# Patient Record
Sex: FEMALE | Race: WHITE | NOT HISPANIC OR LATINO | Employment: OTHER | URBAN - METROPOLITAN AREA
[De-identification: names, ages, dates, MRNs, and addresses within clinical notes are randomized per-mention and may not be internally consistent; named-entity substitution may affect disease eponyms.]

---

## 2017-03-22 DIAGNOSIS — E89.0 POSTABLATIVE HYPOTHYROIDISM: ICD-10-CM

## 2017-03-22 RX ORDER — LEVOTHYROXINE SODIUM 75 UG/1
TABLET ORAL
Qty: 30 TABLET | Refills: 1 | Status: SHIPPED | OUTPATIENT
Start: 2017-03-22 | End: 2017-05-26 | Stop reason: SDUPTHER

## 2017-05-26 ENCOUNTER — PATIENT MESSAGE (OUTPATIENT)
Dept: FAMILY MEDICINE | Facility: CLINIC | Age: 57
End: 2017-05-26

## 2017-05-26 DIAGNOSIS — Z13.220 SCREENING CHOLESTEROL LEVEL: ICD-10-CM

## 2017-05-26 DIAGNOSIS — E61.1 IRON DEFICIENCY: Primary | ICD-10-CM

## 2017-05-26 DIAGNOSIS — Z13.1 DIABETES MELLITUS SCREENING: ICD-10-CM

## 2017-05-26 DIAGNOSIS — E89.0 POSTABLATIVE HYPOTHYROIDISM: ICD-10-CM

## 2017-05-26 RX ORDER — LEVOTHYROXINE SODIUM 75 UG/1
TABLET ORAL
Qty: 30 TABLET | Refills: 0 | Status: SHIPPED | OUTPATIENT
Start: 2017-05-26 | End: 2017-06-22 | Stop reason: SDUPTHER

## 2017-05-31 LAB
ALBUMIN SERPL-MCNC: 4.1 G/DL (ref 3.6–5.1)
ALBUMIN/GLOB SERPL: 1.5 (CALC) (ref 1–2.5)
ALP SERPL-CCNC: 98 U/L (ref 33–130)
ALT SERPL-CCNC: 11 U/L (ref 6–29)
AST SERPL-CCNC: 16 U/L (ref 10–35)
BASOPHILS # BLD AUTO: 30 CELLS/UL (ref 0–200)
BASOPHILS NFR BLD AUTO: 0.6 %
BILIRUB SERPL-MCNC: 0.4 MG/DL (ref 0.2–1.2)
BUN SERPL-MCNC: 12 MG/DL (ref 7–25)
BUN/CREAT SERPL: NORMAL (CALC) (ref 6–22)
CALCIUM SERPL-MCNC: 9.3 MG/DL (ref 8.6–10.4)
CHLORIDE SERPL-SCNC: 104 MMOL/L (ref 98–110)
CHOLEST SERPL-MCNC: 203 MG/DL (ref 125–200)
CHOLEST/HDLC SERPL: 3 (CALC)
CO2 SERPL-SCNC: 26 MMOL/L (ref 20–31)
CREAT SERPL-MCNC: 1.04 MG/DL (ref 0.5–1.05)
EOSINOPHIL # BLD AUTO: 125 CELLS/UL (ref 15–500)
EOSINOPHIL NFR BLD AUTO: 2.5 %
ERYTHROCYTE [DISTWIDTH] IN BLOOD BY AUTOMATED COUNT: 13.7 % (ref 11–15)
FERRITIN SERPL-MCNC: 9 NG/ML (ref 10–232)
GFR SERPL CREATININE-BSD FRML MDRD: 60 ML/MIN/1.73M2
GLOBULIN SER CALC-MCNC: 2.8 G/DL (CALC) (ref 1.9–3.7)
GLUCOSE SERPL-MCNC: 88 MG/DL (ref 65–99)
HCT VFR BLD AUTO: 38.6 % (ref 35–45)
HDLC SERPL-MCNC: 68 MG/DL
HGB BLD-MCNC: 13.4 G/DL (ref 11.7–15.5)
LDLC SERPL CALC-MCNC: 110 MG/DL (CALC)
LYMPHOCYTES # BLD AUTO: 1610 CELLS/UL (ref 850–3900)
LYMPHOCYTES NFR BLD AUTO: 32.2 %
MCH RBC QN AUTO: 29.9 PG (ref 27–33)
MCHC RBC AUTO-ENTMCNC: 34.8 G/DL (ref 32–36)
MCV RBC AUTO: 85.9 FL (ref 80–100)
MONOCYTES # BLD AUTO: 790 CELLS/UL (ref 200–950)
MONOCYTES NFR BLD AUTO: 15.8 %
NEUTROPHILS # BLD AUTO: 2445 CELLS/UL (ref 1500–7800)
NEUTROPHILS NFR BLD AUTO: 48.9 %
NONHDLC SERPL-MCNC: 135 MG/DL (CALC)
PLATELET # BLD AUTO: 223 THOUSAND/UL (ref 140–400)
PMV BLD REES-ECKER: 7.5 FL (ref 7.5–12.5)
POTASSIUM SERPL-SCNC: 4.8 MMOL/L (ref 3.5–5.3)
PROT SERPL-MCNC: 6.9 G/DL (ref 6.1–8.1)
RBC # BLD AUTO: 4.49 MILLION/UL (ref 3.8–5.1)
SODIUM SERPL-SCNC: 138 MMOL/L (ref 135–146)
T4 FREE SERPL-MCNC: 1.3 NG/DL (ref 0.8–1.8)
TRIGL SERPL-MCNC: 123 MG/DL
TSH SERPL-ACNC: 3.08 MIU/L
WBC # BLD AUTO: 5 THOUSAND/UL (ref 3.8–10.8)

## 2017-06-02 ENCOUNTER — OFFICE VISIT (OUTPATIENT)
Dept: FAMILY MEDICINE | Facility: CLINIC | Age: 57
End: 2017-06-02
Payer: COMMERCIAL

## 2017-06-02 VITALS
TEMPERATURE: 98 F | DIASTOLIC BLOOD PRESSURE: 80 MMHG | BODY MASS INDEX: 22.08 KG/M2 | SYSTOLIC BLOOD PRESSURE: 108 MMHG | OXYGEN SATURATION: 98 % | HEART RATE: 77 BPM | WEIGHT: 120 LBS | HEIGHT: 62 IN

## 2017-06-02 DIAGNOSIS — Z23 NEED FOR TDAP VACCINATION: ICD-10-CM

## 2017-06-02 DIAGNOSIS — E89.0 POSTABLATIVE HYPOTHYROIDISM: ICD-10-CM

## 2017-06-02 DIAGNOSIS — E61.1 IRON DEFICIENCY: ICD-10-CM

## 2017-06-02 DIAGNOSIS — Z00.00 ANNUAL PHYSICAL EXAM: Primary | ICD-10-CM

## 2017-06-02 PROCEDURE — 90715 TDAP VACCINE 7 YRS/> IM: CPT | Mod: S$GLB,,, | Performed by: NURSE PRACTITIONER

## 2017-06-02 PROCEDURE — 99999 PR PBB SHADOW E&M-EST. PATIENT-LVL IV: CPT | Mod: PBBFAC,,, | Performed by: NURSE PRACTITIONER

## 2017-06-02 PROCEDURE — 90471 IMMUNIZATION ADMIN: CPT | Mod: S$GLB,,, | Performed by: NURSE PRACTITIONER

## 2017-06-02 PROCEDURE — 99396 PREV VISIT EST AGE 40-64: CPT | Mod: S$GLB,,, | Performed by: NURSE PRACTITIONER

## 2017-06-02 NOTE — PROGRESS NOTES
Subjective:       Patient ID: Sarahy Ma is a 57 y.o. female.    Chief Complaint: Annual Exam (wellness)    Patient is a 57 year old white female here today for annual physical exam with fasting wellness results.    Patient has a history of Tongue Cancer that is followed by Oncologist at [facility].    Patient has Postablative Hypothyroidism due to the radiation treatment for tongue cancer.  Thyroid levels are controlled on Synthroid at present dose.  See results below.    Patient has Iron Deficiency.  She states due to stomach issues, she can not tolerate the ferrous sulfate.  She reports she spoke with her oncologist about the iron deficiency and he referred her to Dr. Tobias.  Dr. Tobias plans to do an upper and lower GI scope - patient needs to schedule.  Ferritin count remains low at 9.    Wellness labs:  -  CBC WNL - shows no anemia but ferritin level is 9.  -  CMP WNL  -  Cholesterol - total slightly elevated at 203 but HDL high at 68 so ratio okay and .  Triglycerides are normal.  -  Thyroid levels controlled on Synthroid at present dose.    Health Maintenance:  -  Reports had PAP this year with Dr. Washington - will request record.  -  Due for Tdap vaccine.          Component      Latest Ref Rng & Units 5/30/2017 11/14/2016 8/23/2016   WBC      3.8 - 10.8 Thousand/uL 5.0 5.67    RBC      3.80 - 5.10 Million/uL 4.49 4.37    Hemoglobin      11.7 - 15.5 g/dL 13.4 12.3    Hematocrit      35.0 - 45.0 % 38.6 37.6    MCV      80.0 - 100.0 fL 85.9 86    MCH      27.0 - 33.0 pg 29.9 28.1    MCHC      32.0 - 36.0 g/dL 34.8 32.7    RDW      11.0 - 15.0 % 13.7 13.2    Platelets      140 - 400 Thousand/uL 223 192    MPV      7.5 - 12.5 fL 7.5 8.9 (L)    Gran #      1.8 - 7.7 K/uL  3.3    Lymph #      850 - 3,900 cells/uL 1,610 1.3    Mono #      200 - 950 cells/uL 790 0.8    Eos #      15 - 500 cells/uL 125 0.3    Baso #      0 - 200 cells/uL 30 0.03    Gran%      38.0 - 73.0 %  57.7    Lymph%      % 32.2 22.6     Mono%      % 15.8 14.1    Eosinophil%      % 2.5 4.9    Basophil%      % 0.6 0.5    Differential Method        Automated    Neutrophils Absolute      1,500 - 7,800 cells/uL 2,445     Neutrophils Relative      % 48.9     Glucose      65 - 99 mg/dL 88 84    BUN, Bld      7 - 25 mg/dL 12 12    Creatinine      0.50 - 1.05 mg/dL 1.04 0.88    eGFR if non       > OR = 60 mL/min/1.73m2 60 >60.0    eGFR if       > OR = 60 mL/min/1.73m2 70 >60.0    BUN/Creatinine Ratio      6 - 22 (calc) NOT APPLICABLE     Sodium      135 - 146 mmol/L 138 144    Potassium      3.5 - 5.3 mmol/L 4.8 4.1    Chloride      98 - 110 mmol/L 104 103    CO2      20 - 31 mmol/L 26 28    Calcium      8.6 - 10.4 mg/dL 9.3 9.3    Total Protein      6.1 - 8.1 g/dL 6.9 7.1    Albumin      3.6 - 5.1 g/dL 4.1 3.9    Globulin, Total      1.9 - 3.7 g/dL (calc) 2.8     Albumin/Globulin Ratio      1.0 - 2.5 (calc) 1.5     Total Bilirubin      0.2 - 1.2 mg/dL 0.4 0.4    Alkaline Phosphatase      33 - 130 U/L 98 90    AST      10 - 35 U/L 16 23    ALT      6 - 29 U/L 11 23    Anion Gap      8 - 16 mmol/L  13    Cholesterol      125 - 200 mg/dL 203 (H)     HDL      > OR = 46 mg/dL 68     Triglycerides      <150 mg/dL 123     LDL Cholesterol      <130 mg/dL (calc) 110     HDL/Chol Ratio      < OR = 5.0 (calc) 3.0     Non HDL Chol. (LDL+VLDL)      mg/dL (calc) 135     Iron      30 - 160 ug/dL  39    Transferrin      200 - 375 mg/dL  305    TIBC      250 - 450 ug/dL  451 (H)    Saturated Iron      20 - 50 %  9 (L)    TSH      mIU/L 3.08 2.593 3.253   Free T4      0.71 - 1.51 ng/dL  1.22 1.16   Ferritin      10 - 232 ng/mL 9 (L) 8 (L)    T4, Free      0.8 - 1.8 ng/dL 1.3         Previous Medications    SYNTHROID 75 MCG TABLET    TAKE 1 TABLET BY MOUTH ONCE DAILY.       Past Medical History:   Diagnosis Date    Postablative hypothyroidism     secondary to the radiation treatment for tongue cancer - treated by oncologist    Tongue  cancer        Past Surgical History:   Procedure Laterality Date     SECTION      CHOLECYSTECTOMY  2016    removed    CHOLECYSTECTOMY  2016    feeding tube      PORTACATH PLACEMENT      TONSILLECTOMY      TUBAL LIGATION         Family History   Problem Relation Age of Onset    Hypertension Father     Diabetes Father     Hypertension Mother     Thyroid disease Sister     Thyroid disease Brother     Thyroid disease Sister     Thyroid disease Brother        Social History     Social History    Marital status:      Spouse name: N/A    Number of children: N/A    Years of education: N/A     Social History Main Topics    Smoking status: Former Smoker    Smokeless tobacco: None    Alcohol use Yes      Comment: occasionally    Drug use: No    Sexual activity: Not Asked     Other Topics Concern    None     Social History Narrative    None       Review of Systems   Constitutional: Positive for fatigue. Negative for activity change, appetite change, chills, fever and unexpected weight change.   HENT: Negative for congestion, ear pain, hearing loss, mouth sores, nosebleeds, postnasal drip, rhinorrhea, sinus pressure, sneezing, sore throat, trouble swallowing and voice change.    Eyes: Negative for photophobia, pain, discharge, redness, itching and visual disturbance.   Respiratory: Negative for cough, chest tightness, shortness of breath and wheezing.    Cardiovascular: Negative for chest pain, palpitations and leg swelling.   Gastrointestinal: Positive for constipation. Negative for abdominal pain, blood in stool, diarrhea, nausea and vomiting.   Endocrine: Negative for polydipsia and polyuria.   Genitourinary: Negative for difficulty urinating, dysuria, frequency, hematuria, menstrual problem and urgency.   Musculoskeletal: Positive for arthralgias. Negative for back pain, joint swelling, myalgias and neck pain.   Skin: Negative for color change and rash.   Allergic/Immunologic:  "Negative for immunocompromised state.   Neurological: Positive for headaches. Negative for dizziness, seizures, syncope and weakness.   Hematological: Negative for adenopathy. Does not bruise/bleed easily.   Psychiatric/Behavioral: Negative for agitation, confusion, dysphoric mood, sleep disturbance and suicidal ideas. The patient is not nervous/anxious.          Objective:     Vitals:    06/02/17 0831   BP: 108/80   BP Location: Left arm   Patient Position: Sitting   BP Method: Manual   Pulse: 77   Temp: 97.8 °F (36.6 °C)   TempSrc: Oral   SpO2: 98%   Weight: 54.4 kg (120 lb)   Height: 5' 1.5" (1.562 m)          Physical Exam   Constitutional: She is oriented to person, place, and time. She appears well-developed and well-nourished. No distress.   Body mass index is 22.31 kg/m².     HENT:   Head: Normocephalic and atraumatic.   Right Ear: External ear normal.   Left Ear: External ear normal.   Nose: Nose normal.   Mouth/Throat: Oropharynx is clear and moist. No oropharyngeal exudate.   Eyes: EOM are normal. Pupils are equal, round, and reactive to light.   Neck: Normal range of motion. Neck supple. No JVD present. No tracheal deviation present. No thyromegaly present.   Cardiovascular: Normal rate, regular rhythm and normal heart sounds.    No murmur heard.  Pulmonary/Chest: Effort normal and breath sounds normal. No stridor. No respiratory distress.   Abdominal: Soft. She exhibits no distension. There is no tenderness. There is no guarding.   Musculoskeletal: Normal range of motion. She exhibits no edema.   Lymphadenopathy:     She has no cervical adenopathy.   Neurological: She is alert and oriented to person, place, and time. No cranial nerve deficit. Coordination normal.   Skin: Skin is warm and dry. No rash noted. She is not diaphoretic.   Psychiatric: She has a normal mood and affect.         Assessment:         ICD-10-CM ICD-9-CM   1. Annual physical exam Z00.00 V70.0   2. Postablative hypothyroidism E89.0 " 244.1   3. Iron deficiency E61.1 280.9   4. Need for Tdap vaccination Z23 V06.1       Plan:       Annual physical exam  Health Maintenance Summary     Influenza Vaccine Next Due 8/1/2017    Mammogram Next Due 2/14/2019     Done 2/14/2017 [facility]    Done 3/14/2016     Done 7/15/2014    Pap Smear with HPV Cotest Next Due 1/6/2020     Done 1/6/2017 Dr. Washington   Lipid Panel Next Due 5/30/2022     Done 5/30/2017 LIPID PANEL (A)    Done 7/24/2015 LIPID PANEL   Colonoscopy Next Due 7/14/2024     Done 7/14/2014 normal - repeat in 10 years - Dr. Greene   TETANUS VACCINE Next Due 6/2/2027     Done 6/2/2017 Imm Admin: Tdap   Hepatitis C Screening Completed     Done 8/23/2016 HEPATITIS C ANTIBODY         Postablative hypothyroidism  -  Controlled on Synthroid at present dose.    Iron deficiency  -  Patient is followed by GI, Dr. Tobias for further evaluation.    Need for Tdap vaccination  -     Tdap Vaccine      Return in about 6 months (around 12/2/2017) for thyroid check.     Patient's Medications   New Prescriptions    No medications on file   Previous Medications    SYNTHROID 75 MCG TABLET    TAKE 1 TABLET BY MOUTH ONCE DAILY.   Modified Medications    No medications on file   Discontinued Medications    MECLIZINE (ANTIVERT) 25 MG TABLET    Take 1 tablet (25 mg total) by mouth every 6 (six) hours as needed for Dizziness.    MOMETASONE (NASONEX) 50 MCG/ACTUATION NASAL SPRAY    2 sprays once daily.

## 2017-06-22 ENCOUNTER — PATIENT MESSAGE (OUTPATIENT)
Dept: FAMILY MEDICINE | Facility: CLINIC | Age: 57
End: 2017-06-22

## 2017-06-22 DIAGNOSIS — E89.0 POSTABLATIVE HYPOTHYROIDISM: ICD-10-CM

## 2017-06-22 RX ORDER — LEVOTHYROXINE SODIUM 75 UG/1
TABLET ORAL
Qty: 30 TABLET | Refills: 5 | Status: SHIPPED | OUTPATIENT
Start: 2017-06-22 | End: 2017-07-10 | Stop reason: DRUGHIGH

## 2017-07-06 ENCOUNTER — PATIENT MESSAGE (OUTPATIENT)
Dept: FAMILY MEDICINE | Facility: CLINIC | Age: 57
End: 2017-07-06

## 2017-07-06 DIAGNOSIS — E89.0 POSTABLATIVE HYPOTHYROIDISM: ICD-10-CM

## 2017-07-10 RX ORDER — LEVOTHYROXINE SODIUM 88 UG/1
88 TABLET ORAL DAILY
Qty: 30 TABLET | Refills: 1 | Status: SHIPPED | OUTPATIENT
Start: 2017-07-10 | End: 2017-08-16 | Stop reason: DRUGHIGH

## 2017-08-16 ENCOUNTER — OFFICE VISIT (OUTPATIENT)
Dept: FAMILY MEDICINE | Facility: CLINIC | Age: 57
End: 2017-08-16
Payer: COMMERCIAL

## 2017-08-16 VITALS
BODY MASS INDEX: 21.79 KG/M2 | HEART RATE: 81 BPM | OXYGEN SATURATION: 98 % | HEIGHT: 62 IN | DIASTOLIC BLOOD PRESSURE: 96 MMHG | TEMPERATURE: 98 F | SYSTOLIC BLOOD PRESSURE: 144 MMHG | WEIGHT: 118.38 LBS

## 2017-08-16 DIAGNOSIS — E89.0 POSTABLATIVE HYPOTHYROIDISM: Primary | ICD-10-CM

## 2017-08-16 DIAGNOSIS — R03.0 TEMPORARY HIGH BLOOD PRESSURE: ICD-10-CM

## 2017-08-16 PROCEDURE — 99999 PR PBB SHADOW E&M-EST. PATIENT-LVL IV: CPT | Mod: PBBFAC,,, | Performed by: NURSE PRACTITIONER

## 2017-08-16 PROCEDURE — 99213 OFFICE O/P EST LOW 20 MIN: CPT | Mod: S$GLB,,, | Performed by: NURSE PRACTITIONER

## 2017-08-16 PROCEDURE — 3008F BODY MASS INDEX DOCD: CPT | Mod: S$GLB,,, | Performed by: NURSE PRACTITIONER

## 2017-08-16 RX ORDER — MESALAMINE 1.2 G/1
2.4 TABLET, DELAYED RELEASE ORAL EVERY MORNING
Refills: 4 | COMMUNITY
Start: 2017-08-04 | End: 2019-12-09

## 2017-08-16 RX ORDER — BALSALAZIDE DISODIUM 750 MG/1
CAPSULE ORAL
Refills: 5 | COMMUNITY
Start: 2017-08-09 | End: 2018-08-08

## 2017-08-16 RX ORDER — METRONIDAZOLE 500 MG/1
TABLET ORAL
Refills: 0 | COMMUNITY
Start: 2017-08-04 | End: 2017-11-24

## 2017-08-16 RX ORDER — LEVOTHYROXINE SODIUM 75 UG/1
75 TABLET ORAL DAILY
Qty: 30 TABLET | Refills: 1
Start: 2017-08-16 | End: 2018-02-15 | Stop reason: SDUPTHER

## 2017-08-16 RX ORDER — MESALAMINE 1000 MG/1
SUPPOSITORY RECTAL
Refills: 0 | COMMUNITY
Start: 2017-08-09 | End: 2017-11-24

## 2017-08-16 NOTE — PROGRESS NOTES
Subjective:       Patient ID: Sarahy Ma is a 57 y.o. female.    Chief Complaint: Blood Pressure Check (started Saturday Blood pressure going up and down)    Patient is here today for blood pressure check.    Patient reports that for the past 4 days, she has been feeling bad and her blood pressure has been elevated at home.  Reports blood pressure mostly in the 130 - 140s/90s.  Patient is normally in the 110-120/70s previously.  Patient has been started on several GI medications for constipation in the past month but this is not a listed side effect.  Patient did convince me to increase her Synthroid from 75 mcg to 88 mcg in 2017 - so now on the increased dose of Synthroid and now having blood pressure fluctuations.  According to chart - last time blood pressure was high in  and having palpitations, it was also related to thyroid level.  Advised patient that we will decrease dose to 75 mcg daily - skip 2 days and start at lower dose.  Monitor blood pressure at home and follow up in 2 weeks.        Previous Medications    BALSALAZIDE (COLAZAL) 750 MG CAPSULE    TAKE 3 CAPSULE(S) 3 TIMES A DAY BY ORAL ROUTE FOR 30 DAYS.    CANASA 1,000 MG SUPP    UNWRAP & INSERT 1 SUPPOSITORY EVERY DAY BY RECTAL ROUTE AT BEDTIME.    LIALDA 1.2 GRAM TBEC    Take 2.4 g by mouth every morning.    METRONIDAZOLE (FLAGYL) 500 MG TABLET    Take 1 tablet twice daily    SYNTHROID 88 MCG TABLET    Take 1 tablet (88 mcg total) by mouth once daily. BRAND ONLY       Past Medical History:   Diagnosis Date    Postablative hypothyroidism     secondary to the radiation treatment for tongue cancer - treated by oncologist    Tongue cancer        Past Surgical History:   Procedure Laterality Date     SECTION      CHOLECYSTECTOMY  2016    removed    CHOLECYSTECTOMY  2016    feeding tube      PORTACATH PLACEMENT      TONSILLECTOMY      TUBAL LIGATION         Family History   Problem Relation Age of Onset    Hypertension  Father     Diabetes Father     Hypertension Mother     Thyroid disease Sister     Thyroid disease Brother     Thyroid disease Sister     Thyroid disease Brother        Social History     Social History    Marital status:      Spouse name: N/A    Number of children: N/A    Years of education: N/A     Social History Main Topics    Smoking status: Former Smoker    Smokeless tobacco: Never Used    Alcohol use Yes      Comment: occasionally    Drug use: No    Sexual activity: Not Asked     Other Topics Concern    None     Social History Narrative    None       Review of Systems   Constitutional: Positive for fatigue. Negative for appetite change, chills, fever and unexpected weight change.   HENT: Negative for congestion, ear pain, mouth sores, nosebleeds, postnasal drip, rhinorrhea, sinus pressure, sneezing, sore throat, trouble swallowing and voice change.    Eyes: Negative for photophobia, pain, discharge, redness, itching and visual disturbance.   Respiratory: Negative for cough, chest tightness and shortness of breath.    Cardiovascular: Negative for chest pain, palpitations and leg swelling.   Gastrointestinal: Negative for abdominal pain, blood in stool, constipation, diarrhea, nausea and vomiting.   Genitourinary: Negative for dysuria, frequency, hematuria and urgency.   Musculoskeletal: Negative for arthralgias, back pain, joint swelling and myalgias.   Skin: Negative for color change and rash.   Allergic/Immunologic: Negative for immunocompromised state.   Neurological: Positive for dizziness, weakness and light-headedness. Negative for seizures, syncope and headaches.   Hematological: Negative for adenopathy. Does not bruise/bleed easily.   Psychiatric/Behavioral: Negative for agitation, dysphoric mood, sleep disturbance and suicidal ideas. The patient is not nervous/anxious.          Objective:     Vitals:    08/16/17 1402 08/16/17 1419 08/16/17 1420   BP: (!) 170/98 (!) 142/98 (!)  "144/96   BP Location: Left arm Left arm Right arm   Patient Position: Sitting     BP Method: Medium (Manual)     Pulse: 81     Temp: 97.9 °F (36.6 °C)     TempSrc: Oral     SpO2: 98%     Weight: 53.7 kg (118 lb 6.2 oz)     Height: 5' 1.5" (1.562 m)            Physical Exam   Constitutional: She is oriented to person, place, and time. She appears well-developed and well-nourished.   HENT:   Head: Normocephalic and atraumatic.   Right Ear: External ear normal.   Left Ear: External ear normal.   Nose: Nose normal.   Mouth/Throat: Oropharynx is clear and moist. No oropharyngeal exudate.   Eyes: EOM are normal. Pupils are equal, round, and reactive to light.   Neck: Normal range of motion. Neck supple. No tracheal deviation present. No thyromegaly present.   Cardiovascular: Normal rate, regular rhythm and normal heart sounds.    No murmur heard.  Pulmonary/Chest: Effort normal and breath sounds normal. No respiratory distress.   Abdominal: Soft. She exhibits no distension.   Musculoskeletal: Normal range of motion. She exhibits no edema.   Lymphadenopathy:     She has no cervical adenopathy.   Neurological: She is alert and oriented to person, place, and time. No cranial nerve deficit. Coordination normal.   Skin: Skin is warm and dry. No rash noted.   Psychiatric: She has a normal mood and affect.         Assessment:         ICD-10-CM ICD-9-CM   1. Postablative hypothyroidism E89.0 244.1   2. Temporary high blood pressure R03.0 796.2       Plan:       Postablative hypothyroidism  -  Skip 2 days thyroid medication and start back on lower dose Synthroid 75 mcg daily - will repeat levels in 8 to 12 weeks.  -     SYNTHROID 75 mcg tablet; Take 1 tablet (75 mcg total) by mouth once daily.  Dispense: 30 tablet; Refill: 1    Temporary high blood pressure  -  Monitor blood pressure at home and keep log - will follow up in 2 weeks.      Return in about 2 weeks (around 8/30/2017).     Patient's Medications   New Prescriptions    " SYNTHROID 75 MCG TABLET    Take 1 tablet (75 mcg total) by mouth once daily.   Previous Medications    BALSALAZIDE (COLAZAL) 750 MG CAPSULE    TAKE 3 CAPSULE(S) 3 TIMES A DAY BY ORAL ROUTE FOR 30 DAYS.    CANASA 1,000 MG SUPP    UNWRAP & INSERT 1 SUPPOSITORY EVERY DAY BY RECTAL ROUTE AT BEDTIME.    LIALDA 1.2 GRAM TBEC    Take 2.4 g by mouth every morning.    METRONIDAZOLE (FLAGYL) 500 MG TABLET    Take 1 tablet twice daily   Modified Medications    No medications on file   Discontinued Medications    SYNTHROID 88 MCG TABLET    Take 1 tablet (88 mcg total) by mouth once daily. BRAND ONLY

## 2017-08-18 ENCOUNTER — TELEPHONE (OUTPATIENT)
Dept: FAMILY MEDICINE | Facility: CLINIC | Age: 57
End: 2017-08-18

## 2017-08-18 RX ORDER — METOPROLOL TARTRATE 25 MG/1
25 TABLET, FILM COATED ORAL 2 TIMES DAILY
Qty: 60 TABLET | Refills: 0 | Status: SHIPPED | OUTPATIENT
Start: 2017-08-18 | End: 2017-09-22 | Stop reason: SDUPTHER

## 2017-08-18 NOTE — TELEPHONE ENCOUNTER
Patient reports blood pressure continues to stay elevated 140s/90s and still feels some dizziness/fogginess.  Will treat the blood pressure with Metoprolol 25 mg twice daily starting today on Friday and see how she feels over the next 3 to 4 days - if no improvement in symptoms by Wednesday - come into office.  If symptoms improving and resolving on medications - continue medication and we will monitor and follow up in 2 weeks.  Patient verbalizes understanding.

## 2017-08-18 NOTE — TELEPHONE ENCOUNTER
----- Message from Sara Pinedo sent at 8/18/2017 11:12 AM CDT -----  No. [phone]    Patient would like to speak to Susan Hebert about how she is progressing.

## 2017-09-01 DIAGNOSIS — E89.0 POSTABLATIVE HYPOTHYROIDISM: ICD-10-CM

## 2017-09-01 RX ORDER — LEVOTHYROXINE SODIUM 88 UG/1
TABLET ORAL
Qty: 30 TABLET | Refills: 1 | OUTPATIENT
Start: 2017-09-01

## 2017-09-22 RX ORDER — METOPROLOL TARTRATE 25 MG/1
25 TABLET, FILM COATED ORAL 2 TIMES DAILY
Qty: 60 TABLET | Refills: 0 | Status: SHIPPED | OUTPATIENT
Start: 2017-09-22 | End: 2018-08-08

## 2017-09-22 NOTE — TELEPHONE ENCOUNTER
Advise patient we need to come in for office visit to check blood pressure on medication - make appt for around first week October.

## 2017-09-25 ENCOUNTER — PATIENT MESSAGE (OUTPATIENT)
Dept: FAMILY MEDICINE | Facility: CLINIC | Age: 57
End: 2017-09-25

## 2017-11-24 ENCOUNTER — OFFICE VISIT (OUTPATIENT)
Dept: FAMILY MEDICINE | Facility: CLINIC | Age: 57
End: 2017-11-24
Payer: COMMERCIAL

## 2017-11-24 ENCOUNTER — TELEPHONE (OUTPATIENT)
Dept: FAMILY MEDICINE | Facility: CLINIC | Age: 57
End: 2017-11-24

## 2017-11-24 VITALS
BODY MASS INDEX: 22.07 KG/M2 | HEIGHT: 62 IN | DIASTOLIC BLOOD PRESSURE: 68 MMHG | WEIGHT: 119.94 LBS | HEART RATE: 71 BPM | SYSTOLIC BLOOD PRESSURE: 102 MMHG | TEMPERATURE: 98 F | OXYGEN SATURATION: 98 %

## 2017-11-24 DIAGNOSIS — D50.9 IRON DEFICIENCY ANEMIA, UNSPECIFIED IRON DEFICIENCY ANEMIA TYPE: ICD-10-CM

## 2017-11-24 DIAGNOSIS — R03.0 TEMPORARY HIGH BLOOD PRESSURE: Primary | ICD-10-CM

## 2017-11-24 PROBLEM — Z85.810 HISTORY OF TONGUE CANCER: Status: ACTIVE | Noted: 2017-11-24

## 2017-11-24 PROCEDURE — 99999 PR PBB SHADOW E&M-EST. PATIENT-LVL IV: CPT | Mod: PBBFAC,,, | Performed by: NURSE PRACTITIONER

## 2017-11-24 PROCEDURE — 99213 OFFICE O/P EST LOW 20 MIN: CPT | Mod: S$GLB,,, | Performed by: NURSE PRACTITIONER

## 2017-11-24 RX ORDER — MOMETASONE FUROATE 50 UG/1
SPRAY, METERED NASAL
COMMUNITY
Start: 2017-10-21 | End: 2018-08-08

## 2017-11-24 NOTE — PROGRESS NOTES
Subjective:       Patient ID: Sarahy Ma is a 57 y.o. female.    Chief Complaint: Medication Refill    Patient is here today for blood pressure check.    Patient came to see me in August with report her blood pressure had been running high at home and not feeling well.  I thought it was secondary to raising her Synthroid level so I cut back on the Synthroid and we are now due for thyroid level check.  Patient had called just a few days later and was not feeling well and blood pressure high at home so I put patient on Metoprolol 25 mg twice daily.  Patient reports that her blood pressure improved but she states that she was found to have GI Ulcerative Colitis with Anemia and she was getting iron infusions and changing around GI medications and one of the medications was stopped, and she immediately felt better and her blood pressure normalized.  She reports she had to cut back to taking the Metoprolol 25 mg to only once daily because her blood pressure has been dropping too low.  Patient reports intermittent blurry vision, fatigue, and anxiousness - advised that this could be symptoms if her blood pressure is continuing to drop too low during the day - will STOP blood pressure medication and monitor blood pressure at home and monitor progression of symptoms.  Will also get labs to check thyroid level and ferritin levels since iron infusions.      Hypertension   This is a new problem. The current episode started more than 1 month ago. The problem has been resolved since onset. The problem is controlled. Associated symptoms include anxiety, blurred vision and malaise/fatigue. Pertinent negatives include no chest pain, headaches, neck pain, orthopnea, palpitations, peripheral edema, PND, shortness of breath or sweats. Agents associated with hypertension include decongestants, NSAIDs and thyroid hormones. There are no known risk factors for coronary artery disease. Past treatments include beta blockers. The current  treatment provides significant improvement. There are no compliance problems.        Previous Medications    BALSALAZIDE (COLAZAL) 750 MG CAPSULE    TAKE 2 CAPSULE(S) ONCE A DAY BY ORAL ROUTE FOR 30 DAYS.    LIALDA 1.2 GRAM TBEC    Take 2.4 g by mouth every morning.    METOPROLOL TARTRATE (LOPRESSOR) 25 MG TABLET    Take 1 tablet (25 mg total) by mouth 2 (two) times daily.    MOMETASONE (NASONEX) 50 MCG/ACTUATION NASAL SPRAY        SYNTHROID 75 MCG TABLET    Take 1 tablet (75 mcg total) by mouth once daily.    VITAMIN B COMPLEX (B COMPLEX-VITAMIN B12 ORAL)    Take by mouth.       Past Medical History:   Diagnosis Date    Postablative hypothyroidism     secondary to the radiation treatment for tongue cancer - treated by oncologist    Tongue cancer        Past Surgical History:   Procedure Laterality Date     SECTION      CHOLECYSTECTOMY  2016    removed    CHOLECYSTECTOMY  2016    feeding tube      groin surgery  2017    swollen lump node    PORTACATH PLACEMENT      TONSILLECTOMY      TUBAL LIGATION         Family History   Problem Relation Age of Onset    Hypertension Father     Diabetes Father     Hypertension Mother     Thyroid disease Sister     Thyroid disease Brother     Thyroid disease Sister     Thyroid disease Brother        Social History     Social History    Marital status:      Spouse name: N/A    Number of children: N/A    Years of education: N/A     Social History Main Topics    Smoking status: Former Smoker    Smokeless tobacco: Never Used    Alcohol use Yes      Comment: occasionally    Drug use: No    Sexual activity: Not Asked     Other Topics Concern    None     Social History Narrative    None       Review of Systems   Constitutional: Positive for fatigue and malaise/fatigue. Negative for appetite change, chills, fever and unexpected weight change.   HENT: Negative for congestion, ear pain, mouth sores, nosebleeds, postnasal drip,  "rhinorrhea, sinus pressure, sneezing, sore throat, trouble swallowing and voice change.    Eyes: Positive for blurred vision and visual disturbance. Negative for photophobia, pain, discharge, redness and itching.   Respiratory: Negative for cough, chest tightness and shortness of breath.    Cardiovascular: Negative for chest pain, palpitations, orthopnea, leg swelling and PND.   Gastrointestinal: Negative for abdominal pain, blood in stool, constipation, diarrhea, nausea and vomiting.   Genitourinary: Negative for dysuria, frequency, hematuria and urgency.   Musculoskeletal: Negative for arthralgias, back pain, joint swelling, myalgias and neck pain.   Skin: Negative for color change and rash.   Allergic/Immunologic: Negative for immunocompromised state.   Neurological: Negative for dizziness, seizures, syncope, weakness and headaches.   Hematological: Negative for adenopathy. Does not bruise/bleed easily.   Psychiatric/Behavioral: Negative for agitation, dysphoric mood, sleep disturbance and suicidal ideas. The patient is nervous/anxious.          Objective:     Vitals:    11/24/17 1024   BP: 100/70   BP Location: Right arm   Patient Position: Sitting   BP Method: Medium (Manual)   Pulse: 71   Temp: 97.6 °F (36.4 °C)   TempSrc: Oral   SpO2: 98%   Weight: 54.4 kg (119 lb 14.9 oz)   Height: 5' 1.5" (1.562 m)          Physical Exam   Constitutional: She is oriented to person, place, and time. She appears well-developed and well-nourished.   HENT:   Head: Normocephalic and atraumatic.   Right Ear: External ear normal.   Left Ear: External ear normal.   Nose: Nose normal.   Mouth/Throat: Oropharynx is clear and moist. No oropharyngeal exudate.   Eyes: EOM are normal. Pupils are equal, round, and reactive to light.   Neck: Normal range of motion. Neck supple. No tracheal deviation present. No thyromegaly present.   Cardiovascular: Normal rate, regular rhythm and normal heart sounds.    No murmur heard.  Pulmonary/Chest: " Effort normal and breath sounds normal. No respiratory distress.   Abdominal: Soft. She exhibits no distension.   Musculoskeletal: Normal range of motion. She exhibits no edema.   Lymphadenopathy:     She has no cervical adenopathy.   Neurological: She is alert and oriented to person, place, and time. No cranial nerve deficit. Coordination normal.   Skin: Skin is warm and dry. No rash noted.   Psychiatric: She has a normal mood and affect.         Assessment:         ICD-10-CM ICD-9-CM   1. Temporary high blood pressure R03.0 796.2   2. Iron deficiency anemia, unspecified iron deficiency anemia type D50.9 280.9       Plan:       Temporary high blood pressure  -  RESOLVED - STOP the Metoprolol 25 mg daily as this may be dropping blood pressure too low and monitor blood pressure at home for next few weeks and then contact me with blood pressure readings.    Iron deficiency anemia, unspecified iron deficiency anemia type  -  Had iron infusions in August 2017 at [facility] - due to recheck levels.  -     CBC auto differential; Future; Expected date: 11/24/2017  -     Ferritin; Future; Expected date: 11/24/2017      Return for pending lab results.     Patient's Medications   New Prescriptions    No medications on file   Previous Medications    BALSALAZIDE (COLAZAL) 750 MG CAPSULE    TAKE 2 CAPSULE(S) ONCE A DAY BY ORAL ROUTE FOR 30 DAYS.    LIALDA 1.2 GRAM TBEC    Take 2.4 g by mouth every morning.    METOPROLOL TARTRATE (LOPRESSOR) 25 MG TABLET    Take 1 tablet (25 mg total) by mouth 2 (two) times daily.    MOMETASONE (NASONEX) 50 MCG/ACTUATION NASAL SPRAY        SYNTHROID 75 MCG TABLET    Take 1 tablet (75 mcg total) by mouth once daily.    VITAMIN B COMPLEX (B COMPLEX-VITAMIN B12 ORAL)    Take by mouth.   Modified Medications    No medications on file   Discontinued Medications    CANASA 1,000 MG SUPP    UNWRAP & INSERT 1 SUPPOSITORY EVERY DAY BY RECTAL ROUTE AT BEDTIME.    METRONIDAZOLE (FLAGYL) 500 MG TABLET    Take 1  tablet twice daily

## 2017-11-24 NOTE — TELEPHONE ENCOUNTER
----- Message from Alysa Guthrie sent at 11/24/2017 11:14 AM CST -----  Patient stated that for her blood work, she would like to have them completed at [facility] in [place].

## 2017-12-29 LAB
BASOPHILS # BLD AUTO: 21 CELLS/UL (ref 0–200)
BASOPHILS NFR BLD AUTO: 0.5 %
EOSINOPHIL # BLD AUTO: 92 CELLS/UL (ref 15–500)
EOSINOPHIL NFR BLD AUTO: 2.2 %
ERYTHROCYTE [DISTWIDTH] IN BLOOD BY AUTOMATED COUNT: 11.8 % (ref 11–15)
FERRITIN SERPL-MCNC: 141 NG/ML (ref 10–232)
HCT VFR BLD AUTO: 39.7 % (ref 35–45)
HGB BLD-MCNC: 13.5 G/DL (ref 11.7–15.5)
LYMPHOCYTES # BLD AUTO: 1331 CELLS/UL (ref 850–3900)
LYMPHOCYTES NFR BLD AUTO: 31.7 %
MCH RBC QN AUTO: 30.5 PG (ref 27–33)
MCHC RBC AUTO-ENTMCNC: 34 G/DL (ref 32–36)
MCV RBC AUTO: 89.8 FL (ref 80–100)
MONOCYTES # BLD AUTO: 554 CELLS/UL (ref 200–950)
MONOCYTES NFR BLD AUTO: 13.2 %
NEUTROPHILS # BLD AUTO: 2201 CELLS/UL (ref 1500–7800)
NEUTROPHILS NFR BLD AUTO: 52.4 %
PLATELET # BLD AUTO: 192 THOUSAND/UL (ref 140–400)
PMV BLD REES-ECKER: 9.6 FL (ref 7.5–12.5)
RBC # BLD AUTO: 4.42 MILLION/UL (ref 3.8–5.1)
T4 FREE SERPL-MCNC: 1.3 NG/DL (ref 0.8–1.8)
TSH SERPL-ACNC: 1.25 MIU/L
WBC # BLD AUTO: 4.2 THOUSAND/UL (ref 3.8–10.8)

## 2018-02-15 DIAGNOSIS — E89.0 POSTABLATIVE HYPOTHYROIDISM: ICD-10-CM

## 2018-02-15 RX ORDER — LEVOTHYROXINE SODIUM 75 UG/1
TABLET ORAL
Qty: 30 TABLET | Refills: 5 | Status: SHIPPED | OUTPATIENT
Start: 2018-02-15 | End: 2018-08-24 | Stop reason: SDUPTHER

## 2018-08-08 ENCOUNTER — OFFICE VISIT (OUTPATIENT)
Dept: FAMILY MEDICINE | Facility: CLINIC | Age: 58
End: 2018-08-08
Payer: COMMERCIAL

## 2018-08-08 VITALS
OXYGEN SATURATION: 99 % | HEIGHT: 62 IN | BODY MASS INDEX: 21.16 KG/M2 | TEMPERATURE: 98 F | HEART RATE: 83 BPM | SYSTOLIC BLOOD PRESSURE: 150 MMHG | RESPIRATION RATE: 18 BRPM | DIASTOLIC BLOOD PRESSURE: 98 MMHG | WEIGHT: 115 LBS

## 2018-08-08 DIAGNOSIS — R09.81 SINUS CONGESTION: ICD-10-CM

## 2018-08-08 DIAGNOSIS — R51.9 NONINTRACTABLE HEADACHE, UNSPECIFIED CHRONICITY PATTERN, UNSPECIFIED HEADACHE TYPE: ICD-10-CM

## 2018-08-08 DIAGNOSIS — R03.0 ELEVATED BLOOD PRESSURE READING WITHOUT DIAGNOSIS OF HYPERTENSION: Primary | ICD-10-CM

## 2018-08-08 PROCEDURE — 3008F BODY MASS INDEX DOCD: CPT | Mod: CPTII,S$GLB,, | Performed by: FAMILY MEDICINE

## 2018-08-08 PROCEDURE — 3080F DIAST BP >= 90 MM HG: CPT | Mod: CPTII,S$GLB,, | Performed by: FAMILY MEDICINE

## 2018-08-08 PROCEDURE — 3077F SYST BP >= 140 MM HG: CPT | Mod: CPTII,S$GLB,, | Performed by: FAMILY MEDICINE

## 2018-08-08 PROCEDURE — 99999 PR PBB SHADOW E&M-EST. PATIENT-LVL III: CPT | Mod: PBBFAC,,, | Performed by: FAMILY MEDICINE

## 2018-08-08 PROCEDURE — 99214 OFFICE O/P EST MOD 30 MIN: CPT | Mod: S$GLB,,, | Performed by: FAMILY MEDICINE

## 2018-08-08 RX ORDER — DICYCLOMINE HYDROCHLORIDE 10 MG/1
10 CAPSULE ORAL 3 TIMES DAILY
Refills: 3 | COMMUNITY
Start: 2018-05-22 | End: 2018-08-08

## 2018-08-08 RX ORDER — CONJUGATED ESTROGENS 0.62 MG/G
CREAM VAGINAL
Refills: 4 | COMMUNITY
Start: 2018-05-08 | End: 2018-09-28

## 2018-08-08 RX ORDER — NITROFURANTOIN 25; 75 MG/1; MG/1
CAPSULE ORAL
Refills: 0 | COMMUNITY
Start: 2018-05-08 | End: 2018-08-08

## 2018-08-08 RX ORDER — FLUTICASONE PROPIONATE 50 MCG
2 SPRAY, SUSPENSION (ML) NASAL DAILY
Qty: 1 BOTTLE | Refills: 0 | Status: SHIPPED | OUTPATIENT
Start: 2018-08-08 | End: 2018-09-28

## 2018-08-08 RX ORDER — LISINOPRIL 5 MG/1
5 TABLET ORAL DAILY
Qty: 90 TABLET | Refills: 3 | Status: SHIPPED | OUTPATIENT
Start: 2018-08-08 | End: 2018-09-28 | Stop reason: DRUGHIGH

## 2018-08-08 NOTE — PROGRESS NOTES
HPI:  Sarahy Ma is a 58 y.o. year old female that  presents with complaint of headache that started last week. Her blood pressure has been elevated. She is generally good health .She has ha dhistory of tongue cancer but has been doing well.  Chief Complaint   Patient presents with    Hypertension    Headache   .     HPI      Past Medical History:   Diagnosis Date    Postablative hypothyroidism     secondary to the radiation treatment for tongue cancer - treated by oncologist    Tongue cancer     Ulcerative colitis     Treated by Dr. Tobias     Social History     Socioeconomic History    Marital status:      Spouse name: Not on file    Number of children: Not on file    Years of education: Not on file    Highest education level: Not on file   Social Needs    Financial resource strain: Not on file    Food insecurity - worry: Not on file    Food insecurity - inability: Not on file    Transportation needs - medical: Not on file    Transportation needs - non-medical: Not on file   Occupational History    Not on file   Tobacco Use    Smoking status: Former Smoker    Smokeless tobacco: Never Used   Substance and Sexual Activity    Alcohol use: Yes     Comment: occasionally    Drug use: No    Sexual activity: Not on file   Other Topics Concern    Not on file   Social History Narrative    Not on file     Past Surgical History:   Procedure Laterality Date     SECTION      CHOLECYSTECTOMY  2016    removed    CHOLECYSTECTOMY  2016    COLONOSCOPY      Dr. Tobias    feeding tube      groin surgery  2017    swollen lump node    PORTACATH PLACEMENT      TONSILLECTOMY      TUBAL LIGATION       Family History   Problem Relation Age of Onset    Hypertension Father     Diabetes Father     Hypertension Mother     Thyroid disease Sister     Thyroid disease Brother     Thyroid disease Sister     Thyroid disease Brother            Review of Systems  General ROS:  "negative for chills, fever or weight loss  ENT ROS: negative for epistaxis, sore throat or rhinorrhea  Respiratory ROS: no cough, shortness of breath, or wheezing  Cardiovascular ROS: no chest pain or dyspnea on exertion  Gastrointestinal ROS: no abdominal pain, change in bowel habits, or black/ bloody stools    Physical Exam:  BP (!) 150/98   Pulse 83   Temp 98.2 °F (36.8 °C) (Oral)   Resp 18   Ht 5' 1.5" (1.562 m)   Wt 52.2 kg (115 lb)   SpO2 99%   BMI 21.38 kg/m²   General appearance: alert, cooperative, no distress  Constitutional:Oriented to person, place, and time.appears well-developed and well-nourished.  HEENT: Normocephalic, atraumatic, neck symmetrical, no nasal discharge, TM- clear bilaterally  Lungs: clear to auscultation bilaterally, no dullness to percussion bilaterally  Heart: regular rate and rhythm without rub; no displacement of the PMI , S1&S2 present  Abdomen: soft, non-tender; bowel sounds normoactive; no organomegaly  Physical Exam    LABS:    Complete Blood Count  Lab Results   Component Value Date    RBC 4.42 12/28/2017    HGB 13.5 12/28/2017    HCT 39.7 12/28/2017    MCV 89.8 12/28/2017    MCH 30.5 12/28/2017    MCHC 34.0 12/28/2017    RDW 11.8 12/28/2017     12/28/2017    MPV 9.6 12/28/2017    GRAN 3.3 11/14/2016    GRAN 57.7 11/14/2016    LYMPH 1,331 12/28/2017    LYMPH 31.7 12/28/2017    MONO 554 12/28/2017    MONO 13.2 12/28/2017    EOS 92 12/28/2017    BASO 21 12/28/2017    EOSINOPHIL 2.2 12/28/2017    BASOPHIL 0.5 12/28/2017    DIFFMETHOD Automated 11/14/2016       Comprehensive Metabolic Panel  Lab Results   Component Value Date    GLU 88 05/30/2017    BUN 12 05/30/2017    CREATININE 1.04 05/30/2017     05/30/2017    K 4.8 05/30/2017     05/30/2017    PROT 6.9 05/30/2017    ALBUMIN 4.1 05/30/2017    BILITOT 0.4 05/30/2017    AST 16 05/30/2017    ALKPHOS 98 05/30/2017    CO2 26 05/30/2017    ALT 11 05/30/2017    ANIONGAP 13 11/14/2016    EGFRNONAA 60 " 05/30/2017    ESTGFRAFRICA 70 05/30/2017       LIPID  Lab Results   Component Value Date    CHOL 203 (H) 05/30/2017    HDL 68 05/30/2017         TSH  Lab Results   Component Value Date    TSH 1.25 12/28/2017       Current Outpatient Medications   Medication Sig Dispense Refill    LIALDA 1.2 gram TbEC Take 2.4 g by mouth every morning.  4    PREMARIN vaginal cream INSERT 1/2 GRAM VAGINALLY TWICE A WEEK  4    SYNTHROID 75 mcg tablet TAKE 1 TABLET BY MOUTH ONCE DAILY. 30 tablet 5    VITAMIN B COMPLEX (B COMPLEX-VITAMIN B12 ORAL) Take by mouth.      fluticasone (FLONASE) 50 mcg/actuation nasal spray 2 sprays (100 mcg total) by Each Nare route once daily. 1 Bottle 0    lisinopril (PRINIVIL,ZESTRIL) 5 MG tablet Take 1 tablet (5 mg total) by mouth once daily. 90 tablet 3     No current facility-administered medications for this visit.        Assessment:    ICD-10-CM ICD-9-CM    1. Elevated blood pressure reading without diagnosis of hypertension R03.0 796.2 Comprehensive metabolic panel      Lipid panel      CBC auto differential      TSH      lisinopril (PRINIVIL,ZESTRIL) 5 MG tablet   2. Nonintractable headache, unspecified chronicity pattern, unspecified headache type R51 784.0    3. Sinus congestion R09.81 478.19 fluticasone (FLONASE) 50 mcg/actuation nasal spray         Plan:  Will start lisinopril 5 mg to control BP and evaluate if this controls her headaches.  Follow-up in 9 days (on 8/17/2018).          Gabrielle Bell MD

## 2018-08-14 ENCOUNTER — LAB VISIT (OUTPATIENT)
Dept: LAB | Facility: HOSPITAL | Age: 58
End: 2018-08-14
Attending: FAMILY MEDICINE
Payer: COMMERCIAL

## 2018-08-14 DIAGNOSIS — R03.0 ELEVATED BLOOD PRESSURE READING WITHOUT DIAGNOSIS OF HYPERTENSION: ICD-10-CM

## 2018-08-14 LAB
ALBUMIN SERPL BCP-MCNC: 4.3 G/DL
ALP SERPL-CCNC: 84 U/L
ALT SERPL W/O P-5'-P-CCNC: 16 U/L
ANION GAP SERPL CALC-SCNC: 6 MMOL/L
AST SERPL-CCNC: 22 U/L
BASOPHILS # BLD AUTO: 0.02 K/UL
BASOPHILS NFR BLD: 0.5 %
BILIRUB SERPL-MCNC: 0.5 MG/DL
BUN SERPL-MCNC: 14 MG/DL
CALCIUM SERPL-MCNC: 9.6 MG/DL
CHLORIDE SERPL-SCNC: 104 MMOL/L
CHOLEST SERPL-MCNC: 221 MG/DL
CHOLEST/HDLC SERPL: 3.3 {RATIO}
CO2 SERPL-SCNC: 29 MMOL/L
CREAT SERPL-MCNC: 0.88 MG/DL
DIFFERENTIAL METHOD: NORMAL
EOSINOPHIL # BLD AUTO: 0.1 K/UL
EOSINOPHIL NFR BLD: 2.1 %
ERYTHROCYTE [DISTWIDTH] IN BLOOD BY AUTOMATED COUNT: 12.6 %
EST. GFR  (AFRICAN AMERICAN): >60 ML/MIN/1.73 M^2
EST. GFR  (NON AFRICAN AMERICAN): >60 ML/MIN/1.73 M^2
GLUCOSE SERPL-MCNC: 85 MG/DL
HCT VFR BLD AUTO: 42 %
HDLC SERPL-MCNC: 68 MG/DL
HDLC SERPL: 30.8 %
HGB BLD-MCNC: 14.2 G/DL
LDLC SERPL CALC-MCNC: 135.8 MG/DL
LYMPHOCYTES # BLD AUTO: 1.5 K/UL
LYMPHOCYTES NFR BLD: 36.3 %
MCH RBC QN AUTO: 30.3 PG
MCHC RBC AUTO-ENTMCNC: 33.8 G/DL
MCV RBC AUTO: 90 FL
MONOCYTES # BLD AUTO: 0.5 K/UL
MONOCYTES NFR BLD: 11.2 %
NEUTROPHILS # BLD AUTO: 2.1 K/UL
NEUTROPHILS NFR BLD: 49.9 %
NONHDLC SERPL-MCNC: 153 MG/DL
PLATELET # BLD AUTO: 186 K/UL
PMV BLD AUTO: 9.6 FL
POTASSIUM SERPL-SCNC: 4.7 MMOL/L
PROT SERPL-MCNC: 7.5 G/DL
RBC # BLD AUTO: 4.69 M/UL
SODIUM SERPL-SCNC: 139 MMOL/L
TRIGL SERPL-MCNC: 86 MG/DL
TSH SERPL DL<=0.005 MIU/L-ACNC: 1.42 UIU/ML
WBC # BLD AUTO: 4.19 K/UL

## 2018-08-14 PROCEDURE — 80053 COMPREHEN METABOLIC PANEL: CPT | Mod: PO

## 2018-08-14 PROCEDURE — 85025 COMPLETE CBC W/AUTO DIFF WBC: CPT | Mod: PO

## 2018-08-14 PROCEDURE — 84443 ASSAY THYROID STIM HORMONE: CPT | Mod: PO

## 2018-08-14 PROCEDURE — 36415 COLL VENOUS BLD VENIPUNCTURE: CPT | Mod: PO

## 2018-08-14 PROCEDURE — 80061 LIPID PANEL: CPT

## 2018-08-17 ENCOUNTER — OFFICE VISIT (OUTPATIENT)
Dept: FAMILY MEDICINE | Facility: CLINIC | Age: 58
End: 2018-08-17
Payer: COMMERCIAL

## 2018-08-17 VITALS
OXYGEN SATURATION: 98 % | TEMPERATURE: 98 F | SYSTOLIC BLOOD PRESSURE: 112 MMHG | DIASTOLIC BLOOD PRESSURE: 74 MMHG | HEART RATE: 76 BPM | HEIGHT: 62 IN | BODY MASS INDEX: 21.9 KG/M2 | RESPIRATION RATE: 16 BRPM | WEIGHT: 119 LBS

## 2018-08-17 DIAGNOSIS — R03.0 ELEVATED BLOOD PRESSURE READING WITHOUT DIAGNOSIS OF HYPERTENSION: Primary | ICD-10-CM

## 2018-08-17 DIAGNOSIS — E03.9 ACQUIRED HYPOTHYROIDISM: ICD-10-CM

## 2018-08-17 DIAGNOSIS — E78.00 PURE HYPERCHOLESTEROLEMIA: ICD-10-CM

## 2018-08-17 PROCEDURE — 3078F DIAST BP <80 MM HG: CPT | Mod: CPTII,S$GLB,, | Performed by: FAMILY MEDICINE

## 2018-08-17 PROCEDURE — 99214 OFFICE O/P EST MOD 30 MIN: CPT | Mod: S$GLB,,, | Performed by: FAMILY MEDICINE

## 2018-08-17 PROCEDURE — 3008F BODY MASS INDEX DOCD: CPT | Mod: CPTII,S$GLB,, | Performed by: FAMILY MEDICINE

## 2018-08-17 PROCEDURE — 3074F SYST BP LT 130 MM HG: CPT | Mod: CPTII,S$GLB,, | Performed by: FAMILY MEDICINE

## 2018-08-17 PROCEDURE — 99999 PR PBB SHADOW E&M-EST. PATIENT-LVL III: CPT | Mod: PBBFAC,,, | Performed by: FAMILY MEDICINE

## 2018-08-20 NOTE — PROGRESS NOTES
HPI:  Sarahy Ma is a 58 y.o. year old female that  presents with   Chief Complaint   Patient presents with    Hypertension    Follow-up     labs   .     HPI      Past Medical History:   Diagnosis Date    Postablative hypothyroidism     secondary to the radiation treatment for tongue cancer - treated by oncologist    Tongue cancer     Ulcerative colitis     Treated by Dr. Tobias     Social History     Socioeconomic History    Marital status:      Spouse name: Not on file    Number of children: Not on file    Years of education: Not on file    Highest education level: Not on file   Social Needs    Financial resource strain: Not on file    Food insecurity - worry: Not on file    Food insecurity - inability: Not on file    Transportation needs - medical: Not on file    Transportation needs - non-medical: Not on file   Occupational History    Not on file   Tobacco Use    Smoking status: Former Smoker    Smokeless tobacco: Never Used   Substance and Sexual Activity    Alcohol use: Yes     Comment: occasionally    Drug use: No    Sexual activity: Not on file   Other Topics Concern    Not on file   Social History Narrative    Not on file     Past Surgical History:   Procedure Laterality Date     SECTION      CHOLECYSTECTOMY  2016    removed    CHOLECYSTECTOMY  2016    COLONOSCOPY      Dr. Tobias    feeding tube      groin surgery  2017    swollen lump node    PORTACATH PLACEMENT      TONSILLECTOMY      TUBAL LIGATION       Family History   Problem Relation Age of Onset    Hypertension Father     Diabetes Father     Hypertension Mother     Thyroid disease Sister     Thyroid disease Brother     Thyroid disease Sister     Thyroid disease Brother            Review of Systems  General ROS: negative for chills, fever or weight loss  ENT ROS: negative for epistaxis, sore throat or rhinorrhea  Respiratory ROS: no cough, shortness of breath, or  "wheezing  Cardiovascular ROS: no chest pain or dyspnea on exertion  Gastrointestinal ROS: no abdominal pain, change in bowel habits, or black/ bloody stools    Physical Exam:  /74   Pulse 76   Temp 97.8 °F (36.6 °C) (Oral)   Resp 16   Ht 5' 1.5" (1.562 m)   Wt 54 kg (119 lb)   SpO2 98%   BMI 22.12 kg/m²   General appearance: alert, cooperative, no distress  Constitutional:Oriented to person, place, and time.appears well-developed and well-nourished.  HEENT: Normocephalic, atraumatic, neck symmetrical, no nasal discharge, TM- clear bilaterally  Lungs: clear to auscultation bilaterally, no dullness to percussion bilaterally  Heart: regular rate and rhythm without rub; no displacement of the PMI , S1&S2 present  Abdomen: soft, non-tender; bowel sounds normoactive; no organomegaly  Physical Exam    LABS:    Complete Blood Count  Lab Results   Component Value Date    RBC 4.69 08/14/2018    HGB 14.2 08/14/2018    HCT 42.0 08/14/2018    MCV 90 08/14/2018    MCH 30.3 08/14/2018    MCHC 33.8 08/14/2018    RDW 12.6 08/14/2018     08/14/2018    MPV 9.6 08/14/2018    GRAN 2.1 08/14/2018    GRAN 49.9 08/14/2018    LYMPH 1.5 08/14/2018    LYMPH 36.3 08/14/2018    MONO 0.5 08/14/2018    MONO 11.2 08/14/2018    EOS 0.1 08/14/2018    BASO 0.02 08/14/2018    EOSINOPHIL 2.1 08/14/2018    BASOPHIL 0.5 08/14/2018    DIFFMETHOD Automated 08/14/2018       Comprehensive Metabolic Panel  Lab Results   Component Value Date    GLU 85 08/14/2018    BUN 14 08/14/2018    CREATININE 0.88 08/14/2018     08/14/2018    K 4.7 08/14/2018     08/14/2018    PROT 7.5 08/14/2018    ALBUMIN 4.3 08/14/2018    BILITOT 0.5 08/14/2018    AST 22 08/14/2018    ALKPHOS 84 08/14/2018    CO2 29 08/14/2018    ALT 16 08/14/2018    ANIONGAP 6 (L) 08/14/2018    EGFRNONAA >60.0 08/14/2018    ESTGFRAFRICA >60.0 08/14/2018       LIPID  Lab Results   Component Value Date    CHOL 221 (H) 08/14/2018    HDL 68 08/14/2018         TSH  Lab " Results   Component Value Date    TSH 1.420 08/14/2018       Current Outpatient Medications   Medication Sig Dispense Refill    fluticasone (FLONASE) 50 mcg/actuation nasal spray 2 sprays (100 mcg total) by Each Nare route once daily. 1 Bottle 0    LIALDA 1.2 gram TbEC Take 2.4 g by mouth every morning.  4    lisinopril (PRINIVIL,ZESTRIL) 5 MG tablet Take 1 tablet (5 mg total) by mouth once daily. 90 tablet 3    PREMARIN vaginal cream INSERT 1/2 GRAM VAGINALLY TWICE A WEEK  4    SYNTHROID 75 mcg tablet TAKE 1 TABLET BY MOUTH ONCE DAILY. 30 tablet 5    VITAMIN B COMPLEX (B COMPLEX-VITAMIN B12 ORAL) Take by mouth.       No current facility-administered medications for this visit.        Assessment:    ICD-10-CM ICD-9-CM    1. Elevated blood pressure reading without diagnosis of hypertension R03.0 796.2    2. Acquired hypothyroidism E03.9 244.9    3. Pure hypercholesterolemia E78.00 272.0          Plan:    Follow-up in 1 month (on 9/17/2018).          Gabrielle Bell MD

## 2018-08-23 ENCOUNTER — PATIENT MESSAGE (OUTPATIENT)
Dept: FAMILY MEDICINE | Facility: CLINIC | Age: 58
End: 2018-08-23

## 2018-08-24 DIAGNOSIS — E89.0 POSTABLATIVE HYPOTHYROIDISM: ICD-10-CM

## 2018-08-24 RX ORDER — LEVOTHYROXINE SODIUM 75 UG/1
75 TABLET ORAL DAILY
Qty: 30 TABLET | Refills: 0 | Status: SHIPPED | OUTPATIENT
Start: 2018-08-24 | End: 2018-09-26 | Stop reason: SDUPTHER

## 2018-08-25 ENCOUNTER — PATIENT MESSAGE (OUTPATIENT)
Dept: FAMILY MEDICINE | Facility: CLINIC | Age: 58
End: 2018-08-25

## 2018-09-26 ENCOUNTER — TELEPHONE (OUTPATIENT)
Dept: FAMILY MEDICINE | Facility: CLINIC | Age: 58
End: 2018-09-26

## 2018-09-26 DIAGNOSIS — E89.0 POSTABLATIVE HYPOTHYROIDISM: ICD-10-CM

## 2018-09-26 RX ORDER — LEVOTHYROXINE SODIUM 75 UG/1
75 TABLET ORAL DAILY
Qty: 30 TABLET | Refills: 0 | Status: SHIPPED | OUTPATIENT
Start: 2018-09-26 | End: 2018-10-30 | Stop reason: SDUPTHER

## 2018-09-26 NOTE — TELEPHONE ENCOUNTER
Patient states she was taken off of Lisinopril 5mg in August. Patient states her pressure yesterday was 150/105, patient states she took Lisinopril 5mg last night and this morning. Blood pressure this morning was 90/70. Patient states she is feeling ok now. Patient not sure if she should be back on her medication or change it to something else to prevent drops in pressure. Please advise.

## 2018-09-26 NOTE — TELEPHONE ENCOUNTER
----- Message from Edyta Huizar sent at 9/26/2018 12:15 PM CDT -----  Contact: [phone]/self  Patient requesting to speak with you regarding her elevated blood pressure. Please advise.

## 2018-09-28 ENCOUNTER — OFFICE VISIT (OUTPATIENT)
Dept: FAMILY MEDICINE | Facility: CLINIC | Age: 58
End: 2018-09-28
Payer: COMMERCIAL

## 2018-09-28 VITALS
BODY MASS INDEX: 22.08 KG/M2 | DIASTOLIC BLOOD PRESSURE: 82 MMHG | OXYGEN SATURATION: 99 % | HEIGHT: 62 IN | RESPIRATION RATE: 18 BRPM | HEART RATE: 77 BPM | WEIGHT: 120 LBS | SYSTOLIC BLOOD PRESSURE: 128 MMHG | TEMPERATURE: 98 F

## 2018-09-28 DIAGNOSIS — I10 ESSENTIAL HYPERTENSION: Primary | ICD-10-CM

## 2018-09-28 DIAGNOSIS — E78.00 PURE HYPERCHOLESTEROLEMIA: ICD-10-CM

## 2018-09-28 PROCEDURE — 99214 OFFICE O/P EST MOD 30 MIN: CPT | Mod: S$GLB,,, | Performed by: FAMILY MEDICINE

## 2018-09-28 PROCEDURE — 99999 PR PBB SHADOW E&M-EST. PATIENT-LVL III: CPT | Mod: PBBFAC,,, | Performed by: FAMILY MEDICINE

## 2018-09-28 PROCEDURE — 3079F DIAST BP 80-89 MM HG: CPT | Mod: CPTII,S$GLB,, | Performed by: FAMILY MEDICINE

## 2018-09-28 PROCEDURE — 3008F BODY MASS INDEX DOCD: CPT | Mod: CPTII,S$GLB,, | Performed by: FAMILY MEDICINE

## 2018-09-28 PROCEDURE — 3074F SYST BP LT 130 MM HG: CPT | Mod: CPTII,S$GLB,, | Performed by: FAMILY MEDICINE

## 2018-09-28 RX ORDER — LISINOPRIL 2.5 MG/1
2.5 TABLET ORAL DAILY
Qty: 90 TABLET | Refills: 3 | Status: SHIPPED | OUTPATIENT
Start: 2018-09-28 | End: 2019-06-05

## 2018-09-29 NOTE — PROGRESS NOTES
HPI:  Sarahy Ma is a 58 y.o. year old female that  presents for follow-up of blood pressure.  Patient states she started back on her 5 mg of lisinopril when she had spikes in her blood pressure.  She cannot really recall whether or not she was having aches the exception of her stress that day where her blood pressure was elevated but she did feel poorly.  She continues to feel a bit off normal even with her blood pressure and in the normal range today.  She admits that she there may be some anxiety about continuing to work verses [employer].  She really loves job and has anxiety about whether not this time to return not.  She currently does not denies any chest pain shortness of breath change in vision today  Chief Complaint   Patient presents with    Hypertension     pt has been taking Lisinopril 5mg only for the last 3 days--high when not taking meds very low when taking it     .     HPI      Past Medical History:   Diagnosis Date    Postablative hypothyroidism     secondary to the radiation treatment for tongue cancer - treated by oncologist    Tongue cancer     Ulcerative colitis     Treated by Dr. Tobias     Social History     Socioeconomic History    Marital status:      Spouse name: Not on file    Number of children: Not on file    Years of education: Not on file    Highest education level: Not on file   Social Needs    Financial resource strain: Not on file    Food insecurity - worry: Not on file    Food insecurity - inability: Not on file    Transportation needs - medical: Not on file    Transportation needs - non-medical: Not on file   Occupational History    Not on file   Tobacco Use    Smoking status: Former Smoker    Smokeless tobacco: Never Used   Substance and Sexual Activity    Alcohol use: Yes     Comment: occasionally    Drug use: No    Sexual activity: Not on file   Other Topics Concern    Not on file   Social History Narrative    Not on file     Past Surgical  "History:   Procedure Laterality Date     SECTION      CHOLECYSTECTOMY  2016    removed    CHOLECYSTECTOMY  2016    CHOLECYSTECTOMY-LAPAROSCOPIC N/A 2016    [facility]    COLONOSCOPY      Dr. Tobias    feeding tube      groin surgery  2017    swollen lump node    PORTACATH PLACEMENT      TONSILLECTOMY      TUBAL LIGATION       Family History   Problem Relation Age of Onset    Hypertension Father     Diabetes Father     Hypertension Mother     Thyroid disease Sister     Thyroid disease Brother     Thyroid disease Sister     Thyroid disease Brother            Review of Systems  General ROS: negative for chills, fever or weight loss  ENT ROS: negative for epistaxis, sore throat or rhinorrhea  Respiratory ROS: no cough, shortness of breath, or wheezing  Cardiovascular ROS: no chest pain or dyspnea on exertion  Gastrointestinal ROS: no abdominal pain, change in bowel habits, or black/ bloody stools    Physical Exam:  /82   Pulse 77   Temp 98.1 °F (36.7 °C) (Oral)   Resp 18   Ht 5' 1.5" (1.562 m)   Wt 54.4 kg (120 lb)   SpO2 99%   BMI 22.31 kg/m²   General appearance: alert, cooperative, no distress  Constitutional:Oriented to person, place, and time.appears well-developed and well-nourished.  Lungs: clear to auscultation bilaterally, no dullness to percussion bilaterally  Heart: regular rate and rhythm without rub; no displacement of the PMI , S1&S2 present    Physical Exam    LABS:    Complete Blood Count  Lab Results   Component Value Date    RBC 4.69 2018    HGB 14.2 2018    HCT 42.0 2018    MCV 90 2018    MCH 30.3 2018    MCHC 33.8 2018    RDW 12.6 2018     2018    MPV 9.6 2018    GRAN 2.1 2018    GRAN 49.9 2018    LYMPH 1.5 2018    LYMPH 36.3 2018    MONO 0.5 2018    MONO 11.2 2018    EOS 0.1 2018    BASO 0.02 2018    " EOSINOPHIL 2.1 08/14/2018    BASOPHIL 0.5 08/14/2018    DIFFMETHOD Automated 08/14/2018       Comprehensive Metabolic Panel  Lab Results   Component Value Date    GLU 85 08/14/2018    BUN 14 08/14/2018    CREATININE 0.88 08/14/2018     08/14/2018    K 4.7 08/14/2018     08/14/2018    PROT 7.5 08/14/2018    ALBUMIN 4.3 08/14/2018    BILITOT 0.5 08/14/2018    AST 22 08/14/2018    ALKPHOS 84 08/14/2018    CO2 29 08/14/2018    ALT 16 08/14/2018    ANIONGAP 6 (L) 08/14/2018    EGFRNONAA >60.0 08/14/2018    ESTGFRAFRICA >60.0 08/14/2018       LIPID  Lab Results   Component Value Date    CHOL 221 (H) 08/14/2018    HDL 68 08/14/2018         TSH  Lab Results   Component Value Date    TSH 1.420 08/14/2018       Current Outpatient Medications   Medication Sig Dispense Refill    LIALDA 1.2 gram TbEC Take 2.4 g by mouth every morning.  4    SYNTHROID 75 mcg tablet Take 1 tablet (75 mcg total) by mouth once daily. 30 tablet 0    lisinopril (PRINIVIL,ZESTRIL) 2.5 MG tablet Take 1 tablet (2.5 mg total) by mouth once daily. 90 tablet 3     No current facility-administered medications for this visit.        Assessment:    ICD-10-CM ICD-9-CM    1. Essential hypertension I10 401.9 lisinopril (PRINIVIL,ZESTRIL) 2.5 MG tablet   2. Pure hypercholesterolemia E78.00 272.0 Lipid panel         Plan:  Will decrease lisinopril to 2.5 mg p.o. q.day to be taken schedule.  Patient will monitor and keep a log of blood pressure and return to see us in 1 month.    Follow-up in 1 month (on 10/28/2018).          Gabrielle Bell MD

## 2018-10-30 ENCOUNTER — TELEPHONE (OUTPATIENT)
Dept: FAMILY MEDICINE | Facility: CLINIC | Age: 58
End: 2018-10-30

## 2018-10-30 DIAGNOSIS — E89.0 POSTABLATIVE HYPOTHYROIDISM: ICD-10-CM

## 2018-10-30 RX ORDER — LEVOTHYROXINE SODIUM 75 UG/1
TABLET ORAL
Qty: 30 TABLET | Refills: 0 | Status: CANCELLED | OUTPATIENT
Start: 2018-10-30

## 2018-10-30 RX ORDER — LEVOTHYROXINE SODIUM 75 UG/1
75 TABLET ORAL DAILY
Qty: 30 TABLET | Refills: 0 | Status: SHIPPED | OUTPATIENT
Start: 2018-10-30 | End: 2018-11-28 | Stop reason: SDUPTHER

## 2018-10-30 NOTE — TELEPHONE ENCOUNTER
Patient has been seeing Dr. Bell for 3 visits since August 2018 and has follow up coming up with Dr. Bell so regarding the message about wanting to speak with someone about a personal matter - call patient and advise her that Dr. Bell is out of office until tomorrow and see if she wants Dr. Bell to call her on tomorrow or if she would be willing to give more information to you regarding what this concerns.

## 2018-10-30 NOTE — TELEPHONE ENCOUNTER
----- Message from Ghazal Rivas sent at 10/30/2018  9:53 AM CDT -----  Contact: Self [phone]  Patient would like to speak with you about a personal matter. Please advise

## 2018-10-31 DIAGNOSIS — E89.0 POSTABLATIVE HYPOTHYROIDISM: ICD-10-CM

## 2018-10-31 RX ORDER — LEVOTHYROXINE SODIUM 75 UG/1
TABLET ORAL
Qty: 30 TABLET | Refills: 0 | OUTPATIENT
Start: 2018-10-31

## 2018-11-01 NOTE — TELEPHONE ENCOUNTER
I do not see where staff has contacted patient - please call patient to see what she wanted to discuss so we can send info to provider.

## 2018-11-02 ENCOUNTER — LAB VISIT (OUTPATIENT)
Dept: LAB | Facility: HOSPITAL | Age: 58
End: 2018-11-02
Attending: FAMILY MEDICINE
Payer: COMMERCIAL

## 2018-11-02 DIAGNOSIS — E78.00 PURE HYPERCHOLESTEROLEMIA: ICD-10-CM

## 2018-11-02 LAB
CHOLEST SERPL-MCNC: 208 MG/DL
CHOLEST/HDLC SERPL: 2.9 {RATIO}
HDLC SERPL-MCNC: 72 MG/DL
HDLC SERPL: 34.6 %
LDLC SERPL CALC-MCNC: 118 MG/DL
NONHDLC SERPL-MCNC: 136 MG/DL
TRIGL SERPL-MCNC: 90 MG/DL

## 2018-11-02 PROCEDURE — 80061 LIPID PANEL: CPT

## 2018-11-02 PROCEDURE — 36415 COLL VENOUS BLD VENIPUNCTURE: CPT | Mod: PO

## 2018-11-05 ENCOUNTER — OFFICE VISIT (OUTPATIENT)
Dept: FAMILY MEDICINE | Facility: CLINIC | Age: 58
End: 2018-11-05
Payer: COMMERCIAL

## 2018-11-05 VITALS
RESPIRATION RATE: 18 BRPM | HEART RATE: 72 BPM | HEIGHT: 62 IN | OXYGEN SATURATION: 99 % | DIASTOLIC BLOOD PRESSURE: 68 MMHG | SYSTOLIC BLOOD PRESSURE: 102 MMHG | TEMPERATURE: 98 F | WEIGHT: 121.06 LBS | BODY MASS INDEX: 22.28 KG/M2

## 2018-11-05 DIAGNOSIS — M25.50 ARTHRALGIA, UNSPECIFIED JOINT: ICD-10-CM

## 2018-11-05 DIAGNOSIS — M89.8X9 BONE MASS: Primary | ICD-10-CM

## 2018-11-05 PROCEDURE — 99999 PR PBB SHADOW E&M-EST. PATIENT-LVL IV: CPT | Mod: PBBFAC,,, | Performed by: FAMILY MEDICINE

## 2018-11-05 PROCEDURE — 99214 OFFICE O/P EST MOD 30 MIN: CPT | Mod: S$GLB,,, | Performed by: FAMILY MEDICINE

## 2018-11-05 PROCEDURE — 3078F DIAST BP <80 MM HG: CPT | Mod: CPTII,S$GLB,, | Performed by: FAMILY MEDICINE

## 2018-11-05 PROCEDURE — 3008F BODY MASS INDEX DOCD: CPT | Mod: CPTII,S$GLB,, | Performed by: FAMILY MEDICINE

## 2018-11-05 PROCEDURE — 3074F SYST BP LT 130 MM HG: CPT | Mod: CPTII,S$GLB,, | Performed by: FAMILY MEDICINE

## 2018-11-05 NOTE — PROGRESS NOTES
HPI:  Sarahy Ma is a 58 y.o. year old female that  presents with complaint of torso pain with history of tongue cancer. .  She has noticed that on her left clavicle there is and raised element to it.  The same is not located on the right.  She describes her generalized pain is aching pain from the waist up.  Chief Complaint   Patient presents with    Follow-up     lab results--blood pressure check   .     HPI      Past Medical History:   Diagnosis Date    Postablative hypothyroidism     secondary to the radiation treatment for tongue cancer - treated by oncologist    Tongue cancer     Ulcerative colitis     Treated by Dr. Tobias     Social History     Socioeconomic History    Marital status:      Spouse name: Not on file    Number of children: Not on file    Years of education: Not on file    Highest education level: Not on file   Social Needs    Financial resource strain: Not on file    Food insecurity - worry: Not on file    Food insecurity - inability: Not on file    Transportation needs - medical: Not on file    Transportation needs - non-medical: Not on file   Occupational History    Not on file   Tobacco Use    Smoking status: Former Smoker    Smokeless tobacco: Never Used   Substance and Sexual Activity    Alcohol use: Yes     Comment: occasionally    Drug use: No    Sexual activity: Not on file   Other Topics Concern    Not on file   Social History Narrative    Not on file     Past Surgical History:   Procedure Laterality Date     SECTION      CHOLECYSTECTOMY  2016    removed    CHOLECYSTECTOMY  2016    CHOLECYSTECTOMY-LAPAROSCOPIC N/A 2016    [facility]    COLONOSCOPY      Dr. Tobias    feeding tube      groin surgery  2017    swollen lump node    PORTACATH PLACEMENT      TONSILLECTOMY      TUBAL LIGATION       Family History   Problem Relation Age of Onset    Hypertension Father     Diabetes Father      "Hypertension Mother     Thyroid disease Sister     Thyroid disease Brother     Thyroid disease Sister     Thyroid disease Brother            Review of Systems  General ROS: negative for chills, fever or weight loss  ENT ROS: negative for epistaxis, sore throat or rhinorrhea  Respiratory ROS: no cough, shortness of breath, or wheezing  Cardiovascular ROS: no chest pain or dyspnea on exertion  Gastrointestinal ROS: no abdominal pain, change in bowel habits, or black/ bloody stools    Physical Exam:  /68   Pulse 72   Temp 98 °F (36.7 °C) (Oral)   Resp 18   Ht 5' 1.5" (1.562 m)   Wt 54.9 kg (121 lb 0.5 oz)   SpO2 99%   BMI 22.50 kg/m²   General appearance: alert, cooperative, no distress  Constitutional:Oriented to person, place, and time.appears well-developed and well-nourished.  HEENT: Normocephalic, atraumatic, neck symmetrical, no nasal discharge, TM- clear bilaterally  Lungs: clear to auscultation bilaterally, no dullness to percussion bilaterally  Heart: regular rate and rhythm without rub; no displacement of the PMI , S1&S2 present  Abdomen: soft, non-tender; bowel sounds normoactive; no organomegaly  Musculoskeletal:  Visually apparent nodules over the left supraclavicular bones which are firm and nonmobile.  The same is not visualized on the right.  Physical Exam    LABS:    Complete Blood Count  Lab Results   Component Value Date    RBC 4.69 08/14/2018    HGB 14.2 08/14/2018    HCT 42.0 08/14/2018    MCV 90 08/14/2018    MCH 30.3 08/14/2018    MCHC 33.8 08/14/2018    RDW 12.6 08/14/2018     08/14/2018    MPV 9.6 08/14/2018    GRAN 2.1 08/14/2018    GRAN 49.9 08/14/2018    LYMPH 1.5 08/14/2018    LYMPH 36.3 08/14/2018    MONO 0.5 08/14/2018    MONO 11.2 08/14/2018    EOS 0.1 08/14/2018    BASO 0.02 08/14/2018    EOSINOPHIL 2.1 08/14/2018    BASOPHIL 0.5 08/14/2018    DIFFMETHOD Automated 08/14/2018       Comprehensive Metabolic Panel  Lab Results   Component Value Date    GLU 85 " 08/14/2018    BUN 14 08/14/2018    CREATININE 0.88 08/14/2018     08/14/2018    K 4.7 08/14/2018     08/14/2018    PROT 7.5 08/14/2018    ALBUMIN 4.3 08/14/2018    BILITOT 0.5 08/14/2018    AST 22 08/14/2018    ALKPHOS 84 08/14/2018    CO2 29 08/14/2018    ALT 16 08/14/2018    ANIONGAP 6 (L) 08/14/2018    EGFRNONAA >60.0 08/14/2018    ESTGFRAFRICA >60.0 08/14/2018       LIPID  Lab Results   Component Value Date    CHOL 208 (H) 11/02/2018    HDL 72 11/02/2018         TSH  Lab Results   Component Value Date    TSH 1.420 08/14/2018       Current Outpatient Medications   Medication Sig Dispense Refill    LIALDA 1.2 gram TbEC Take 2.4 g by mouth every morning.  4    lisinopril (PRINIVIL,ZESTRIL) 2.5 MG tablet Take 1 tablet (2.5 mg total) by mouth once daily. 90 tablet 3    SYNTHROID 75 mcg tablet Take 1 tablet (75 mcg total) by mouth once daily. 30 tablet 0     No current facility-administered medications for this visit.        Assessment:    ICD-10-CM ICD-9-CM    1. Bone mass M89.9 733.90 X-Ray Chest 4 Or More Views   2. Arthralgia, unspecified joint M25.50 719.40 Sedimentation rate      DANILO      C-reactive protein         Plan:    Follow-up in 2 weeks (on 11/21/2018).          Gabrielle Bell MD

## 2018-11-21 ENCOUNTER — OFFICE VISIT (OUTPATIENT)
Dept: FAMILY MEDICINE | Facility: CLINIC | Age: 58
End: 2018-11-21
Payer: COMMERCIAL

## 2018-11-21 VITALS
WEIGHT: 121 LBS | OXYGEN SATURATION: 98 % | HEIGHT: 62 IN | HEART RATE: 72 BPM | DIASTOLIC BLOOD PRESSURE: 68 MMHG | SYSTOLIC BLOOD PRESSURE: 102 MMHG | RESPIRATION RATE: 18 BRPM | BODY MASS INDEX: 22.26 KG/M2 | TEMPERATURE: 98 F

## 2018-11-21 DIAGNOSIS — M25.50 ARTHRALGIA, UNSPECIFIED JOINT: Primary | ICD-10-CM

## 2018-11-21 DIAGNOSIS — I10 ESSENTIAL HYPERTENSION: ICD-10-CM

## 2018-11-21 PROCEDURE — 3078F DIAST BP <80 MM HG: CPT | Mod: CPTII,S$GLB,, | Performed by: FAMILY MEDICINE

## 2018-11-21 PROCEDURE — 3074F SYST BP LT 130 MM HG: CPT | Mod: CPTII,S$GLB,, | Performed by: FAMILY MEDICINE

## 2018-11-21 PROCEDURE — 99214 OFFICE O/P EST MOD 30 MIN: CPT | Mod: S$GLB,,, | Performed by: FAMILY MEDICINE

## 2018-11-21 PROCEDURE — 99999 PR PBB SHADOW E&M-EST. PATIENT-LVL III: CPT | Mod: PBBFAC,,, | Performed by: FAMILY MEDICINE

## 2018-11-21 PROCEDURE — 3008F BODY MASS INDEX DOCD: CPT | Mod: CPTII,S$GLB,, | Performed by: FAMILY MEDICINE

## 2018-11-28 DIAGNOSIS — E89.0 POSTABLATIVE HYPOTHYROIDISM: ICD-10-CM

## 2018-11-30 RX ORDER — LEVOTHYROXINE SODIUM 75 UG/1
TABLET ORAL
Qty: 30 TABLET | Refills: 2 | Status: SHIPPED | OUTPATIENT
Start: 2018-11-30 | End: 2019-02-26 | Stop reason: SDUPTHER

## 2019-01-23 NOTE — PROGRESS NOTES
HPI:  Sarahy Ma is a 58 y.o. year old female that  presents for follow-up of test results.  She states that her back pain is still an issue.  Chief Complaint   Patient presents with    Follow-up     xray/lab results   .     HPI      Past Medical History:   Diagnosis Date    Postablative hypothyroidism     secondary to the radiation treatment for tongue cancer - treated by oncologist    Tongue cancer     Ulcerative colitis     Treated by Dr. Tobias     Social History     Socioeconomic History    Marital status:      Spouse name: Not on file    Number of children: Not on file    Years of education: Not on file    Highest education level: Not on file   Social Needs    Financial resource strain: Not on file    Food insecurity - worry: Not on file    Food insecurity - inability: Not on file    Transportation needs - medical: Not on file    Transportation needs - non-medical: Not on file   Occupational History    Not on file   Tobacco Use    Smoking status: Former Smoker    Smokeless tobacco: Never Used   Substance and Sexual Activity    Alcohol use: Yes     Comment: occasionally    Drug use: No    Sexual activity: Not on file   Other Topics Concern    Not on file   Social History Narrative    Not on file     Past Surgical History:   Procedure Laterality Date     SECTION      CHOLECYSTECTOMY  2016    removed    CHOLECYSTECTOMY  2016    CHOLECYSTECTOMY-LAPAROSCOPIC N/A 2016    [facility]    COLONOSCOPY      Dr. Tobias    feeding tube      groin surgery  2017    swollen lump node    PORTACATH PLACEMENT      TONSILLECTOMY      TUBAL LIGATION       Family History   Problem Relation Age of Onset    Hypertension Father     Diabetes Father     Hypertension Mother     Thyroid disease Sister     Thyroid disease Brother     Thyroid disease Sister     Thyroid disease Brother            Review of Systems  General ROS: negative  "for chills, fever or weight loss  ENT ROS: negative for epistaxis, sore throat or rhinorrhea  Respiratory ROS: no cough, shortness of breath, or wheezing  Cardiovascular ROS: no chest pain or dyspnea on exertion  Gastrointestinal ROS: no abdominal pain, change in bowel habits, or black/ bloody stools  Musculoskeletal:  Chronic back pain  Physical Exam:  /68   Pulse 72   Temp 98.3 °F (36.8 °C) (Oral)   Resp 18   Ht 5' 1.5" (1.562 m)   Wt 54.9 kg (121 lb)   SpO2 98%   BMI 22.49 kg/m²   General appearance: alert, cooperative, no distress  Constitutional:Oriented to person, place, and time.appears well-developed and well-nourished.  Lungs: clear to auscultation bilaterally, no dullness to percussion bilaterally  Heart: regular rate and rhythm without rub; no displacement of the PMI , S1&S2 present  Musculoskeletal:  No change on exam.  Physical Exam    LABS:    Complete Blood Count  Lab Results   Component Value Date    RBC 4.69 08/14/2018    HGB 14.2 08/14/2018    HCT 42.0 08/14/2018    MCV 90 08/14/2018    MCH 30.3 08/14/2018    MCHC 33.8 08/14/2018    RDW 12.6 08/14/2018     08/14/2018    MPV 9.6 08/14/2018    GRAN 2.1 08/14/2018    GRAN 49.9 08/14/2018    LYMPH 1.5 08/14/2018    LYMPH 36.3 08/14/2018    MONO 0.5 08/14/2018    MONO 11.2 08/14/2018    EOS 0.1 08/14/2018    BASO 0.02 08/14/2018    EOSINOPHIL 2.1 08/14/2018    BASOPHIL 0.5 08/14/2018    DIFFMETHOD Automated 08/14/2018       Comprehensive Metabolic Panel  Lab Results   Component Value Date    GLU 85 08/14/2018    BUN 14 08/14/2018    CREATININE 0.88 08/14/2018     08/14/2018    K 4.7 08/14/2018     08/14/2018    PROT 7.5 08/14/2018    ALBUMIN 4.3 08/14/2018    BILITOT 0.5 08/14/2018    AST 22 08/14/2018    ALKPHOS 84 08/14/2018    CO2 29 08/14/2018    ALT 16 08/14/2018    ANIONGAP 6 (L) 08/14/2018    EGFRNONAA >60.0 08/14/2018    ESTGFRAFRICA >60.0 08/14/2018       LIPID  Lab Results   Component Value Date    CHOL 208 (H) " 11/02/2018    HDL 72 11/02/2018         TSH  Lab Results   Component Value Date    TSH 1.420 08/14/2018    Better way of yet increase the no for 50 S the computer increase it to 4    Current Outpatient Medications   Medication Sig Dispense Refill    LIALDA 1.2 gram TbEC Take 2.4 g by mouth every morning.  4    lisinopril (PRINIVIL,ZESTRIL) 2.5 MG tablet Take 1 tablet (2.5 mg total) by mouth once daily. 90 tablet 3    SYNTHROID 75 mcg tablet Take 1 tablet (75 mcg total) by mouth once daily. 30 tablet 0     No current facility-administered medications for this visit.        Assessment:    ICD-10-CM ICD-9-CM    1. Arthralgia, unspecified joint M25.50 719.40    2. Essential hypertension I10 401.9          Plan:  Patient instructed to try glucosamine chondroitin, extra-strength Tylenol scheduled t.i.d., and tumeric for control of back pain.  Follow-up in about 3 months (around 2/21/2019).          Gabrielle Bell MD   weight-bearing as tolerated

## 2019-02-02 ENCOUNTER — PATIENT MESSAGE (OUTPATIENT)
Dept: FAMILY MEDICINE | Facility: CLINIC | Age: 59
End: 2019-02-02

## 2019-02-22 DIAGNOSIS — Z12.39 BREAST CANCER SCREENING: ICD-10-CM

## 2019-02-26 DIAGNOSIS — E89.0 POSTABLATIVE HYPOTHYROIDISM: ICD-10-CM

## 2019-02-27 RX ORDER — LEVOTHYROXINE SODIUM 75 UG/1
TABLET ORAL
Qty: 30 TABLET | Refills: 2 | Status: SHIPPED | OUTPATIENT
Start: 2019-02-27 | End: 2019-05-28

## 2019-04-16 ENCOUNTER — PATIENT OUTREACH (OUTPATIENT)
Dept: ADMINISTRATIVE | Facility: HOSPITAL | Age: 59
End: 2019-04-16

## 2019-05-28 PROCEDURE — 99292 CRITICAL CARE ADDL 30 MIN: CPT

## 2019-05-28 PROCEDURE — 99291 CRITICAL CARE FIRST HOUR: CPT | Mod: 25

## 2019-05-29 ENCOUNTER — HOSPITAL ENCOUNTER (OUTPATIENT)
Facility: HOSPITAL | Age: 59
Discharge: HOME | End: 2019-05-29
Attending: HOSPITALIST | Admitting: HOSPITALIST
Payer: COMMERCIAL

## 2019-05-29 VITALS
DIASTOLIC BLOOD PRESSURE: 81 MMHG | RESPIRATION RATE: 18 BRPM | HEART RATE: 83 BPM | TEMPERATURE: 98 F | SYSTOLIC BLOOD PRESSURE: 127 MMHG | OXYGEN SATURATION: 100 % | HEIGHT: 62 IN | BODY MASS INDEX: 17.85 KG/M2 | WEIGHT: 97 LBS

## 2019-05-29 DIAGNOSIS — R53.1 WEAKNESS: ICD-10-CM

## 2019-05-29 DIAGNOSIS — E87.1 HYPONATREMIA: Primary | ICD-10-CM

## 2019-05-29 DIAGNOSIS — E87.6 HYPOKALEMIA: ICD-10-CM

## 2019-05-29 DIAGNOSIS — E89.0 POSTABLATIVE HYPOTHYROIDISM: ICD-10-CM

## 2019-05-29 LAB
ALBUMIN SERPL BCP-MCNC: 4.4 G/DL (ref 3.5–5.2)
ALP SERPL-CCNC: 78 U/L (ref 55–135)
ALT SERPL W/O P-5'-P-CCNC: 13 U/L (ref 10–44)
ANION GAP SERPL CALC-SCNC: 12 MMOL/L (ref 8–16)
ANION GAP SERPL CALC-SCNC: 8 MMOL/L (ref 8–16)
AST SERPL-CCNC: 21 U/L (ref 10–40)
BASOPHILS # BLD AUTO: 0.02 K/UL (ref 0–0.2)
BASOPHILS NFR BLD: 0.3 % (ref 0–1.9)
BILIRUB SERPL-MCNC: 1.4 MG/DL (ref 0.1–1)
BILIRUB UR QL STRIP: NEGATIVE
BUN SERPL-MCNC: 7 MG/DL (ref 6–20)
BUN SERPL-MCNC: 8 MG/DL (ref 6–20)
CALCIUM SERPL-MCNC: 9.2 MG/DL (ref 8.7–10.5)
CALCIUM SERPL-MCNC: 9.5 MG/DL (ref 8.7–10.5)
CHLORIDE SERPL-SCNC: 103 MMOL/L (ref 95–110)
CHLORIDE SERPL-SCNC: 95 MMOL/L (ref 95–110)
CLARITY UR: CLEAR
CO2 SERPL-SCNC: 18 MMOL/L (ref 23–29)
CO2 SERPL-SCNC: 22 MMOL/L (ref 23–29)
COLOR UR: YELLOW
CREAT SERPL-MCNC: 0.8 MG/DL (ref 0.5–1.4)
CREAT SERPL-MCNC: 0.8 MG/DL (ref 0.5–1.4)
DIFFERENTIAL METHOD: ABNORMAL
EOSINOPHIL # BLD AUTO: 0.1 K/UL (ref 0–0.5)
EOSINOPHIL NFR BLD: 0.7 % (ref 0–8)
ERYTHROCYTE [DISTWIDTH] IN BLOOD BY AUTOMATED COUNT: 11.8 % (ref 11.5–14.5)
EST. GFR  (AFRICAN AMERICAN): >60 ML/MIN/1.73 M^2
EST. GFR  (AFRICAN AMERICAN): >60 ML/MIN/1.73 M^2
EST. GFR  (NON AFRICAN AMERICAN): >60 ML/MIN/1.73 M^2
EST. GFR  (NON AFRICAN AMERICAN): >60 ML/MIN/1.73 M^2
GLUCOSE SERPL-MCNC: 122 MG/DL (ref 70–110)
GLUCOSE SERPL-MCNC: 161 MG/DL (ref 70–110)
GLUCOSE UR QL STRIP: NEGATIVE
HCT VFR BLD AUTO: 38.5 % (ref 37–48.5)
HGB BLD-MCNC: 13.4 G/DL (ref 12–16)
HGB UR QL STRIP: ABNORMAL
KETONES UR QL STRIP: NEGATIVE
LEUKOCYTE ESTERASE UR QL STRIP: NEGATIVE
LIPASE SERPL-CCNC: 20 U/L (ref 4–60)
LYMPHOCYTES # BLD AUTO: 1.5 K/UL (ref 1–4.8)
LYMPHOCYTES NFR BLD: 19.5 % (ref 18–48)
MCH RBC QN AUTO: 30.6 PG (ref 27–31)
MCHC RBC AUTO-ENTMCNC: 34.8 G/DL (ref 32–36)
MCV RBC AUTO: 88 FL (ref 82–98)
MICROSCOPIC COMMENT: ABNORMAL
MONOCYTES # BLD AUTO: 0.6 K/UL (ref 0.3–1)
MONOCYTES NFR BLD: 7.7 % (ref 4–15)
NEUTROPHILS # BLD AUTO: 5.4 K/UL (ref 1.8–7.7)
NEUTROPHILS NFR BLD: 71.7 % (ref 38–73)
NITRITE UR QL STRIP: NEGATIVE
PH UR STRIP: 5 [PH] (ref 5–8)
PLATELET # BLD AUTO: 175 K/UL (ref 150–350)
PMV BLD AUTO: 8.8 FL (ref 9.2–12.9)
POTASSIUM SERPL-SCNC: 3 MMOL/L (ref 3.5–5.1)
POTASSIUM SERPL-SCNC: 3.9 MMOL/L (ref 3.5–5.1)
PROT SERPL-MCNC: 6.9 G/DL (ref 6–8.4)
PROT UR QL STRIP: NEGATIVE
RBC # BLD AUTO: 4.38 M/UL (ref 4–5.4)
RBC #/AREA URNS HPF: 5 /HPF (ref 0–4)
SODIUM SERPL-SCNC: 125 MMOL/L (ref 136–145)
SODIUM SERPL-SCNC: 133 MMOL/L (ref 136–145)
SP GR UR STRIP: 1.01 (ref 1–1.03)
TROPONIN I SERPL DL<=0.01 NG/ML-MCNC: <0.006 NG/ML (ref 0–0.03)
URN SPEC COLLECT METH UR: ABNORMAL
UROBILINOGEN UR STRIP-ACNC: NEGATIVE EU/DL
WBC # BLD AUTO: 7.45 K/UL (ref 3.9–12.7)

## 2019-05-29 PROCEDURE — 93010 ELECTROCARDIOGRAM REPORT: CPT | Mod: ,,, | Performed by: INTERNAL MEDICINE

## 2019-05-29 PROCEDURE — 25000003 PHARM REV CODE 250: Performed by: NURSE PRACTITIONER

## 2019-05-29 PROCEDURE — 93005 ELECTROCARDIOGRAM TRACING: CPT

## 2019-05-29 PROCEDURE — 83690 ASSAY OF LIPASE: CPT

## 2019-05-29 PROCEDURE — 93010 EKG 12-LEAD: ICD-10-PCS | Mod: ,,, | Performed by: INTERNAL MEDICINE

## 2019-05-29 PROCEDURE — 96374 THER/PROPH/DIAG INJ IV PUSH: CPT

## 2019-05-29 PROCEDURE — 96375 TX/PRO/DX INJ NEW DRUG ADDON: CPT

## 2019-05-29 PROCEDURE — G0378 HOSPITAL OBSERVATION PER HR: HCPCS

## 2019-05-29 PROCEDURE — 81003 URINALYSIS AUTO W/O SCOPE: CPT

## 2019-05-29 PROCEDURE — 63600175 PHARM REV CODE 636 W HCPCS: Performed by: NURSE PRACTITIONER

## 2019-05-29 PROCEDURE — 36415 COLL VENOUS BLD VENIPUNCTURE: CPT

## 2019-05-29 PROCEDURE — 85025 COMPLETE CBC W/AUTO DIFF WBC: CPT

## 2019-05-29 PROCEDURE — 81000 URINALYSIS NONAUTO W/SCOPE: CPT

## 2019-05-29 PROCEDURE — 94761 N-INVAS EAR/PLS OXIMETRY MLT: CPT

## 2019-05-29 PROCEDURE — 96361 HYDRATE IV INFUSION ADD-ON: CPT

## 2019-05-29 PROCEDURE — 84484 ASSAY OF TROPONIN QUANT: CPT

## 2019-05-29 PROCEDURE — 80048 BASIC METABOLIC PNL TOTAL CA: CPT

## 2019-05-29 PROCEDURE — 80053 COMPREHEN METABOLIC PANEL: CPT

## 2019-05-29 RX ORDER — POTASSIUM CHLORIDE 20 MEQ/1
40 TABLET, EXTENDED RELEASE ORAL
Status: COMPLETED | OUTPATIENT
Start: 2019-05-29 | End: 2019-05-29

## 2019-05-29 RX ORDER — LEVOTHYROXINE SODIUM 75 UG/1
75 TABLET ORAL
Qty: 30 TABLET | Refills: 11 | Status: SHIPPED | OUTPATIENT
Start: 2019-05-29 | End: 2020-05-28

## 2019-05-29 RX ORDER — SODIUM CHLORIDE 9 MG/ML
500 INJECTION, SOLUTION INTRAVENOUS
Status: COMPLETED | OUTPATIENT
Start: 2019-05-29 | End: 2019-05-29

## 2019-05-29 RX ORDER — PROCHLORPERAZINE EDISYLATE 5 MG/ML
10 INJECTION INTRAMUSCULAR; INTRAVENOUS
Status: COMPLETED | OUTPATIENT
Start: 2019-05-29 | End: 2019-05-29

## 2019-05-29 RX ORDER — ACETAMINOPHEN 325 MG/1
650 TABLET ORAL EVERY 4 HOURS PRN
Status: DISCONTINUED | OUTPATIENT
Start: 2019-05-29 | End: 2019-05-29 | Stop reason: HOSPADM

## 2019-05-29 RX ORDER — ONDANSETRON 2 MG/ML
4 INJECTION INTRAMUSCULAR; INTRAVENOUS
Status: COMPLETED | OUTPATIENT
Start: 2019-05-29 | End: 2019-05-29

## 2019-05-29 RX ORDER — SODIUM CHLORIDE 9 MG/ML
INJECTION, SOLUTION INTRAVENOUS CONTINUOUS
Status: DISCONTINUED | OUTPATIENT
Start: 2019-05-29 | End: 2019-05-29 | Stop reason: HOSPADM

## 2019-05-29 RX ORDER — SODIUM CHLORIDE 0.9 % (FLUSH) 0.9 %
10 SYRINGE (ML) INJECTION
Status: DISCONTINUED | OUTPATIENT
Start: 2019-05-29 | End: 2019-05-29 | Stop reason: HOSPADM

## 2019-05-29 RX ORDER — ONDANSETRON 2 MG/ML
4 INJECTION INTRAMUSCULAR; INTRAVENOUS EVERY 8 HOURS PRN
Status: DISCONTINUED | OUTPATIENT
Start: 2019-05-29 | End: 2019-05-29 | Stop reason: HOSPADM

## 2019-05-29 RX ORDER — LEVOTHYROXINE SODIUM 25 UG/1
75 TABLET ORAL
Status: DISCONTINUED | OUTPATIENT
Start: 2019-05-29 | End: 2019-05-29 | Stop reason: HOSPADM

## 2019-05-29 RX ADMIN — SODIUM CHLORIDE 500 ML: 0.9 INJECTION, SOLUTION INTRAVENOUS at 01:05

## 2019-05-29 RX ADMIN — ONDANSETRON 4 MG: 2 INJECTION INTRAMUSCULAR; INTRAVENOUS at 12:05

## 2019-05-29 RX ADMIN — SODIUM CHLORIDE: 0.9 INJECTION, SOLUTION INTRAVENOUS at 03:05

## 2019-05-29 RX ADMIN — PROCHLORPERAZINE EDISYLATE 10 MG: 5 INJECTION INTRAMUSCULAR; INTRAVENOUS at 01:05

## 2019-05-29 RX ADMIN — POTASSIUM CHLORIDE 40 MEQ: 20 TABLET, EXTENDED RELEASE ORAL at 02:05

## 2019-05-29 RX ADMIN — LEVOTHYROXINE SODIUM 75 MCG: 25 TABLET ORAL at 09:05

## 2019-05-29 NOTE — ASSESSMENT & PLAN NOTE
-Gave gentle IVF hydration  -Repeated BMP   -Na---125>133; K--3.0>3.9  -follow up outpatient with PCP

## 2019-05-29 NOTE — HPI
Sarahy Ma is a 59-year-old female with a past medical history of hypothyroidism secondary to radiation treatment for tongue cancer (dx in 2009; cisplatin/cetuximab/XRT completed in 2009), iron deficiency anemia and ulcerative colitis presented to the ED complaining of generalized weakness and malaise after beginning a bowel prep for a colonoscopy at approximately 4:00 p.m. Patient denies chest pain, abdominal pain, SOB and fever/chills. She reports lightheadedness, nausea, and head pressure, but denies sensation of the room spinning. She estimates approximately 8 episodes of nonbloody diarrhea since beginning bowel prep. No vomiting.   Labs in ED remarkable for Na 125, K 3.0. She received 40 meq potassium and 1 L normal saline in ED. Patient hypertensive on arrival. Patient admitted  to CDU for further care.

## 2019-05-29 NOTE — NURSING
Patient able to put out approximately 1300cc of urine in three hours with fluids running at 75cc/hr. No diarrhea or nausea or vomiting.  at bedside.

## 2019-05-29 NOTE — SUBJECTIVE & OBJECTIVE
Past Medical History:   Diagnosis Date    Postablative hypothyroidism     secondary to the radiation treatment for tongue cancer - treated by oncologist    Tongue cancer     Ulcerative colitis     Treated by Dr. Tobias       Past Surgical History:   Procedure Laterality Date     SECTION      CHOLECYSTECTOMY  2016    removed    CHOLECYSTECTOMY  2016    CHOLECYSTECTOMY-LAPAROSCOPIC N/A 2016    Performed by Chantel Park MD at [facility] OR    COLONOSCOPY      Dr. Tobias    feeding tube      groin surgery  2017    swollen lump node    PORTACATH PLACEMENT      TONSILLECTOMY      TUBAL LIGATION         Review of patient's allergies indicates:  No Known Allergies    No current facility-administered medications on file prior to encounter.      Current Outpatient Medications on File Prior to Encounter   Medication Sig    LIALDA 1.2 gram TbEC Take 2.4 g by mouth every morning.    lisinopril (PRINIVIL,ZESTRIL) 2.5 MG tablet Take 1 tablet (2.5 mg total) by mouth once daily.     Family History     Problem Relation (Age of Onset)    Diabetes Father    Hypertension Father, Mother    Thyroid disease Sister, Brother, Sister, Brother        Tobacco Use    Smoking status: Former Smoker    Smokeless tobacco: Never Used   Substance and Sexual Activity    Alcohol use: Yes     Comment: occasionally    Drug use: No    Sexual activity: Not on file     Review of Systems   Constitutional: Negative for chills, diaphoresis and fever.   Eyes: Negative for photophobia.   Respiratory: Negative for cough, chest tightness, shortness of breath and wheezing.    Cardiovascular: Negative for chest pain, palpitations and leg swelling.   Gastrointestinal: Positive for diarrhea and nausea. Negative for abdominal pain and vomiting.   Genitourinary: Negative for dysuria, flank pain, frequency and hematuria.   Musculoskeletal: Negative for back pain and myalgias.   Neurological: Positive for weakness and  light-headedness. Negative for syncope and headaches.   Psychiatric/Behavioral: Negative for confusion.     Objective:     Vital Signs (Most Recent):  Temp: 97.5 °F (36.4 °C) (05/29/19 0609)  Pulse: 85 (05/29/19 0609)  Resp: 16 (05/29/19 0609)  BP: 122/79 (05/29/19 0609)  SpO2: 96 % (05/29/19 0341) Vital Signs (24h Range):  Temp:  [97.5 °F (36.4 °C)-97.9 °F (36.6 °C)] 97.5 °F (36.4 °C)  Pulse:  [] 85  Resp:  [11-26] 16  SpO2:  [96 %-100 %] 96 %  BP: (122-179)/() 122/79     Weight: 44 kg (97 lb)  Body mass index is 17.74 kg/m².    Physical Exam   Constitutional: She is oriented to person, place, and time. She appears well-developed and well-nourished.   HENT:   Head: Normocephalic and atraumatic.   Eyes: Pupils are equal, round, and reactive to light. Conjunctivae are normal.   Neck: Normal range of motion. No JVD present.   Cardiovascular: Normal rate, regular rhythm, normal heart sounds and intact distal pulses.   Pulmonary/Chest: Effort normal. No respiratory distress. She has no wheezes.   Abdominal: Soft. Bowel sounds are normal. She exhibits no distension. There is no tenderness. There is no guarding.   Musculoskeletal: Normal range of motion. She exhibits no edema or tenderness.   Neurological: She is alert and oriented to person, place, and time.   Skin: Skin is warm and dry. Capillary refill takes less than 2 seconds.   Psychiatric: She has a normal mood and affect. Her behavior is normal.         CRANIAL NERVES     CN III, IV, VI   Pupils are equal, round, and reactive to light.       Significant Labs:   BMP:   Recent Labs   Lab 05/29/19  0504   *   *   K 3.9      CO2 22*   BUN 7   CREATININE 0.8   CALCIUM 9.2     CBC:   Recent Labs   Lab 05/29/19 0037   WBC 7.45   HGB 13.4   HCT 38.5        Troponin:   Recent Labs   Lab 05/29/19  0037   TROPONINI <0.006     Urine Studies:   Recent Labs   Lab 05/29/19  0213   COLORU Yellow   APPEARANCEUA Clear   PHUR 5.0   SPECGRAV  1.010   PROTEINUA Negative   GLUCUA Negative   KETONESU Negative   BILIRUBINUA Negative   OCCULTUA 2+*   NITRITE Negative   UROBILINOGEN Negative   LEUKOCYTESUR Negative   RBCUA 5*       Significant Imaging: I have reviewed all pertinent imaging results/findings within the past 24 hours.

## 2019-05-29 NOTE — H&P
"[facility] Medicine  History & Physical    Patient Name: Sarahy Ma  MRN: [id number]  Admission Date: 2019  Attending Physician: Mark Maher DO   Primary Care Provider: Gabrielle Bell MD         Patient information was obtained from patient, past medical records and ER records.     Subjective:     Principal Problem:Hyponatremia    Chief Complaint:   Chief Complaint   Patient presents with    Weakness     Patient reports generalized weakness. Endorses "prepping for a colonoscopy." States drank miralax to prepare for procedure. Also reports being hypertensive at home. Denies chest pain, abdominal pain or shortness of breath.         HPI: Sarahy Ma is a 59-year-old female with a past medical history of hypothyroidism secondary to radiation treatment for tongue cancer (dx in ; cisplatin/cetuximab/XRT completed in ), iron deficiency anemia and ulcerative colitis presented to the ED complaining of generalized weakness and malaise after beginning a bowel prep for a colonoscopy at approximately 4:00 p.m. Patient denies chest pain, abdominal pain, SOB and fever/chills. She reports lightheadedness, nausea, and head pressure, but denies sensation of the room spinning. She estimates approximately 8 episodes of nonbloody diarrhea since beginning bowel prep. No vomiting.   Labs in ED remarkable for Na 125, K 3.0. She received 40 meq potassium and 1 L normal saline in ED. Patient hypertensive on arrival. Patient admitted  to CDU for further care.     Past Medical History:   Diagnosis Date    Postablative hypothyroidism     secondary to the radiation treatment for tongue cancer - treated by oncologist    Tongue cancer     Ulcerative colitis     Treated by Dr. Tobias       Past Surgical History:   Procedure Laterality Date     SECTION      CHOLECYSTECTOMY  2016    removed    CHOLECYSTECTOMY  2016    CHOLECYSTECTOMY-LAPAROSCOPIC N/A 2016    " Performed by Chantel Park MD at [facility] OR    COLONOSCOPY      Dr. Tobias    feeding tube      groin surgery  08/27/2017    swollen lump node    PORTACATH PLACEMENT      TONSILLECTOMY      TUBAL LIGATION         Review of patient's allergies indicates:  No Known Allergies    No current facility-administered medications on file prior to encounter.      Current Outpatient Medications on File Prior to Encounter   Medication Sig    LIALDA 1.2 gram TbEC Take 2.4 g by mouth every morning.    lisinopril (PRINIVIL,ZESTRIL) 2.5 MG tablet Take 1 tablet (2.5 mg total) by mouth once daily.     Family History     Problem Relation (Age of Onset)    Diabetes Father    Hypertension Father, Mother    Thyroid disease Sister, Brother, Sister, Brother        Tobacco Use    Smoking status: Former Smoker    Smokeless tobacco: Never Used   Substance and Sexual Activity    Alcohol use: Yes     Comment: occasionally    Drug use: No    Sexual activity: Not on file     Review of Systems   Constitutional: Negative for chills, diaphoresis and fever.   Eyes: Negative for photophobia.   Respiratory: Negative for cough, chest tightness, shortness of breath and wheezing.    Cardiovascular: Negative for chest pain, palpitations and leg swelling.   Gastrointestinal: Positive for diarrhea and nausea. Negative for abdominal pain and vomiting.   Genitourinary: Negative for dysuria, flank pain, frequency and hematuria.   Musculoskeletal: Negative for back pain and myalgias.   Neurological: Positive for weakness and light-headedness. Negative for syncope and headaches.   Psychiatric/Behavioral: Negative for confusion.     Objective:     Vital Signs (Most Recent):  Temp: 97.5 °F (36.4 °C) (05/29/19 0609)  Pulse: 85 (05/29/19 0609)  Resp: 16 (05/29/19 0609)  BP: 122/79 (05/29/19 0609)  SpO2: 96 % (05/29/19 0341) Vital Signs (24h Range):  Temp:  [97.5 °F (36.4 °C)-97.9 °F (36.6 °C)] 97.5 °F (36.4 °C)  Pulse:  [] 85  Resp:  [11-26]  16  SpO2:  [96 %-100 %] 96 %  BP: (122-179)/() 122/79     Weight: 44 kg (97 lb)  Body mass index is 17.74 kg/m².    Physical Exam   Constitutional: She is oriented to person, place, and time. She appears well-developed and well-nourished.   HENT:   Head: Normocephalic and atraumatic.   Eyes: Pupils are equal, round, and reactive to light. Conjunctivae are normal.   Neck: Normal range of motion. No JVD present.   Cardiovascular: Normal rate, regular rhythm, normal heart sounds and intact distal pulses.   Pulmonary/Chest: Effort normal. No respiratory distress. She has no wheezes.   Abdominal: Soft. Bowel sounds are normal. She exhibits no distension. There is no tenderness. There is no guarding.   Musculoskeletal: Normal range of motion. She exhibits no edema or tenderness.   Neurological: She is alert and oriented to person, place, and time.   Skin: Skin is warm and dry. Capillary refill takes less than 2 seconds.   Psychiatric: She has a normal mood and affect. Her behavior is normal.         CRANIAL NERVES     CN III, IV, VI   Pupils are equal, round, and reactive to light.       Significant Labs:   BMP:   Recent Labs   Lab 05/29/19  0504   *   *   K 3.9      CO2 22*   BUN 7   CREATININE 0.8   CALCIUM 9.2     CBC:   Recent Labs   Lab 05/29/19  0037   WBC 7.45   HGB 13.4   HCT 38.5        Troponin:   Recent Labs   Lab 05/29/19  0037   TROPONINI <0.006     Urine Studies:   Recent Labs   Lab 05/29/19  0213   COLORU Yellow   APPEARANCEUA Clear   PHUR 5.0   SPECGRAV 1.010   PROTEINUA Negative   GLUCUA Negative   KETONESU Negative   BILIRUBINUA Negative   OCCULTUA 2+*   NITRITE Negative   UROBILINOGEN Negative   LEUKOCYTESUR Negative   RBCUA 5*       Significant Imaging: I have reviewed all pertinent imaging results/findings within the past 24 hours.    Assessment/Plan:     * Hyponatremia  Continue IVF   Repeat am BMP       Ulcerative colitis  Chronic, stable       Postablative  hypothyroidism  Continue synthroid         VTE Risk Mitigation (From admission, onward)        Ordered     IP VTE LOW RISK PATIENT  Once      05/29/19 0301     Place sequential compression device  Until discontinued      05/29/19 0301             Jagruti Harper NP  Department of Hospital Medicine   [facility]

## 2019-05-29 NOTE — PLAN OF CARE
Discharge order noted, No HH or DME noted. Case Management Discharge Information handout given.    Discharge rounds on patient. Discussed followup appointments, blue discharge folder, discharge nurse will go over home medications and reasons for medications and final discharge instructions. All patient/caregiver questions answered. Patient verbalized understanding.         05/29/19 1029   Final Note   Assessment Type Final Discharge Note   Anticipated Discharge Disposition Home   What phone number can be called within the next 1-3 days to see how you are doing after discharge? [phone]   Hospital Follow Up  Appt(s) scheduled? Yes   Discharge plans and expectations educations in teach back method with documentation complete? Yes   Right Care Referral Info   Post Acute Recommendation No Care   Norman Carr RN-BC  Transitional Navigator  [phone]

## 2019-05-29 NOTE — NURSING
Discussed discharge instructions with pt and . Educated on medications when and how to take and side effects to watch for. Pt and  verbalized understanding. PIV and tele monitor removed. Waiting for transport.

## 2019-05-29 NOTE — ED NOTES
States she was prepping for colonoscopy. States she feels so bad. Complaining of nausea and weakness. States blood pressure is high. Complaining of pressure in ears. Also complaining of chills.

## 2019-05-29 NOTE — PLAN OF CARE
Problem: Adult Inpatient Plan of Care  Goal: Plan of Care Review  Outcome: Ongoing (interventions implemented as appropriate)  Pt on RA with documented sats.100. Will continue to monitor.

## 2019-05-29 NOTE — ED NOTES
Patient identifiers for Sarahy Ma checked and correct.  LOC: The patient is awake, alert and aware of environment with an appropriate affect, the patient is oriented x 3 and speaking appropriately.  APPEARANCE: Patient uncomfortable  and in no acute distress.  SKIN: The skin is warm and dry, patient has normal skin turgor and moist mucus membranes, skin intact, no breakdown or brusing noted.  MUSKULOSKELETAL: Patient moving all extremities well, no obvious swelling or deformities noted.  RESPIRATORY: Airway is open and patent, respirations are spontaneous, patient has a normal effort and rate.  CARDIAC: Patient has a normal rate and rhythm, no periphreal edema noted.  NEUROLOGIC: PERRL, facial expression is symmetrical, bilateral hand grasp equal and even, normal sensation in all extremities when touched with a finger.

## 2019-05-29 NOTE — NURSING
Patient to CDU with  at bedside. Assessment performed, vitals obtained. No present c/o pain or discomfort. Bed lowered with call light within reach.

## 2019-05-29 NOTE — ED PROVIDER NOTES
"Encounter Date: 2019       History     Chief Complaint   Patient presents with    Weakness     Patient reports generalized weakness. Endorses "prepping for a colonoscopy." States drank miralax to prepare for procedure. Also reports being hypertensive at home. Denies chest pain, abdominal pain or shortness of breath.      The patient is a 59-year-old female with a past medical history of hypothyroidism secondary to radiation treatment for tongue cancer (dx in ; cisplatin/cetuximab/XRT completed in ), iron deficiency anemia (last iron infusion in ), ulcerative colitis (treated by Dr. Tobias), and sinus problems (tympanostomy in 2019) who presents to the ED complaining of generalized weakness and malaise after beginning a bowel prep for a colonoscopy at approximately 4:00 p.m. this afternoon.  The patient is afebrile, non-toxic appearing, and hemodynamically stable in triage.  Denies fever, chills, chest pain, shortness of breath, abdominal pain, and urinary symptoms.  Endorses a feeling of lightheadedness, nausea, and head pressure, but denies sensation of the room spinning.  Remote history of vertigo, but states that this feels different.  She reports that she has had a CT scan of her head several years ago, but it was not obtained within the [facility] system.  She estimates approximately 8 episodes of nonbloody diarrhea since beginning bowel prep.  No treatment attempted prior to arrival.    The history is provided by the patient and the spouse.     Review of patient's allergies indicates:  No Known Allergies  Past Medical History:   Diagnosis Date    Postablative hypothyroidism     secondary to the radiation treatment for tongue cancer - treated by oncologist    Tongue cancer     Ulcerative colitis     Treated by Dr. Tobias     Past Surgical History:   Procedure Laterality Date     SECTION      CHOLECYSTECTOMY  2016    removed    CHOLECYSTECTOMY  2016    " CHOLECYSTECTOMY-LAPAROSCOPIC N/A 7/14/2016    [facility]    COLONOSCOPY      Dr. Tobias    feeding tube      groin surgery  08/27/2017    swollen lump node    PORTACATH PLACEMENT      TONSILLECTOMY      TUBAL LIGATION       Family History   Problem Relation Age of Onset    Hypertension Father     Diabetes Father     Hypertension Mother     Thyroid disease Sister     Thyroid disease Brother     Thyroid disease Sister     Thyroid disease Brother      Social History     Tobacco Use    Smoking status: Former Smoker    Smokeless tobacco: Never Used   Substance Use Topics    Alcohol use: Yes     Comment: occasionally    Drug use: No     Review of Systems   Constitutional: Negative for chills and fever.   HENT: Negative for sore throat.    Eyes: Negative for visual disturbance.   Respiratory: Negative for shortness of breath.    Cardiovascular: Negative for chest pain.   Gastrointestinal: Positive for diarrhea and nausea. Negative for abdominal pain and vomiting.   Genitourinary: Negative for dysuria.   Musculoskeletal: Negative for back pain.   Skin: Negative for rash.   Neurological: Positive for light-headedness and headaches. Negative for weakness and numbness.   Hematological: Does not bruise/bleed easily.       Physical Exam     Initial Vitals [05/28/19 2350]   BP Pulse Resp Temp SpO2   (!) 179/95 110 20 97.5 °F (36.4 °C) 100 %      MAP       --         Physical Exam    Nursing note and vitals reviewed.  Constitutional: She appears well-developed and well-nourished.  Non-toxic appearance. No distress.   The patient is alert, oriented, and speaking in complete sentences.  No apparent distress.   HENT:   Head: Normocephalic and atraumatic.   Right Ear: Hearing and external ear normal.   Left Ear: Hearing and external ear normal.   Nose: Nose abnormal.   Mouth/Throat: Oropharynx is clear and moist.   Tympanostomy tubes in place   Eyes: Conjunctivae are normal. Pupils are  equal, round, and reactive to light. Right eye exhibits nystagmus. Right eye exhibits normal extraocular motion. Left eye exhibits nystagmus. Left eye exhibits normal extraocular motion. Right pupil is round and reactive. Left pupil is round and reactive. Pupils are equal.   A few subtle beats of horizontal nystagmus noted on exam   Neck: Full passive range of motion without pain. Neck supple. No neck rigidity.   No meningismus   Cardiovascular: Normal rate, normal heart sounds and normal pulses. Exam reveals no gallop and no friction rub.    No murmur heard.  Pulses:       Radial pulses are 2+ on the right side, and 2+ on the left side.        Dorsalis pedis pulses are 2+ on the right side, and 2+ on the left side.   Pulmonary/Chest: Effort normal and breath sounds normal. No respiratory distress. She has no decreased breath sounds. She has no wheezes. She has no rhonchi. She has no rales.   Abdominal: Soft. Bowel sounds are normal. She exhibits no distension. There is no tenderness. There is no rigidity, no rebound and no guarding.   Musculoskeletal: Normal range of motion.   Neurological: She is alert and oriented to person, place, and time. She has normal strength. No cranial nerve deficit or sensory deficit. She exhibits normal muscle tone. She displays no seizure activity. Gait normal. GCS eye subscore is 4. GCS verbal subscore is 5. GCS motor subscore is 6.   No sensory deficits.  Muscular strength is normal and equal bilaterally. No pronator drift.   Skin: Skin is warm, dry and intact. Capillary refill takes less than 2 seconds. No rash noted.   Psychiatric: She has a normal mood and affect. Her speech is normal and behavior is normal. Judgment and thought content normal. Cognition and memory are normal.         ED Course   Critical Care  Date/Time: 5/29/2019 2:26 AM  Performed by: Garett Tyson MD  Authorized by: Garett Tyson MD   Direct patient critical care time: 15 minutes  Additional  history critical care time: 15 minutes  Ordering / reviewing critical care time: 15 minutes  Documentation critical care time: 15 minutes  Consulting other physicians critical care time: 15 minutes  Consult with family critical care time: 10 minutes  Total critical care time (exclusive of procedural time) : 85 minutes  Critical care time was exclusive of separately billable procedures and treating other patients.  Critical care was necessary to treat or prevent imminent or life-threatening deterioration of the following conditions: metabolic crisis.  Critical care was time spent personally by me on the following activities: evaluation of patient's response to treatment, obtaining history from patient or surrogate, ordering and review of laboratory studies, pulse oximetry, re-evaluation of patient's condition, ordering and performing treatments and interventions, examination of patient and interpretation of cardiac output measurements.        Labs Reviewed   CBC W/ AUTO DIFFERENTIAL - Abnormal; Notable for the following components:       Result Value    MPV 8.8 (*)     All other components within normal limits   COMPREHENSIVE METABOLIC PANEL - Abnormal; Notable for the following components:    Sodium 125 (*)     Potassium 3.0 (*)     CO2 18 (*)     Glucose 161 (*)     Total Bilirubin 1.4 (*)     All other components within normal limits   TROPONIN I   LIPASE   LIPASE   URINALYSIS, REFLEX TO URINE CULTURE     EKG Readings: (Independently Interpreted)   Initial Reading: No STEMI. Rhythm: Sinus Tachycardia. Heart Rate: 103. Ectopy: No Ectopy. Conduction: Normal. ST Segments: Normal ST Segments.       Imaging Results    None          Medical Decision Making:   History:   Old Medical Records: I decided to obtain old medical records.  Differential Diagnosis:   Electrolyte disturbance  Orthostasis  Urinary tract infection  Vertigo  ACS  MI  CVA  Intracranial abnormality  Malignancy  Sepsis  Clinical Tests:   Lab Tests:  Ordered and Reviewed  Radiological Study: Ordered and Reviewed  Medical Tests: Ordered and Reviewed  ED Management:  ECG does not demonstrate evidence of acute ischemia or dysrhythmia.    No evidence of orthostasis when assessing orthostatic vital signs.    I independently reviewed and interpreted labs which are notable for hyponatremia with a sodium of 125, hypokalemia with a potassium of 3.0, and hypocarbia with a CO2 of 18.  There is no leukocytosis.  All other labs are essentially within normal limits.    Patient reports improvement of symptoms following intravenous fluid resuscitation and anti-emetic administration.    Based on history and physical exam, as well as diagnostic results, I considered but do not suspect orthostasis, ACS, MI, CVA, intracranial abnormality, or sepsis.  Clinical presentation suggests hyponatremia and mild hypokalemia which may be due to hypovolemia caused by bowel prep.  Case discussed with hospital medicine.  Initial orders have been placed.  Case discussed with supervising physician who agrees with plan.              Attending Attestation:     Physician Attestation Statement for NP/PA:   I have conducted a face to face encounter with this patient in addition to the NP/PA, due to Medical Complexity    Other NP/PA Attestation Additions:    History of Present Illness: Agree; 58 y/o F presents to the ED c/o generalized weakness she attributes to bowel prep for colonoscopy.   Physical Exam: Agree   Medical Decision Making: Agree; Patient with hyponatremia and hypokalemia. Most recent labs are 9-10 months ago, so difficult to say if this is acute or chronic, but give the CC, patient merits observation for repletion of her sodium and potassium. Px comfortable with plan at this time.                    Clinical Impression:       ICD-10-CM ICD-9-CM   1. Hyponatremia E87.1 276.1   2. Weakness R53.1 780.79   3. Hypokalemia E87.6 276.8                                Judy Joseph,  NP  05/29/19 0232       Garett Tyson MD  05/29/19 0325

## 2019-05-29 NOTE — DISCHARGE SUMMARY
[facility] Medicine  Discharge Summary      Patient Name: Sarahy Ma  MRN: [id number]  Admission Date: 5/29/2019  Hospital Length of Stay: 0 days  Discharge Date and Time:  05/29/2019 9:42 AM  Attending Physician: Mark Maher DO   Discharging Provider: Chandler Willett NP  Primary Care Provider: Gabrielle Bell MD      HPI:   Sarahy Ma is a 59-year-old female with a past medical history of hypothyroidism secondary to radiation treatment for tongue cancer (dx in 2009; cisplatin/cetuximab/XRT completed in 2009), iron deficiency anemia and ulcerative colitis presented to the ED complaining of generalized weakness and malaise after beginning a bowel prep for a colonoscopy at approximately 4:00 p.m. Patient denies chest pain, abdominal pain, SOB and fever/chills. She reports lightheadedness, nausea, and head pressure, but denies sensation of the room spinning. She estimates approximately 8 episodes of nonbloody diarrhea since beginning bowel prep. No vomiting.   Labs in ED remarkable for Na 125, K 3.0. She received 40 meq potassium and 1 L normal saline in ED. Patient hypertensive on arrival. Patient admitted  to CDU for further care.     * No surgery found *      Hospital Course:   The patient was admitted to the CDU for observation. Na 125>133 potassium 3.0>3.9. IV fluids were given, electrolytes were replaced. Will d/c home today as the patient states she feels much better. Will need outpatient follow up with PCP in 1 week.     Consults:     * Hyponatremia-resolved as of 5/29/2019  -Gave gentle IVF hydration  -Repeated BMP   -Na---125>133; K--3.0>3.9  -follow up outpatient with PCP      Ulcerative colitis  Chronic, stable       Postablative hypothyroidism  Continue synthroid         Final Active Diagnoses:    Diagnosis Date Noted POA    Ulcerative colitis [K51.90]  Yes    Postablative hypothyroidism [E89.0]  Yes      Problems Resolved During this Admission:     Diagnosis Date Noted Date Resolved POA    PRINCIPAL PROBLEM:  Hyponatremia [E87.1] 05/29/2019 05/29/2019 Yes       Discharged Condition: good    Disposition: Home or Self Care    Follow Up:  Follow-up Information     Gabrielle Bell MD On 6/5/2019.    Specialty:  Family Medicine  Why:  time: 10:20am hospital follow up  Contact information:  [address]  [address]  [place] [address]  [phone]                 Patient Instructions:      Diet Adult Regular     Notify your health care provider if you experience any of the following:  temperature >100.4     Notify your health care provider if you experience any of the following:  persistent nausea and vomiting or diarrhea     Notify your health care provider if you experience any of the following:  severe uncontrolled pain     Notify your health care provider if you experience any of the following:  difficulty breathing or increased cough     Notify your health care provider if you experience any of the following:  severe persistent headache     Notify your health care provider if you experience any of the following:  worsening rash     Notify your health care provider if you experience any of the following:  persistent dizziness, light-headedness, or visual disturbances     Notify your health care provider if you experience any of the following:  increased confusion or weakness     Activity as tolerated       Significant Diagnostic Studies: Labs:   BMP:   Recent Labs   Lab 05/29/19  0037 05/29/19  0504   * 122*   * 133*   K 3.0* 3.9   CL 95 103   CO2 18* 22*   BUN 8 7   CREATININE 0.8 0.8   CALCIUM 9.5 9.2   , CMP   Recent Labs   Lab 05/29/19  0037 05/29/19  0504   * 133*   K 3.0* 3.9   CL 95 103   CO2 18* 22*   * 122*   BUN 8 7   CREATININE 0.8 0.8   CALCIUM 9.5 9.2   PROT 6.9  --    ALBUMIN 4.4  --    BILITOT 1.4*  --    ALKPHOS 78  --    AST 21  --    ALT 13  --    ANIONGAP 12 8   ESTGFRAFRICA >60 >60   EGFRNONAA >60 >60    and  Troponin   Recent Labs   Lab 05/29/19  0037   TROPONINI <0.006       Pending Diagnostic Studies:     None         Medications:  Reconciled Home Medications:      Medication List      CHANGE how you take these medications    levothyroxine 75 MCG tablet  Commonly known as:  SYNTHROID  Take 1 tablet (75 mcg total) by mouth before breakfast.  What changed:    · how much to take  · when to take this        CONTINUE taking these medications    LIALDA 1.2 gram Tbec  Generic drug:  mesalamine  Take 2.4 g by mouth every morning.     lisinopril 2.5 MG tablet  Commonly known as:  PRINIVIL,ZESTRIL  Take 1 tablet (2.5 mg total) by mouth once daily.            Indwelling Lines/Drains at time of discharge:   Lines/Drains/Airways          None          Time spent on the discharge of patient: 35 minutes  Patient was seen and examined on the date of discharge and determined to be suitable for discharge.         Chandler Willett NP  Department of Hospital Medicine  [facility]

## 2019-05-29 NOTE — HOSPITAL COURSE
The patient was admitted to the CDU for observation. Na 125>133 potassium 3.0>3.9. IV fluids were given, electrolytes were replaced. Will d/c home today as the patient states she feels much better. Will need outpatient follow up with PCP in 1 week.

## 2019-06-05 ENCOUNTER — OFFICE VISIT (OUTPATIENT)
Dept: FAMILY MEDICINE | Facility: CLINIC | Age: 59
End: 2019-06-05
Payer: COMMERCIAL

## 2019-06-05 VITALS
TEMPERATURE: 98 F | WEIGHT: 115.5 LBS | OXYGEN SATURATION: 98 % | RESPIRATION RATE: 16 BRPM | SYSTOLIC BLOOD PRESSURE: 110 MMHG | DIASTOLIC BLOOD PRESSURE: 80 MMHG | HEART RATE: 74 BPM | BODY MASS INDEX: 21.25 KG/M2 | HEIGHT: 62 IN

## 2019-06-05 DIAGNOSIS — E87.1 HYPONATREMIA: ICD-10-CM

## 2019-06-05 DIAGNOSIS — I10 ESSENTIAL HYPERTENSION: Primary | ICD-10-CM

## 2019-06-05 PROCEDURE — 3079F PR MOST RECENT DIASTOLIC BLOOD PRESSURE 80-89 MM HG: ICD-10-PCS | Mod: CPTII,S$GLB,, | Performed by: FAMILY MEDICINE

## 2019-06-05 PROCEDURE — 3074F PR MOST RECENT SYSTOLIC BLOOD PRESSURE < 130 MM HG: ICD-10-PCS | Mod: CPTII,S$GLB,, | Performed by: FAMILY MEDICINE

## 2019-06-05 PROCEDURE — 99214 PR OFFICE/OUTPT VISIT, EST, LEVL IV, 30-39 MIN: ICD-10-PCS | Mod: S$GLB,,, | Performed by: FAMILY MEDICINE

## 2019-06-05 PROCEDURE — 99999 PR PBB SHADOW E&M-EST. PATIENT-LVL III: ICD-10-PCS | Mod: PBBFAC,,, | Performed by: FAMILY MEDICINE

## 2019-06-05 PROCEDURE — 3008F BODY MASS INDEX DOCD: CPT | Mod: CPTII,S$GLB,, | Performed by: FAMILY MEDICINE

## 2019-06-05 PROCEDURE — 3079F DIAST BP 80-89 MM HG: CPT | Mod: CPTII,S$GLB,, | Performed by: FAMILY MEDICINE

## 2019-06-05 PROCEDURE — 99999 PR PBB SHADOW E&M-EST. PATIENT-LVL III: CPT | Mod: PBBFAC,,, | Performed by: FAMILY MEDICINE

## 2019-06-05 PROCEDURE — 3074F SYST BP LT 130 MM HG: CPT | Mod: CPTII,S$GLB,, | Performed by: FAMILY MEDICINE

## 2019-06-05 PROCEDURE — 3008F PR BODY MASS INDEX (BMI) DOCUMENTED: ICD-10-PCS | Mod: CPTII,S$GLB,, | Performed by: FAMILY MEDICINE

## 2019-06-05 PROCEDURE — 99214 OFFICE O/P EST MOD 30 MIN: CPT | Mod: S$GLB,,, | Performed by: FAMILY MEDICINE

## 2019-06-05 RX ORDER — MOMETASONE FUROATE 50 UG/1
2 SPRAY, METERED NASAL DAILY
COMMUNITY
End: 2019-06-05

## 2019-06-05 RX ORDER — LISINOPRIL 5 MG/1
5 TABLET ORAL DAILY
COMMUNITY
End: 2019-09-26 | Stop reason: SDUPTHER

## 2019-06-05 NOTE — PROGRESS NOTES
HPI:  Sarahy Ma is a 59 y.o. year old female that  presents for f/u from a hospitalization. She was doing a prep for a colonoscopy and became ill.She as not been able to tolerate the Gatorade that was prescribed to increase her Sodium level. She has lost weight but is not trying . She feel foggy in her head , especially if she gets up too fast.  Chief Complaint   Patient presents with    Hospital Follow Up     F/U from dehydration       Past Medical History:   Diagnosis Date    Postablative hypothyroidism     secondary to the radiation treatment for tongue cancer - treated by oncologist    Tongue cancer     Ulcerative colitis     Treated by Dr. Tobias     Social History     Socioeconomic History    Marital status:      Spouse name: Not on file    Number of children: Not on file    Years of education: Not on file    Highest education level: Not on file   Occupational History    Not on file   Social Needs    Financial resource strain: Not on file    Food insecurity:     Worry: Not on file     Inability: Not on file    Transportation needs:     Medical: Not on file     Non-medical: Not on file   Tobacco Use    Smoking status: Former Smoker    Smokeless tobacco: Never Used   Substance and Sexual Activity    Alcohol use: Yes     Comment: occasionally    Drug use: No    Sexual activity: Not on file   Lifestyle    Physical activity:     Days per week: Not on file     Minutes per session: Not on file    Stress: Not on file   Relationships    Social connections:     Talks on phone: Not on file     Gets together: Not on file     Attends [employer] service: Not on file     Active member of club or organization: Not on file     Attends meetings of clubs or organizations: Not on file     Relationship status: Not on file   Other Topics Concern    Not on file   Social History Narrative    Not on file     Past Surgical History:   Procedure Laterality Date     SECTION      CHOLECYSTECTOMY   "07/14/2016    removed    CHOLECYSTECTOMY  07/2016    CHOLECYSTECTOMY-LAPAROSCOPIC N/A 7/14/2016    Performed by Chantel Park MD at [facility] OR    COLONOSCOPY      Dr. Tobias    feeding tube      groin surgery  08/27/2017    swollen lump node    PORTACATH PLACEMENT      TONSILLECTOMY      TUBAL LIGATION       Family History   Problem Relation Age of Onset    Hypertension Father     Diabetes Father     Hypertension Mother     Thyroid disease Sister     Thyroid disease Brother     Thyroid disease Sister     Thyroid disease Brother            Review of Systems  General ROS: negative for chills, fever or weight loss  ENT ROS: negative for epistaxis, sore throat or rhinorrhea  Respiratory ROS: no cough, shortness of breath, or wheezing  Cardiovascular ROS: no chest pain or dyspnea on exertion  Gastrointestinal ROS: no abdominal pain, change in bowel habits, or black/ bloody stools    Physical Exam:  /80 (BP Location: Right arm, Patient Position: Sitting, BP Method: Large (Manual))   Pulse 74   Temp 98 °F (36.7 °C) (Oral)   Resp 16   Ht 5' 2" (1.575 m)   Wt 52.4 kg (115 lb 8.3 oz)   SpO2 98%   BMI 21.13 kg/m²   General appearance: alert, cooperative, no distress  Constitutional:Oriented to person, place, and time.appears well-developed and well-nourished.  HEENT: Normocephalic, atraumatic, neck symmetrical, no nasal discharge, TM- clear bilaterally  Lungs: clear to auscultation bilaterally, no dullness to percussion bilaterally  Heart: regular rate and rhythm without rub; no displacement of the PMI , S1&S2 present  Abdomen: soft, non-tender; bowel sounds normoactive; no organomegaly  Physical Exam      LABS:    Complete Blood Count  Lab Results   Component Value Date    RBC 4.38 05/29/2019    HGB 13.4 05/29/2019    HCT 38.5 05/29/2019    MCV 88 05/29/2019    MCH 30.6 05/29/2019    MCHC 34.8 05/29/2019    RDW 11.8 05/29/2019     05/29/2019    MPV 8.8 (L) 05/29/2019    GRAN 5.4 05/29/2019 "    GRAN 71.7 05/29/2019    LYMPH 1.5 05/29/2019    LYMPH 19.5 05/29/2019    MONO 0.6 05/29/2019    MONO 7.7 05/29/2019    EOS 0.1 05/29/2019    BASO 0.02 05/29/2019    EOSINOPHIL 0.7 05/29/2019    BASOPHIL 0.3 05/29/2019    DIFFMETHOD Automated 05/29/2019       Comprehensive Metabolic Panel  Lab Results   Component Value Date     (H) 05/29/2019    BUN 7 05/29/2019    CREATININE 0.8 05/29/2019     (L) 05/29/2019    K 3.9 05/29/2019     05/29/2019    PROT 6.9 05/29/2019    ALBUMIN 4.4 05/29/2019    BILITOT 1.4 (H) 05/29/2019    AST 21 05/29/2019    ALKPHOS 78 05/29/2019    CO2 22 (L) 05/29/2019    ALT 13 05/29/2019    ANIONGAP 8 05/29/2019    EGFRNONAA >60 05/29/2019    ESTGFRAFRICA >60 05/29/2019       LIPID  Lab Results   Component Value Date    CHOL 208 (H) 11/02/2018    HDL 72 11/02/2018         TSH  Lab Results   Component Value Date    TSH 1.420 08/14/2018       Current Outpatient Medications   Medication Sig Dispense Refill    levothyroxine (SYNTHROID) 75 MCG tablet Take 1 tablet (75 mcg total) by mouth before breakfast. 30 tablet 11    LIALDA 1.2 gram TbEC Take 2.4 g by mouth every morning.  4    lisinopril (PRINIVIL,ZESTRIL) 5 MG tablet Take 5 mg by mouth once daily.       No current facility-administered medications for this visit.        Assessment:    ICD-10-CM ICD-9-CM    1. Essential hypertension I10 401.9    2. Hyponatremia E87.1 276.1          Plan:  Encouraged pt to try eating pickles and monitor water intake.   Follow up in about 1 month (around 7/5/2019).          Gabrielle Bell MD

## 2019-09-26 ENCOUNTER — IMMUNIZATION (OUTPATIENT)
Dept: PHARMACY | Facility: CLINIC | Age: 59
End: 2019-09-26
Payer: COMMERCIAL

## 2019-09-26 RX ORDER — LISINOPRIL 5 MG/1
5 TABLET ORAL DAILY
Qty: 30 TABLET | Refills: 0 | Status: SHIPPED | OUTPATIENT
Start: 2019-09-26 | End: 2019-10-24 | Stop reason: SDUPTHER

## 2019-09-26 NOTE — TELEPHONE ENCOUNTER
----- Message from Hoa Pham sent at 9/26/2019  9:54 AM CDT -----  Contact: self / [phone]  Patient is requesting a refill on the below. Please advise    Was a patient of Dr. Bell            lisinopril (PRINIVIL,ZESTRIL) 5 MG tablet    [facility]

## 2019-10-24 RX ORDER — LISINOPRIL 5 MG/1
5 TABLET ORAL DAILY
Qty: 30 TABLET | Refills: 0 | Status: SHIPPED | OUTPATIENT
Start: 2019-10-24 | End: 2019-11-25 | Stop reason: SDUPTHER

## 2019-10-24 NOTE — TELEPHONE ENCOUNTER
----- Message from Sara Pinedo sent at 10/24/2019  3:01 PM CDT -----  No. [phone]   Patient has an appointment in November.  She needs Lisinopril 5mg filled. [facility]

## 2019-11-25 ENCOUNTER — TELEPHONE (OUTPATIENT)
Dept: FAMILY MEDICINE | Facility: CLINIC | Age: 59
End: 2019-11-25

## 2019-11-25 RX ORDER — LISINOPRIL 5 MG/1
5 TABLET ORAL DAILY
Qty: 30 TABLET | Refills: 0 | Status: SHIPPED | OUTPATIENT
Start: 2019-11-25 | End: 2019-12-26 | Stop reason: SDUPTHER

## 2019-11-25 NOTE — TELEPHONE ENCOUNTER
----- Message from Arron Murphy, Patient Care Assistant sent at 11/25/2019  8:47 AM CST -----  Contact: Self  Pt needs a refill of lisinopril (PRINIVIL,ZESTRIL) 5 MG tablet    Pt uses [facility]    Pt needs meds urgently Please advise pt can at [phone]

## 2019-11-25 NOTE — TELEPHONE ENCOUNTER
Spoke with pt regarding scheduling est care appt. New pt appt scheduled with Dr. Dixon. Informed pt that rx was sent to pharmacy.

## 2019-11-25 NOTE — TELEPHONE ENCOUNTER
----- Message from NAKITA Lepe sent at 11/25/2019  9:00 AM CST -----  Needs to establish care with myself or new PCP for continued refills.

## 2019-12-09 ENCOUNTER — OFFICE VISIT (OUTPATIENT)
Dept: FAMILY MEDICINE | Facility: CLINIC | Age: 59
End: 2019-12-09
Payer: COMMERCIAL

## 2019-12-09 VITALS
HEART RATE: 76 BPM | TEMPERATURE: 98 F | HEIGHT: 62 IN | DIASTOLIC BLOOD PRESSURE: 70 MMHG | WEIGHT: 115.88 LBS | OXYGEN SATURATION: 97 % | SYSTOLIC BLOOD PRESSURE: 110 MMHG | BODY MASS INDEX: 21.32 KG/M2

## 2019-12-09 DIAGNOSIS — Z85.810 HISTORY OF TONGUE CANCER: ICD-10-CM

## 2019-12-09 DIAGNOSIS — M25.50 ARTHRALGIA, UNSPECIFIED JOINT: ICD-10-CM

## 2019-12-09 DIAGNOSIS — N30.00 ACUTE CYSTITIS WITHOUT HEMATURIA: ICD-10-CM

## 2019-12-09 DIAGNOSIS — K51.80 OTHER ULCERATIVE COLITIS WITHOUT COMPLICATION: ICD-10-CM

## 2019-12-09 DIAGNOSIS — M51.36 DEGENERATIVE DISC DISEASE, LUMBAR: ICD-10-CM

## 2019-12-09 DIAGNOSIS — E89.0 POSTABLATIVE HYPOTHYROIDISM: Primary | ICD-10-CM

## 2019-12-09 PROBLEM — M51.369 DEGENERATIVE DISC DISEASE, LUMBAR: Status: ACTIVE | Noted: 2019-12-09

## 2019-12-09 PROCEDURE — 3008F PR BODY MASS INDEX (BMI) DOCUMENTED: ICD-10-PCS | Mod: CPTII,S$GLB,, | Performed by: INTERNAL MEDICINE

## 2019-12-09 PROCEDURE — 3008F BODY MASS INDEX DOCD: CPT | Mod: CPTII,S$GLB,, | Performed by: INTERNAL MEDICINE

## 2019-12-09 PROCEDURE — 3074F SYST BP LT 130 MM HG: CPT | Mod: CPTII,S$GLB,, | Performed by: INTERNAL MEDICINE

## 2019-12-09 PROCEDURE — 3078F PR MOST RECENT DIASTOLIC BLOOD PRESSURE < 80 MM HG: ICD-10-PCS | Mod: CPTII,S$GLB,, | Performed by: INTERNAL MEDICINE

## 2019-12-09 PROCEDURE — 3074F PR MOST RECENT SYSTOLIC BLOOD PRESSURE < 130 MM HG: ICD-10-PCS | Mod: CPTII,S$GLB,, | Performed by: INTERNAL MEDICINE

## 2019-12-09 PROCEDURE — 99214 PR OFFICE/OUTPT VISIT, EST, LEVL IV, 30-39 MIN: ICD-10-PCS | Mod: S$GLB,,, | Performed by: INTERNAL MEDICINE

## 2019-12-09 PROCEDURE — 99999 PR PBB SHADOW E&M-EST. PATIENT-LVL III: CPT | Mod: PBBFAC,,, | Performed by: INTERNAL MEDICINE

## 2019-12-09 PROCEDURE — 3078F DIAST BP <80 MM HG: CPT | Mod: CPTII,S$GLB,, | Performed by: INTERNAL MEDICINE

## 2019-12-09 PROCEDURE — 99214 OFFICE O/P EST MOD 30 MIN: CPT | Mod: S$GLB,,, | Performed by: INTERNAL MEDICINE

## 2019-12-09 PROCEDURE — 99999 PR PBB SHADOW E&M-EST. PATIENT-LVL III: ICD-10-PCS | Mod: PBBFAC,,, | Performed by: INTERNAL MEDICINE

## 2019-12-09 RX ORDER — MESALAMINE 1.2 G/1
2.4 TABLET, DELAYED RELEASE ORAL DAILY
COMMUNITY
Start: 2018-01-23 | End: 2020-12-10 | Stop reason: SDUPTHER

## 2019-12-09 NOTE — ASSESSMENT & PLAN NOTE
On mesalamine, works well for her.  Sees Dr. Tobias.  Had issues with prep 6/2019, got really dehydrated.

## 2019-12-09 NOTE — ASSESSMENT & PLAN NOTE
Sees Dr. Wells, has arthritis.  Had MRI recently because pain has gotten worse.  Going to PT.  Taking Tylenol Arthritis BID, cannot do NSAIDS 2/2 UC.

## 2019-12-09 NOTE — ASSESSMENT & PLAN NOTE
secondary to the radiation treatment for tongue cancer - treated by oncologist.  On  Levothyroxine 75 mcg daily.

## 2019-12-09 NOTE — PROGRESS NOTES
[facility] Note    Chief Complaint      Chief Complaint   Patient presents with    Establish Care     History of Present Illness      Sarahy Ma is a 59 y.o. female who presents today to establish care for hypothyroidism.  Patient comes to appointment alone.     Treated by UC for UTI, on day 2 of keflex.  Having issues with taking TID, wants to take BID.  UTI symptoms have resolved.  Problem List Items Addressed This Visit     Postablative hypothyroidism - Primary    Current Assessment & Plan     secondary to the radiation treatment for tongue cancer - treated by oncologist.  On  Levothyroxine 75 mcg daily.         Relevant Orders    CBC auto differential    Lipid panel    Comprehensive metabolic panel    TSH    T4, free    Ulcerative colitis    Current Assessment & Plan     On mesalamine, works well for her.  Sees Dr. Tobias.  Had issues with prep 6/2019, got really dehydrated.         Relevant Orders    Ambulatory Referral to Gastroenterology    History of tongue cancer    Current Assessment & Plan     Sees Dr. Dobbins, sees annually.  No longer needs scans.         Degenerative disc disease, lumbar    Current Assessment & Plan     Sees Dr. Wells, has arthritis.  Had MRI recently because pain has gotten worse.  Going to PT.  Taking Tylenol Arthritis BID, cannot do NSAIDS 2/2 UC.           Other Visit Diagnoses     Arthralgia, unspecified joint        Relevant Orders    Rheumatoid factor    Sedimentation rate    C-reactive protein    Acute cystitis without hematuria              Health Maintenance   Topic Date Due    Mammogram  02/14/2019    Pap Smear with HPV Cotest  01/06/2020    Lipid Panel  11/02/2023    Colonoscopy  07/14/2024    TETANUS VACCINE  06/02/2027    Hepatitis C Screening  Completed       Past Medical History:   Diagnosis Date    Postablative hypothyroidism     secondary to the radiation treatment for tongue cancer - treated by oncologist    Tongue cancer      Ulcerative colitis     Treated by Dr. Tobias       Past Surgical History:   Procedure Laterality Date     SECTION      CHOLECYSTECTOMY  2016    removed    CHOLECYSTECTOMY  2016    COLONOSCOPY      Dr. Tobias    feeding tube      groin surgery  2017    swollen lump node    PORTACATH PLACEMENT      TONSILLECTOMY      TUBAL LIGATION         family history includes Diabetes in her father; Hypertension in her father and mother; Thyroid disease in her brother, brother, sister, and sister.    Social History     Tobacco Use    Smoking status: Former Smoker    Smokeless tobacco: Never Used   Substance Use Topics    Alcohol use: Yes     Comment: occasionally    Drug use: No       Review of Systems   Constitutional: Negative for chills and fever.   HENT: Negative for congestion and sore throat.    Eyes: Negative for blurred vision and discharge.   Respiratory: Negative for cough and shortness of breath.    Cardiovascular: Negative for chest pain and palpitations.   Gastrointestinal: Negative for constipation, diarrhea, nausea and vomiting.   Genitourinary: Negative for dysuria and hematuria.   Musculoskeletal: Negative for falls and myalgias.   Skin: Negative for itching and rash.   Neurological: Negative for dizziness and headaches.        Outpatient Encounter Medications as of 2019   Medication Sig Note Dispense Refill    cephALEXin (KEFLEX) 500 MG capsule Take 1 capsule by mouth three times a day.  21 capsule 0    conjugated estrogens (PREMARIN) vaginal cream Place 0.5 g vaginally twice a week.       levothyroxine (SYNTHROID) 75 MCG tablet Take 1 tablet (75 mcg total) by mouth before breakfast.  30 tablet 11    lisinopril (PRINIVIL,ZESTRIL) 5 MG tablet Take 1 tablet (5 mg total) by mouth once daily.  30 tablet 0    mesalamine (LIALDA) 1.2 gram TbEC Take 2.4 g by mouth once daily.       [DISCONTINUED] ciprofloxacin HCl (CIPRO) 500 MG tablet Take 1 tablet (500 mg total) by  "mouth 2 (two) times daily. (Patient not taking: Reported on 12/9/2019)  14 tablet 0    [DISCONTINUED] flu vacc [id number] 6mos up,PF, 60 mcg (15 mcg x 4)/0.5 mL Syrg To be admin by [facility] (Patient not taking: Reported on 12/9/2019)  0.5 mL 0    [DISCONTINUED] LIALDA 1.2 gram TbEC Take 2.4 g by mouth every morning. 8/16/2017: Received from: External Pharmacy Received Sig: TAKE 2 TABLETS BY MOUTH EVERY MORNING  4    [DISCONTINUED] nitrofurantoin, macrocrystal-monohydrate, (MACROBID) 100 MG capsule Take 1 capsule (100 mg total) by mouth 2 (two) times daily. (Patient not taking: Reported on 12/9/2019)  14 capsule 0    [DISCONTINUED] phenazopyridine (PYRIDIUM) 100 MG tablet Take 1 tablet (100 mg total) by mouth 3 (three) times daily as needed. (Patient not taking: Reported on 12/9/2019)  6 tablet 0     No facility-administered encounter medications on file as of 12/9/2019.         Review of patient's allergies indicates:   Allergen Reactions    Amoxicillin      Other reaction(s): fever blisters    Nsaids (non-steroidal anti-inflammatory drug)      Other reaction(s): nausea, stomach issues       Physical Exam      Vital Signs  Temp: 98 °F (36.7 °C)  Temp src: Oral  Pulse: 76  SpO2: 97 %  BP: 110/70  BP Location: Left arm  Patient Position: Sitting  Pain Score: 0-No pain  Height and Weight  Height: 5' 2" (157.5 cm)  Weight: 52.6 kg (115 lb 13.6 oz)  BSA (Calculated - sq m): 1.52 sq meters  BMI (Calculated): 21.2  Weight in (lb) to have BMI = 25: 136.4]    Physical Exam   Constitutional: She is oriented to person, place, and time. She appears well-developed and well-nourished.   HENT:   Head: Normocephalic and atraumatic.   Right Ear: External ear normal.   Left Ear: External ear normal.   Eyes: Right eye exhibits no discharge. Left eye exhibits no discharge.   Neck: Normal range of motion. No thyromegaly present.   Cardiovascular: Normal rate, regular rhythm, normal heart sounds and intact distal pulses.   No murmur " heard.  Pulmonary/Chest: Effort normal and breath sounds normal. No respiratory distress.   Abdominal: Soft. Bowel sounds are normal. She exhibits no distension. There is no tenderness.   Musculoskeletal: Normal range of motion. She exhibits no deformity.   Neurological: She is alert and oriented to person, place, and time.   Skin: Skin is warm and dry. No rash noted.   Psychiatric: She has a normal mood and affect. Her behavior is normal.        Laboratory:  CBC:  No results for input(s): WBC, RBC, HGB, HCT, PLT, MCV, MCH, MCHC in the last 2160 hours.  CMP:  No results for input(s): GLU, CALCIUM, ALBUMIN, PROT, NA, K, CO2, CL, BUN, ALKPHOS, ALT, AST, BILITOT in the last 2160 hours.    Invalid input(s): CREATININ  URINALYSIS:  No results for input(s): COLORU, CLARITYU, SPECGRAV, PHUR, PROTEINUA, GLUCOSEU, BILIRUBINCON, BLOODU, WBCU, RBCU, BACTERIA, MUCUS, NITRITE, LEUKOCYTESUR, UROBILINOGEN, HYALINECASTS in the last 2160 hours.   LIPIDS:  No results for input(s): TSH, HDL, CHOL, TRIG, LDLCALC, CHOLHDL, NONHDLCHOL, TOTALCHOLEST in the last 2160 hours.  TSH:  No results for input(s): TSH in the last 2160 hours.  A1C:  No results for input(s): HGBA1C in the last 2160 hours.    Radiology:  No imaging on file    Assessment/Plan     Sarahy Ma is a 59 y.o.female with:    1. Postablative hypothyroidism  - CBC auto differential; Future  - Lipid panel; Future  - Comprehensive metabolic panel; Future  - TSH; Future  - T4, free; Future    2. Other ulcerative colitis without complication  - Ambulatory Referral to Gastroenterology    3. History of tongue cancer    4. Degenerative disc disease, lumbar    5. Arthralgia, unspecified joint  - Rheumatoid factor; Future  - Sedimentation rate; Future  - C-reactive protein; Future    6. Acute cystitis without hematuria    -labs in [place], will refer to Dr. Eaton for UC  -Continue current medications and maintain follow up with specialists.  Return to clinic in 6 months.      Barb VENEGAS  MD Zack  [facility]

## 2019-12-18 ENCOUNTER — TELEPHONE (OUTPATIENT)
Dept: PULMONOLOGY | Facility: CLINIC | Age: 59
End: 2019-12-18

## 2019-12-18 DIAGNOSIS — R06.02 SOB (SHORTNESS OF BREATH): Primary | ICD-10-CM

## 2019-12-26 RX ORDER — LISINOPRIL 5 MG/1
5 TABLET ORAL DAILY
Qty: 90 TABLET | Refills: 3 | Status: SHIPPED | OUTPATIENT
Start: 2019-12-26 | End: 2020-03-12 | Stop reason: ALTCHOICE

## 2019-12-27 ENCOUNTER — PATIENT OUTREACH (OUTPATIENT)
Dept: ADMINISTRATIVE | Facility: OTHER | Age: 59
End: 2019-12-27

## 2019-12-31 ENCOUNTER — HOSPITAL ENCOUNTER (OUTPATIENT)
Dept: PULMONOLOGY | Facility: CLINIC | Age: 59
Discharge: HOME | End: 2019-12-31
Payer: COMMERCIAL

## 2019-12-31 ENCOUNTER — OFFICE VISIT (OUTPATIENT)
Dept: PULMONOLOGY | Facility: CLINIC | Age: 59
End: 2019-12-31
Payer: COMMERCIAL

## 2019-12-31 VITALS
DIASTOLIC BLOOD PRESSURE: 79 MMHG | BODY MASS INDEX: 21.53 KG/M2 | HEART RATE: 80 BPM | SYSTOLIC BLOOD PRESSURE: 110 MMHG | WEIGHT: 117 LBS | HEIGHT: 62 IN | OXYGEN SATURATION: 98 %

## 2019-12-31 DIAGNOSIS — Z85.810 HISTORY OF TONGUE CANCER: ICD-10-CM

## 2019-12-31 DIAGNOSIS — R06.02 SOB (SHORTNESS OF BREATH): ICD-10-CM

## 2019-12-31 DIAGNOSIS — R91.1 NODULE OF UPPER LOBE OF RIGHT LUNG: Primary | ICD-10-CM

## 2019-12-31 LAB
DLCO ADJ PRE: 16.49 ML/(MIN*MMHG) (ref 16.09–27.56)
DLCO SINGLE BREATH LLN: 16.09
DLCO SINGLE BREATH PRE REF: 76.9 %
DLCO SINGLE BREATH REF: 21.83
DLCOC SBVA LLN: 3.15
DLCOC SBVA PRE REF: 88.6 %
DLCOC SBVA REF: 4.77
DLCOC SINGLE BREATH LLN: 16.09
DLCOC SINGLE BREATH PRE REF: 75.6 %
DLCOC SINGLE BREATH REF: 21.83
DLCOCSBVAULN: 6.4
DLCOCSINGLEBREATHULN: 27.56
DLCOSINGLEBREATHULN: 27.56
DLCOVA LLN: 3.15
DLCOVA PRE REF: 90.2 %
DLCOVA PRE: 4.31 ML/(MIN*MMHG*L) (ref 3.15–6.4)
DLCOVA REF: 4.77
DLCOVAULN: 6.4
DLVAADJ PRE: 4.23 ML/(MIN*MMHG*L) (ref 3.15–6.4)
ERVN2 LLN: 0.82
ERVN2 PRE REF: 145.1 %
ERVN2 PRE: 1.19 L (ref 0.82–0.82)
ERVN2 REF: 0.82
ERVN2ULN: 0.82
FEF 25 75 LLN: 1.19
FEF 25 75 PRE REF: 115 %
FEF 25 75 REF: 2.26
FEV05 LLN: 0.92
FEV05 REF: 1.78
FEV1 FVC LLN: 68
FEV1 FVC PRE REF: 99.5 %
FEV1 FVC REF: 80
FEV1 LLN: 1.82
FEV1 PRE REF: 102.6 %
FEV1 REF: 2.38
FRCN2 LLN: 1.75
FRCN2 PRE REF: 91.7 %
FRCN2 REF: 2.57
FRCN2ULN: 3.4
FVC LLN: 2.29
FVC PRE REF: 102.6 %
FVC REF: 2.99
IVC PRE: 2.58 L (ref 2.29–3.7)
IVC SINGLE BREATH LLN: 2.29
IVC SINGLE BREATH PRE REF: 86.2 %
IVC SINGLE BREATH REF: 2.99
IVCSINGLEBREATHULN: 3.7
PEF LLN: 4.52
PEF PRE REF: 118.3 %
PEF REF: 6.12
PHYSICIAN COMMENT: ABNORMAL
PRE DLCO: 16.79 ML/(MIN*MMHG) (ref 16.09–27.56)
PRE FEF 25 75: 2.6 L/S (ref 1.19–3.34)
PRE FET 100: 6.82 SEC
PRE FEV05 REF: 116.6 %
PRE FEV1 FVC: 79.47 % (ref 68.11–91.72)
PRE FEV1: 2.44 L (ref 1.82–2.95)
PRE FEV5: 2.07 L (ref 0.92–2.63)
PRE FRC N2: 2.36 L
PRE FVC: 3.07 L (ref 2.29–3.7)
PRE PEF: 7.24 L/S (ref 4.52–7.71)
RVN2 LLN: 1.18
RVN2 PRE REF: 66.7 %
RVN2 PRE: 1.17 L (ref 1.18–2.33)
RVN2 REF: 1.75
RVN2TLCN2 LLN: 28.75
RVN2TLCN2 PRE REF: 74.8 %
RVN2TLCN2 PRE: 28.68 % (ref 28.75–47.93)
RVN2TLCN2 REF: 38.34
RVN2TLCN2ULN: 47.93
RVN2ULN: 2.33
TLCN2 LLN: 3.59
TLCN2 PRE REF: 89.2 %
TLCN2 PRE: 4.08 L (ref 3.58–5.56)
TLCN2 REF: 4.57
TLCN2ULN: 5.56
VA PRE: 3.9 L (ref 4.42–4.42)
VA SINGLE BREATH LLN: 4.42
VA SINGLE BREATH PRE REF: 88.2 %
VA SINGLE BREATH REF: 4.42
VASINGLEBREATHULN: 4.42
VCMAXN2 LLN: 2.29
VCMAXN2 PRE REF: 97.2 %
VCMAXN2 PRE: 2.91 L (ref 2.29–3.7)
VCMAXN2 REF: 2.99
VCMAXN2ULN: 3.7

## 2019-12-31 PROCEDURE — 3008F BODY MASS INDEX DOCD: CPT | Mod: CPTII,S$GLB,, | Performed by: INTERNAL MEDICINE

## 2019-12-31 PROCEDURE — 94727 PR PULM FUNCTION TEST BY GAS: ICD-10-PCS | Mod: S$GLB,,, | Performed by: INTERNAL MEDICINE

## 2019-12-31 PROCEDURE — 99203 PR OFFICE/OUTPT VISIT, NEW, LEVL III, 30-44 MIN: ICD-10-PCS | Mod: 25,S$GLB,, | Performed by: INTERNAL MEDICINE

## 2019-12-31 PROCEDURE — 99999 PR PBB SHADOW E&M-EST. PATIENT-LVL III: ICD-10-PCS | Mod: PBBFAC,,, | Performed by: INTERNAL MEDICINE

## 2019-12-31 PROCEDURE — 3008F PR BODY MASS INDEX (BMI) DOCUMENTED: ICD-10-PCS | Mod: CPTII,S$GLB,, | Performed by: INTERNAL MEDICINE

## 2019-12-31 PROCEDURE — 94727 GAS DIL/WSHOT DETER LNG VOL: CPT | Mod: S$GLB,,, | Performed by: INTERNAL MEDICINE

## 2019-12-31 PROCEDURE — 94729 PR C02/MEMBANE DIFFUSE CAPACITY: ICD-10-PCS | Mod: S$GLB,,, | Performed by: INTERNAL MEDICINE

## 2019-12-31 PROCEDURE — 3078F DIAST BP <80 MM HG: CPT | Mod: CPTII,S$GLB,, | Performed by: INTERNAL MEDICINE

## 2019-12-31 PROCEDURE — 94010 BREATHING CAPACITY TEST: ICD-10-PCS | Mod: S$GLB,,, | Performed by: INTERNAL MEDICINE

## 2019-12-31 PROCEDURE — 3074F PR MOST RECENT SYSTOLIC BLOOD PRESSURE < 130 MM HG: ICD-10-PCS | Mod: CPTII,S$GLB,, | Performed by: INTERNAL MEDICINE

## 2019-12-31 PROCEDURE — 94010 BREATHING CAPACITY TEST: CPT | Mod: S$GLB,,, | Performed by: INTERNAL MEDICINE

## 2019-12-31 PROCEDURE — 3074F SYST BP LT 130 MM HG: CPT | Mod: CPTII,S$GLB,, | Performed by: INTERNAL MEDICINE

## 2019-12-31 PROCEDURE — 99999 PR PBB SHADOW E&M-EST. PATIENT-LVL III: CPT | Mod: PBBFAC,,, | Performed by: INTERNAL MEDICINE

## 2019-12-31 PROCEDURE — 94729 DIFFUSING CAPACITY: CPT | Mod: S$GLB,,, | Performed by: INTERNAL MEDICINE

## 2019-12-31 PROCEDURE — 3078F PR MOST RECENT DIASTOLIC BLOOD PRESSURE < 80 MM HG: ICD-10-PCS | Mod: CPTII,S$GLB,, | Performed by: INTERNAL MEDICINE

## 2019-12-31 PROCEDURE — 99203 OFFICE O/P NEW LOW 30 MIN: CPT | Mod: 25,S$GLB,, | Performed by: INTERNAL MEDICINE

## 2019-12-31 RX ORDER — DICLOFENAC SODIUM 10 MG/G
GEL TOPICAL
Refills: 2 | COMMUNITY
Start: 2019-09-24 | End: 2020-03-09

## 2019-12-31 NOTE — PROGRESS NOTES
"Subjective:       Patient ID: Sarahy Ma is a 59 y.o. female.    Chief Complaint: Shortness of Breath    HPI     Ms. Ma is 59 year old woman who comes to Pulmonary Clinic for evaluation of an abnormal chest CT that was performed 12/16/2019 for evaluation of persistent dyspnea.  She has no specific pulmonary history but previously smoked cigarettes (1/3 to 1/2 pack per day for 20-25 years) until successful cessation 12 years ago.      She noted episodic shortness of breath without antecedent symptoms in early December.  She denies wheezing or cough, but acknowledges some chest tightness that she describes as a "weird feeling but not like pressure."  She observed that her breathing felt better when inhaling cool/cold air, especially when she put her head in the open freezer!  Because of these symptoms, she went to Urgent Care on 12/16/2019.  Chest CTA showed no thromboembolic disease but did show several parenchymal lung findings: a small right upper lobe non-calcified nodule (4 mm), localized micronodules with tree in bud appearance also in the RUL, and several calcified nodules in the right base typical for calcified granulomas.  Since the Urgent Care visit, she has been using an albuterol inhaler occasionally (a couple times per week) but doesn't notice much relief.    Other history includes:  · Stage 3 tongue cancer treated with XRT/chemotherapy at [facility] in 2009.  · Ulcerative colitis since 2017 treated with mesalamine but no biologic agents to date.  She is due for follow up colonoscopy soon to reassess.     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      Review of Systems   Constitutional: Negative for fever, chills, weight loss, activity change, fatigue and night sweats.   HENT: Positive for postnasal drip. Negative for sore throat, trouble swallowing and voice change.    Respiratory: Positive for use of rescue inhaler. Negative for cough, hemoptysis, sputum " production, chest tightness, shortness of breath, wheezing, previous hospitialization due to pulmonary problems, asthma nighttime symptoms, pleurisy and dyspnea on extertion.    Cardiovascular: Positive for chest pain (Occasional tightness). Negative for palpitations and leg swelling.   Musculoskeletal: Negative for arthralgias and myalgias.   Gastrointestinal: Negative for nausea and acid reflux.   Neurological: Negative for weakness and light-headedness.   Psychiatric/Behavioral: Negative for sleep disturbance.       Objective:      Physical Exam   Constitutional: She is oriented to person, place, and time. No distress.   Cardiovascular: Normal rate, regular rhythm and normal heart sounds. Exam reveals no gallop.   No murmur heard.  Pulmonary/Chest: Normal expansion, symmetric chest wall expansion, effort normal and breath sounds normal. No stridor. No respiratory distress. She has no decreased breath sounds. She has no wheezes. She has no rhonchi. She has no rales.   Musculoskeletal: She exhibits no edema.   Lymphadenopathy: No supraclavicular adenopathy is present.     She has no cervical adenopathy.   Neurological: She is alert and oriented to person, place, and time. Gait normal.   Skin: No cyanosis. Nails show no clubbing.   Psychiatric: She has a normal mood and affect. Judgment normal.   Nursing note and vitals reviewed.    Personal Diagnostic Review    Chest CTA from 12/16/2019 was personally reviewed with the patient at this visit => there is a 4 mm nodule in the posterior RUL and calcified granulomas in the right lower lobe; there is a cluster of small nodules with tree-in-bud changes in the periphery of the RUL.  There are no prior images for comparison.    FINDINGS:  This is an adequate pulmonary embolus study which is negative for pulmonary embolus.  There is no thoracic aortic aneurysm.    The soft tissues and vascular structures at the base of the neck are unremarkable.  The mediastinum including  the heart and great vessels is significant for calcification of the aorta, aortic annulus, and coronary arteries.  The visualized intra-abdominal content is significant for calcification of the visualized abdominal aorta.  The osseous structures demonstrate degenerative change.    The trachea is patent and free of any intraluminal filling defects.  The lungs are well expanded.  There is a 0.4 cm right upper lobe noncalcified pulmonary nodule.  There is a punctate right lower lobe calcified granuloma consistent with prior granulomatous disease.  There are scattered right upper lobe pulmonary micro nodules some of which have a tree-in-bud configuration can not exclude infection.      Impression       Right upper lobe noncalcified pulmonary nodule consider follow-up chest CT in 12 months.    Scattered right upper lobe pulmonary micro nodules some of which have a tree-in-bud appearance can not exclude infection.       PFTs were personally reviewed at this visit => Spirometry and lung volumes are normal with borderline reduced DLCO.    PFTs 12/31/2019   FVC  (pre-BD) 3.07 liters   FVC%  103%   FEV1 (pre-BD) 2.44 liters   FEV1%  103%   FEV1/FVC  79   SVC 2.91 liters   SVC% 97%   TLC  4.08 liters   TLC%  89%   RV  1.17 liters   RV%  67%   DLCO  16.8 mL/mmHg/min   DLCO%  77%   VA 3.90 liters   IVC 2.58 liters       Assessment:       1. Nodule of upper lobe of right lung    2. History of tongue cancer    3. SOB (shortness of breath)        Outpatient Encounter Medications as of 12/31/2019   Medication Sig Dispense Refill    albuterol (PROVENTIL/VENTOLIN HFA) 90 mcg/actuation inhaler Inhale 1-2 puffs into the lungs every 6 (six) hours as needed. Rescue 1 Inhaler 0    cephALEXin (KEFLEX) 500 MG capsule Take 1 capsule by mouth three times a day. 21 capsule 0    conjugated estrogens (PREMARIN) vaginal cream Place 0.5 g vaginally twice a week.      diclofenac sodium (VOLTAREN) 1 % Gel APPLY 2 GRAMS 4 TIMES A DAY  2     "levothyroxine (SYNTHROID) 75 MCG tablet Take 1 tablet (75 mcg total) by mouth before breakfast. 30 tablet 11    lisinopril (PRINIVIL,ZESTRIL) 5 MG tablet Take 1 tablet (5 mg total) by mouth once daily. 90 tablet 3    mesalamine (LIALDA) 1.2 gram TbEC Take 2.4 g by mouth once daily.       No facility-administered encounter medications on file as of 12/31/2019.      Orders Placed This Encounter   Procedures    CT Chest Without Contrast     Standing Status:   Future     Standing Expiration Date:   12/31/2020     Order Specific Question:   May the Radiologist modify the order per protocol to meet the clinical needs of the patient?     Answer:   Yes       Plan:   Given Ms. aM's history of smoking and past head/neck (tongue) cancer, she will require surveillance imaging to follow the right upper lobe lung nodule.  I suspect that her recent symptoms are not related to this finding, although there is the outside chance of MAC as a cause of the CT findings and her symptoms.  She is only using the rescue inhaler once a week or so presently.  If this increases, we will consider addition of a controller medication for "twitchy airways."  We will plan on repeat non-contrast chest CT in 6 months.    Problem List Items Addressed This Visit     History of tongue cancer    Overview     Diagnosed/treated at [facility] in 2009.  Reportedly Stage 3 at time of diagnosis => treated with XRT/chemotherapy.         Current Assessment & Plan     No evidence of active disease per patient report.         Nodule of upper lobe of right lung - Primary    Overview     Chest CTA in December 2019 with 4 mm nodule in right upper lobe (posteriorly).  Other findings included area of modest tree-in-bud change, along with calcified granuloma(s) in the right base.         Current Assessment & Plan     Given past smoking history (1/3-1/2 pack/day for about 20-25 years) and prior history of oral cancer, her cancer risk is increased.    Diagnostic " "approaches reviewed with the patient to include: serial imaging (CT scan); biopsy via bronchoscopy/percutaneous biopsy; surgical resection.  The 4 mm nodule is too small for adequate visualization by PET scanning.    · Will repeat non-contrast chest CT in 6 months.         Relevant Orders    CT Chest Without Contrast    SOB (shortness of breath)    Overview     Onset of symptoms in December 2019 with negative chest CTA.  Episodic relief with albuterol rescue inhaler but PFTs are normal, so I doubt significant asthma.         Current Assessment & Plan     · Okay to use prn albuterol rescue inhaler for presumed "twitchy airways."    · If frequency increases significantly, will consider brief course of controller medication.               Mathew Foreman MD  Pulmonary/Critical Care Medicine    "

## 2019-12-31 NOTE — ASSESSMENT & PLAN NOTE
"· Okay to use prn albuterol rescue inhaler for presumed "twitchy airways."    · If frequency increases significantly, will consider brief course of controller medication.    "

## 2019-12-31 NOTE — ASSESSMENT & PLAN NOTE
Given past smoking history (1/3-1/2 pack/day for about 20-25 years) and prior history of oral cancer, her cancer risk is increased.    Diagnostic approaches reviewed with the patient to include: serial imaging (CT scan); biopsy via bronchoscopy/percutaneous biopsy; surgical resection.  The 4 mm nodule is too small for adequate visualization by PET scanning.    · Will repeat non-contrast chest CT in 6 months.

## 2020-01-02 ENCOUNTER — OFFICE VISIT (OUTPATIENT)
Dept: UROGYNECOLOGY | Facility: CLINIC | Age: 60
End: 2020-01-02
Payer: COMMERCIAL

## 2020-01-02 ENCOUNTER — PATIENT MESSAGE (OUTPATIENT)
Dept: UROGYNECOLOGY | Facility: CLINIC | Age: 60
End: 2020-01-02

## 2020-01-02 ENCOUNTER — PATIENT OUTREACH (OUTPATIENT)
Dept: ADMINISTRATIVE | Facility: OTHER | Age: 60
End: 2020-01-02

## 2020-01-02 VITALS
HEIGHT: 62 IN | DIASTOLIC BLOOD PRESSURE: 72 MMHG | WEIGHT: 116.38 LBS | SYSTOLIC BLOOD PRESSURE: 118 MMHG | BODY MASS INDEX: 21.42 KG/M2

## 2020-01-02 DIAGNOSIS — N95.2 POSTMENOPAUSE ATROPHIC VAGINITIS: Primary | ICD-10-CM

## 2020-01-02 PROCEDURE — 99999 PR PBB SHADOW E&M-EST. PATIENT-LVL III: ICD-10-PCS | Mod: PBBFAC,,, | Performed by: OBSTETRICS & GYNECOLOGY

## 2020-01-02 PROCEDURE — 99999 PR PBB SHADOW E&M-EST. PATIENT-LVL III: CPT | Mod: PBBFAC,,, | Performed by: OBSTETRICS & GYNECOLOGY

## 2020-01-02 PROCEDURE — 3074F PR MOST RECENT SYSTOLIC BLOOD PRESSURE < 130 MM HG: ICD-10-PCS | Mod: CPTII,S$GLB,, | Performed by: OBSTETRICS & GYNECOLOGY

## 2020-01-02 PROCEDURE — 3074F SYST BP LT 130 MM HG: CPT | Mod: CPTII,S$GLB,, | Performed by: OBSTETRICS & GYNECOLOGY

## 2020-01-02 PROCEDURE — 99211 OFF/OP EST MAY X REQ PHY/QHP: CPT | Mod: S$GLB,,, | Performed by: OBSTETRICS & GYNECOLOGY

## 2020-01-02 PROCEDURE — 99211 PR OFFICE/OUTPT VISIT, EST, LEVL I: ICD-10-PCS | Mod: S$GLB,,, | Performed by: OBSTETRICS & GYNECOLOGY

## 2020-01-02 PROCEDURE — 3078F PR MOST RECENT DIASTOLIC BLOOD PRESSURE < 80 MM HG: ICD-10-PCS | Mod: CPTII,S$GLB,, | Performed by: OBSTETRICS & GYNECOLOGY

## 2020-01-02 PROCEDURE — 3078F DIAST BP <80 MM HG: CPT | Mod: CPTII,S$GLB,, | Performed by: OBSTETRICS & GYNECOLOGY

## 2020-01-02 NOTE — PROGRESS NOTES
Subjective:      Patient ID: Sarahy Ma is a 59 y.o. female.    Chief Complaint:  recurrent uti      History of Present Illness  Patient well known to me as I had operated in completed complex reconstructive surgery on the patient before leaving [facility] patient is doing wonderful she does need her medicine a [employer] that is only rising she is here and she has had several urinary tract infections.  Patient does not need to be exam does not want to be examined today          Past Medical History:   Diagnosis Date    Postablative hypothyroidism     secondary to the radiation treatment for tongue cancer - treated by oncologist    Tongue cancer     Ulcerative colitis     Treated by Dr. Tobias       Past Surgical History:   Procedure Laterality Date     SECTION      CHOLECYSTECTOMY  2016    removed    CHOLECYSTECTOMY  2016    COLONOSCOPY      Dr. Tobias    feeding tube      groin surgery  2017    swollen lump node    PORTACATH PLACEMENT      TONSILLECTOMY      TUBAL LIGATION         GYN & OB History  No LMP recorded. Patient is postmenopausal.   Date of Last Pap: No result found    OB History    Para Term  AB Living   4 4 4         SAB TAB Ectopic Multiple Live Births                  # Outcome Date GA Lbr Matthew/2nd Weight Sex Delivery Anes PTL Lv   4 Term            3 Term            2 Term            1 Term                Health Maintenance       Date Due Completion Date    Shingles Vaccine (1 of 2) 05/15/2012 ---    Mammogram 2019 (Done)    Override on 2017: Done ([facility])    Override on 3/14/2016: Done    Override on 7/15/2014: Done    Pap Smear with HPV Cotest 2020 (Done)    Override on 2017: Done (Dr. [place])    Override on 2016: Done    Override on 2014: Done    Lipid Panel 2023    Colonoscopy 2024 (Done)    Override on 2014: Done (normal - repeat in  "10 years - Dr. Greene)    TETANUS VACCINE 06/02/2027 6/2/2017          Family History   Problem Relation Age of Onset    Hypertension Father     Diabetes Father     Hypertension Mother     Thyroid disease Sister     Thyroid disease Brother     Thyroid disease Sister     Thyroid disease Brother        Social History     Socioeconomic History    Marital status:      Spouse name: Not on file    Number of children: Not on file    Years of education: Not on file    Highest education level: Not on file   Occupational History    Not on file   Social Needs    Financial resource strain: Not on file    Food insecurity:     Worry: Not on file     Inability: Not on file    Transportation needs:     Medical: Not on file     Non-medical: Not on file   Tobacco Use    Smoking status: Former Smoker    Smokeless tobacco: Never Used   Substance and Sexual Activity    Alcohol use: Yes     Comment: occasionally    Drug use: No    Sexual activity: Not on file   Lifestyle    Physical activity:     Days per week: Not on file     Minutes per session: Not on file    Stress: Not on file   Relationships    Social connections:     Talks on phone: Not on file     Gets together: Not on file     Attends [employer] service: Not on file     Active member of club or organization: Not on file     Attends meetings of clubs or organizations: Not on file     Relationship status: Not on file   Other Topics Concern    Not on file   Social History Narrative    Not on file       Review of Systems  Review of Systems   All other systems reviewed and are negative.         Objective:   /72 (BP Location: Right arm, Patient Position: Sitting)   Ht 5' 2" (1.575 m)   Wt 52.8 kg (116 lb 6.5 oz)   BMI 21.29 kg/m²     Physical Exam   Deferred  Assessment:     1. Postmenopause atrophic vaginitis            Plan:     1. Postmenopause atrophic vaginitis      Prep in written 4 and I have asked patient to take cranberry tablets " with D sangeeta bray much more actively involved this is to continue I asked patient reach out for any other episodes of any a cystitis patient noted understanding appreciated the time she is happy to reestablish care Total time of this visit today the 5 min with essentially 100% of time in discussion with the patient see how she is doing at this time since reconstruction  There are no Patient Instructions on file for this visit.

## 2020-01-24 ENCOUNTER — PATIENT OUTREACH (OUTPATIENT)
Dept: ADMINISTRATIVE | Facility: OTHER | Age: 60
End: 2020-01-24

## 2020-01-26 ENCOUNTER — PATIENT MESSAGE (OUTPATIENT)
Dept: FAMILY MEDICINE | Facility: CLINIC | Age: 60
End: 2020-01-26

## 2020-01-27 ENCOUNTER — PATIENT MESSAGE (OUTPATIENT)
Dept: FAMILY MEDICINE | Facility: CLINIC | Age: 60
End: 2020-01-27

## 2020-01-31 ENCOUNTER — LAB VISIT (OUTPATIENT)
Dept: LAB | Facility: HOSPITAL | Age: 60
End: 2020-01-31
Attending: INTERNAL MEDICINE
Payer: COMMERCIAL

## 2020-01-31 DIAGNOSIS — M25.50 ARTHRALGIA, UNSPECIFIED JOINT: ICD-10-CM

## 2020-01-31 DIAGNOSIS — E89.0 POSTABLATIVE HYPOTHYROIDISM: ICD-10-CM

## 2020-01-31 LAB
ALBUMIN SERPL BCP-MCNC: 4 G/DL (ref 3.5–5.2)
ALP SERPL-CCNC: 67 U/L (ref 55–135)
ALT SERPL W/O P-5'-P-CCNC: 16 U/L (ref 10–44)
ANION GAP SERPL CALC-SCNC: 6 MMOL/L (ref 8–16)
AST SERPL-CCNC: 17 U/L (ref 10–40)
BASOPHILS # BLD AUTO: 0.02 K/UL (ref 0–0.2)
BASOPHILS NFR BLD: 0.5 % (ref 0–1.9)
BILIRUB SERPL-MCNC: 0.6 MG/DL (ref 0.1–1)
BUN SERPL-MCNC: 12 MG/DL (ref 6–20)
CALCIUM SERPL-MCNC: 9.5 MG/DL (ref 8.7–10.5)
CHLORIDE SERPL-SCNC: 105 MMOL/L (ref 95–110)
CHOLEST SERPL-MCNC: 214 MG/DL (ref 120–199)
CHOLEST/HDLC SERPL: 2.9 {RATIO} (ref 2–5)
CO2 SERPL-SCNC: 28 MMOL/L (ref 23–29)
CREAT SERPL-MCNC: 0.9 MG/DL (ref 0.5–1.4)
CRP SERPL-MCNC: 0.3 MG/L (ref 0–8.2)
DIFFERENTIAL METHOD: ABNORMAL
EOSINOPHIL # BLD AUTO: 0.1 K/UL (ref 0–0.5)
EOSINOPHIL NFR BLD: 2.8 % (ref 0–8)
ERYTHROCYTE [DISTWIDTH] IN BLOOD BY AUTOMATED COUNT: 12.3 % (ref 11.5–14.5)
ERYTHROCYTE [SEDIMENTATION RATE] IN BLOOD BY WESTERGREN METHOD: 10 MM/HR (ref 0–20)
EST. GFR  (AFRICAN AMERICAN): >60 ML/MIN/1.73 M^2
EST. GFR  (NON AFRICAN AMERICAN): >60 ML/MIN/1.73 M^2
GLUCOSE SERPL-MCNC: 83 MG/DL (ref 70–110)
HCT VFR BLD AUTO: 40.1 % (ref 37–48.5)
HDLC SERPL-MCNC: 73 MG/DL (ref 40–75)
HDLC SERPL: 34.1 % (ref 20–50)
HGB BLD-MCNC: 13 G/DL (ref 12–16)
LDLC SERPL CALC-MCNC: 119.6 MG/DL (ref 63–159)
LYMPHOCYTES # BLD AUTO: 1.4 K/UL (ref 1–4.8)
LYMPHOCYTES NFR BLD: 35.5 % (ref 18–48)
MCH RBC QN AUTO: 30 PG (ref 27–31)
MCHC RBC AUTO-ENTMCNC: 32.4 G/DL (ref 32–36)
MCV RBC AUTO: 93 FL (ref 82–98)
MONOCYTES # BLD AUTO: 0.5 K/UL (ref 0.3–1)
MONOCYTES NFR BLD: 12.1 % (ref 4–15)
NEUTROPHILS # BLD AUTO: 1.9 K/UL (ref 1.8–7.7)
NEUTROPHILS NFR BLD: 49.1 % (ref 38–73)
NONHDLC SERPL-MCNC: 141 MG/DL
PLATELET # BLD AUTO: 202 K/UL (ref 150–350)
PMV BLD AUTO: 9.6 FL (ref 9.2–12.9)
POTASSIUM SERPL-SCNC: 3.9 MMOL/L (ref 3.5–5.1)
PROT SERPL-MCNC: 6.8 G/DL (ref 6–8.4)
RBC # BLD AUTO: 4.33 M/UL (ref 4–5.4)
RHEUMATOID FACT SERPL-ACNC: 11 IU/ML (ref 0–15)
SODIUM SERPL-SCNC: 139 MMOL/L (ref 136–145)
T4 FREE SERPL-MCNC: 1.16 NG/DL (ref 0.71–1.51)
TRIGL SERPL-MCNC: 107 MG/DL (ref 30–150)
TSH SERPL DL<=0.005 MIU/L-ACNC: 1.94 UIU/ML (ref 0.4–4)
WBC # BLD AUTO: 3.89 K/UL (ref 3.9–12.7)

## 2020-01-31 PROCEDURE — 86431 RHEUMATOID FACTOR QUANT: CPT

## 2020-01-31 PROCEDURE — 85652 RBC SED RATE AUTOMATED: CPT

## 2020-01-31 PROCEDURE — 80061 LIPID PANEL: CPT

## 2020-01-31 PROCEDURE — 86140 C-REACTIVE PROTEIN: CPT

## 2020-01-31 PROCEDURE — 85025 COMPLETE CBC W/AUTO DIFF WBC: CPT

## 2020-01-31 PROCEDURE — 80053 COMPREHEN METABOLIC PANEL: CPT

## 2020-01-31 PROCEDURE — 84439 ASSAY OF FREE THYROXINE: CPT

## 2020-01-31 PROCEDURE — 84443 ASSAY THYROID STIM HORMONE: CPT

## 2020-01-31 PROCEDURE — 36415 COLL VENOUS BLD VENIPUNCTURE: CPT

## 2020-01-31 NOTE — PROGRESS NOTES
Labs look great.  Thyroid levels in normal range.  Rheumatoid testing negative.    Please let patient know that their cholesterol is high.  Given lack of other risk factors for stroke/heart attack, no need for medication at this time.  Patient just needs to work on low fat/healthy diet.

## 2020-02-10 DIAGNOSIS — N95.2 POSTMENOPAUSE ATROPHIC VAGINITIS: Primary | ICD-10-CM

## 2020-02-11 RX ORDER — ESTRADIOL 0.1 MG/G
1 CREAM VAGINAL
Qty: 12 G | Refills: 11 | Status: SHIPPED | OUTPATIENT
Start: 2020-02-12 | End: 2020-03-09

## 2020-02-24 ENCOUNTER — NURSE TRIAGE (OUTPATIENT)
Dept: ADMINISTRATIVE | Facility: CLINIC | Age: 60
End: 2020-02-24

## 2020-02-25 NOTE — TELEPHONE ENCOUNTER
Pt calling, states she was seen in ED two days ago and put on metoprolol for HTN. Reports she took it today and had a small moment of lightheadedness when standing and her BP was 90/60s. Last BP tonight was 125/79, wants to know if she should take the metoprolol tonight. She states she asked the ER nurse about this situation and she was told to speak with [facility]. She also had multiple complaints about her care in the ED. I offered the grievance line, but she refused. While I informed her that we can't advise against taking medications as directed, I gave her information about BP, and she stated that she would skip her dose tonight if her BP were too low the next time she checked it. I advised that if at any point she started to feel symptoms associated with hypotension, she should go to ED. Pt verbalized understanding.    Reason for Disposition   [1] Fall in systolic BP > 20 mm Hg from normal AND [2] NOT dizzy, lightheaded, or weak    Protocols used: LOW BLOOD PRESSURE-A-AH

## 2020-02-26 NOTE — TELEPHONE ENCOUNTER
Spoke with patient. Informed patient to stop Metoprolol per Dr Dixon. And to see a cardiologist if she has any palpitations. Pt verbalizes understanding and states that she has an upcoming appointment with Cardiology, Dr Cortez, next week.

## 2020-02-26 NOTE — TELEPHONE ENCOUNTER
Tell patient to stop Metoprolol.  If she has palpitations without the medication, we can get her in with the cardiologist.  However, her BP is too low at baseline to be taking this medication.

## 2020-03-04 PROBLEM — R00.2 PALPITATIONS: Status: ACTIVE | Noted: 2020-03-04

## 2020-03-09 ENCOUNTER — OFFICE VISIT (OUTPATIENT)
Dept: CARDIOLOGY | Facility: CLINIC | Age: 60
End: 2020-03-09
Payer: COMMERCIAL

## 2020-03-09 VITALS
HEART RATE: 82 BPM | SYSTOLIC BLOOD PRESSURE: 132 MMHG | HEIGHT: 62 IN | WEIGHT: 118.63 LBS | DIASTOLIC BLOOD PRESSURE: 90 MMHG | BODY MASS INDEX: 21.83 KG/M2

## 2020-03-09 DIAGNOSIS — R00.2 PALPITATIONS: Primary | ICD-10-CM

## 2020-03-09 DIAGNOSIS — E89.0 POSTABLATIVE HYPOTHYROIDISM: ICD-10-CM

## 2020-03-09 DIAGNOSIS — I10 ESSENTIAL HYPERTENSION: ICD-10-CM

## 2020-03-09 DIAGNOSIS — R06.02 SOB (SHORTNESS OF BREATH): ICD-10-CM

## 2020-03-09 PROCEDURE — 3075F SYST BP GE 130 - 139MM HG: CPT | Mod: CPTII,S$GLB,, | Performed by: INTERNAL MEDICINE

## 2020-03-09 PROCEDURE — 99204 PR OFFICE/OUTPT VISIT, NEW, LEVL IV, 45-59 MIN: ICD-10-PCS | Mod: S$GLB,,, | Performed by: INTERNAL MEDICINE

## 2020-03-09 PROCEDURE — 3008F PR BODY MASS INDEX (BMI) DOCUMENTED: ICD-10-PCS | Mod: CPTII,S$GLB,, | Performed by: INTERNAL MEDICINE

## 2020-03-09 PROCEDURE — 99999 PR PBB SHADOW E&M-EST. PATIENT-LVL III: ICD-10-PCS | Mod: PBBFAC,,, | Performed by: INTERNAL MEDICINE

## 2020-03-09 PROCEDURE — 99204 OFFICE O/P NEW MOD 45 MIN: CPT | Mod: S$GLB,,, | Performed by: INTERNAL MEDICINE

## 2020-03-09 PROCEDURE — 3075F PR MOST RECENT SYSTOLIC BLOOD PRESS GE 130-139MM HG: ICD-10-PCS | Mod: CPTII,S$GLB,, | Performed by: INTERNAL MEDICINE

## 2020-03-09 PROCEDURE — 3080F DIAST BP >= 90 MM HG: CPT | Mod: CPTII,S$GLB,, | Performed by: INTERNAL MEDICINE

## 2020-03-09 PROCEDURE — 99999 PR PBB SHADOW E&M-EST. PATIENT-LVL III: CPT | Mod: PBBFAC,,, | Performed by: INTERNAL MEDICINE

## 2020-03-09 PROCEDURE — 3080F PR MOST RECENT DIASTOLIC BLOOD PRESSURE >= 90 MM HG: ICD-10-PCS | Mod: CPTII,S$GLB,, | Performed by: INTERNAL MEDICINE

## 2020-03-09 PROCEDURE — 3008F BODY MASS INDEX DOCD: CPT | Mod: CPTII,S$GLB,, | Performed by: INTERNAL MEDICINE

## 2020-03-09 RX ORDER — ACETAMINOPHEN AND PHENYLEPHRINE HCL 325; 5 MG/1; MG/1
TABLET ORAL DAILY
COMMUNITY
End: 2021-10-14

## 2020-03-09 NOTE — PROGRESS NOTES
Subjective:   Patient ID:  Sarahy Ma is a 59 y.o. female who presents for [facility] Hospital Follow Up      HPI: In the past 3 months 2 visits to ER with shortness of breath and palpitations.  Pulmonary work up negative.  Patient has history of tongue cancer and is s/p chemo and XRT. She also carries a diagnosis of UC.  SHE had ADRIANA+BSO in 2018. For about a year on very low dose of LIsinopril, but thinks since then has occasional olfactory experiences (smells onion while c/o shortness of breath).  Very reluctant to make any changes in the mediation even if she feels it lowers her blood pressure too much.    ECG-ST, otherwise normal    Past Medical History:   Diagnosis Date    Postablative hypothyroidism     secondary to the radiation treatment for tongue cancer - treated by oncologist    Tongue cancer     Ulcerative colitis     Treated by Dr. Tobias       Past Surgical History:   Procedure Laterality Date     SECTION      CHOLECYSTECTOMY  2016    removed    CHOLECYSTECTOMY  2016    COLONOSCOPY      Dr. Tobias    feeding tube      groin surgery  2017    swollen lump node    HYSTERECTOMY      OOPHORECTOMY      PORTACATH PLACEMENT      TONSILLECTOMY      TUBAL LIGATION         Social History     Socioeconomic History    Marital status:      Spouse name: Not on file    Number of children: Not on file    Years of education: Not on file    Highest education level: Not on file   Occupational History    Not on file   Social Needs    Financial resource strain: Not on file    Food insecurity:     Worry: Not on file     Inability: Not on file    Transportation needs:     Medical: Not on file     Non-medical: Not on file   Tobacco Use    Smoking status: Former Smoker    Smokeless tobacco: Never Used   Substance and Sexual Activity    Alcohol use: Yes     Comment: occasionally    Drug use: No    Sexual activity: Not on file   Lifestyle    Physical activity:      Days per week: Not on file     Minutes per session: Not on file    Stress: Not on file   Relationships    Social connections:     Talks on phone: Not on file     Gets together: Not on file     Attends Sikh service: Not on file     Active member of club or organization: Not on file     Attends meetings of clubs or organizations: Not on file     Relationship status: Not on file   Other Topics Concern    Not on file   Social History Narrative    Not on file       Review of patient's allergies indicates:   Allergen Reactions    Amoxicillin      Other reaction(s): fever blisters    Nsaids (non-steroidal anti-inflammatory drug)      Other reaction(s): nausea, stomach issues       Lab Results   Component Value Date     02/22/2020    K 3.6 02/22/2020     02/22/2020    CO2 24 02/22/2020    BUN 14 02/22/2020    CREATININE 0.82 02/22/2020     (H) 02/22/2020    AST 22 02/22/2020    ALT 15 02/22/2020    ALBUMIN 4.9 02/22/2020    PROT 8.4 02/22/2020    BILITOT 0.6 02/22/2020    WBC 8.88 02/22/2020    HGB 15.0 02/22/2020    HCT 43.8 02/22/2020    MCV 90 02/22/2020     02/22/2020    TSH 4.430 (H) 02/22/2020    CHOL 214 (H) 01/31/2020    HDL 73 01/31/2020    LDLCALC 119.6 01/31/2020    TRIG 107 01/31/2020       Review of Systems   Constitution: Negative.   HENT: Negative.    Eyes: Negative.    Cardiovascular: Positive for dyspnea on exertion and palpitations. Negative for chest pain, claudication, irregular heartbeat, leg swelling, near-syncope and syncope.   Respiratory: Negative.  Negative for cough, shortness of breath, snoring and wheezing.    Endocrine: Negative.  Negative for cold intolerance, heat intolerance, polydipsia, polyphagia and polyuria.   Skin: Negative.    Musculoskeletal: Positive for arthritis.   Gastrointestinal: Positive for abdominal pain and heartburn.   Genitourinary: Negative.    Neurological: Positive for headaches and light-headedness.   Psychiatric/Behavioral:  "Negative.        Objective:   Physical Exam   Constitutional: She is oriented to person, place, and time. She appears well-developed and well-nourished.   BP (!) 132/90   Pulse 82   Ht 5' 2" (1.575 m)   Wt 53.8 kg (118 lb 9.7 oz)   BMI 21.69 kg/m²      HENT:   Head: Normocephalic.   Eyes: Pupils are equal, round, and reactive to light.   Neck: Normal range of motion. Neck supple. Carotid bruit is not present. No thyromegaly present.   Cardiovascular: Normal rate, regular rhythm, normal heart sounds and intact distal pulses. Exam reveals no gallop and no friction rub.   No murmur heard.  Pulses:       Carotid pulses are 2+ on the right side, and 2+ on the left side.       Radial pulses are 2+ on the right side, and 2+ on the left side.        Femoral pulses are 2+ on the right side, and 2+ on the left side.       Popliteal pulses are 2+ on the right side, and 2+ on the left side.        Dorsalis pedis pulses are 2+ on the right side, and 2+ on the left side.        Posterior tibial pulses are 2+ on the right side, and 2+ on the left side.   Pulmonary/Chest: Effort normal and breath sounds normal. No respiratory distress. She has no wheezes. She has no rales. She exhibits no tenderness.   Abdominal: Soft. Bowel sounds are normal.   Musculoskeletal: Normal range of motion. She exhibits no edema.   Neurological: She is alert and oriented to person, place, and time.   Skin: Skin is warm and dry.   Psychiatric: She has a normal mood and affect.   Nursing note and vitals reviewed.      Current Outpatient Medications   Medication Sig    albuterol (PROVENTIL/VENTOLIN HFA) 90 mcg/actuation inhaler Inhale 1-2 puffs into the lungs every 6 (six) hours as needed. Rescue    cholecalciferol, vitamin D3, 5,000 unit TbDL Take by mouth once daily.    conjugated estrogens (PREMARIN) vaginal cream Insert 0.5 gm vaginally twice a week as directed    levothyroxine (SYNTHROID) 75 MCG tablet Take 1 tablet (75 mcg total) by mouth " before breakfast.    lisinopril (PRINIVIL,ZESTRIL) 5 MG tablet Take 1 tablet (5 mg total) by mouth once daily.    mesalamine (LIALDA) 1.2 gram TbEC Take 2.4 g by mouth once daily.     No current facility-administered medications for this visit.        Assessment and Plan:     Problem List Items Addressed This Visit        Cardiology Problems    Essential hypertension    Relevant Orders    Holter monitor - 48 hour    Echo Color Flow Doppler? Yes       Other    Postablative hypothyroidism    Relevant Orders    Holter monitor - 48 hour    Echo Color Flow Doppler? Yes    SOB (shortness of breath)    Relevant Orders    Holter monitor - 48 hour    Echo Color Flow Doppler? Yes    Palpitations - Primary    Relevant Orders    Holter monitor - 48 hour    Echo Color Flow Doppler? Yes          Patient's Medications   New Prescriptions    No medications on file   Previous Medications    ALBUTEROL (PROVENTIL/VENTOLIN HFA) 90 MCG/ACTUATION INHALER    Inhale 1-2 puffs into the lungs every 6 (six) hours as needed. Rescue    CHOLECALCIFEROL, VITAMIN D3, 5,000 UNIT TBDL    Take by mouth once daily.    CONJUGATED ESTROGENS (PREMARIN) VAGINAL CREAM    Insert 0.5 gm vaginally twice a week as directed    LEVOTHYROXINE (SYNTHROID) 75 MCG TABLET    Take 1 tablet (75 mcg total) by mouth before breakfast.    LISINOPRIL (PRINIVIL,ZESTRIL) 5 MG TABLET    Take 1 tablet (5 mg total) by mouth once daily.    MESALAMINE (LIALDA) 1.2 GRAM TBEC    Take 2.4 g by mouth once daily.   Modified Medications    No medications on file   Discontinued Medications    CEPHALEXIN (KEFLEX) 500 MG CAPSULE    Take 1 capsule by mouth three times a day.    DICLOFENAC SODIUM (VOLTAREN) 1 % GEL    APPLY 2 GRAMS 4 TIMES A DAY    ESTRADIOL (ESTRACE) 0.01 % (0.1 MG/GRAM) VAGINAL CREAM    Place 1 gram vaginally every Mon, Wed, Fri.    METOPROLOL TARTRATE (LOPRESSOR) 25 MG TABLET    Take 1 tablet (25 mg total) by mouth 2 (two) times daily.     Reviewed ER visits and  reports.  We discussed reports about Lisinopril. I offered change to HCTZ 12.5mg daily and monitor BP. Patient declined.  Symptoms are atypical.  ECG shows ST, no other changes.  Schedule for Holter and CFD and discuss results.  If OK follow up with PCP and cardiology as needed.  Recommend low salt diet, Yoga classes.  Thank you for allowing me to participate in the care of this patient. Please do not hesitate to contact me with any questions or concerns.

## 2020-03-10 ENCOUNTER — PATIENT MESSAGE (OUTPATIENT)
Dept: CARDIOLOGY | Facility: CLINIC | Age: 60
End: 2020-03-10

## 2020-03-10 DIAGNOSIS — I10 ESSENTIAL HYPERTENSION: Primary | ICD-10-CM

## 2020-03-10 RX ORDER — HYDROCHLOROTHIAZIDE 25 MG/1
25 TABLET ORAL DAILY
COMMUNITY
End: 2020-03-10 | Stop reason: SDUPTHER

## 2020-03-10 RX ORDER — HYDROCHLOROTHIAZIDE 25 MG/1
25 TABLET ORAL DAILY
Qty: 30 TABLET | Refills: 6 | Status: SHIPPED | OUTPATIENT
Start: 2020-03-10 | End: 2020-12-10

## 2020-03-11 ENCOUNTER — HOSPITAL ENCOUNTER (OUTPATIENT)
Dept: CARDIOLOGY | Facility: CLINIC | Age: 60
Discharge: HOME | End: 2020-03-11
Attending: INTERNAL MEDICINE
Payer: COMMERCIAL

## 2020-03-11 ENCOUNTER — PATIENT MESSAGE (OUTPATIENT)
Dept: CARDIOLOGY | Facility: CLINIC | Age: 60
End: 2020-03-11

## 2020-03-11 VITALS
SYSTOLIC BLOOD PRESSURE: 140 MMHG | HEART RATE: 76 BPM | BODY MASS INDEX: 21.71 KG/M2 | WEIGHT: 118 LBS | DIASTOLIC BLOOD PRESSURE: 80 MMHG | HEIGHT: 62 IN

## 2020-03-11 LAB
ASCENDING AORTA: 3.28 CM
AV INDEX (PROSTH): 0.82
AV MEAN GRADIENT: 5 MMHG
AV PEAK GRADIENT: 7 MMHG
AV VALVE AREA: 2.69 CM2
AV VELOCITY RATIO: 0.92
BSA FOR ECHO PROCEDURE: 1.53 M2
CV ECHO LV RWT: 0.32 CM
DOP CALC AO PEAK VEL: 1.36 M/S
DOP CALC AO VTI: 29.12 CM
DOP CALC LVOT AREA: 3.3 CM2
DOP CALC LVOT DIAMETER: 2.05 CM
DOP CALC LVOT PEAK VEL: 1.25 M/S
DOP CALC LVOT STROKE VOLUME: 78.35 CM3
DOP CALCLVOT PEAK VEL VTI: 23.75 CM
E WAVE DECELERATION TIME: 302.91 MSEC
E/A RATIO: 0.94
E/E' RATIO: 6.1 M/S
ECHO LV POSTERIOR WALL: 0.69 CM (ref 0.6–1.1)
FRACTIONAL SHORTENING: 40 % (ref 28–44)
INTERVENTRICULAR SEPTUM: 0.72 CM (ref 0.6–1.1)
LA MAJOR: 3.75 CM
LA MINOR: 3.83 CM
LA WIDTH: 3.14 CM
LEFT ATRIUM SIZE: 2.97 CM
LEFT ATRIUM VOLUME INDEX: 19.7 ML/M2
LEFT ATRIUM VOLUME: 30.04 CM3
LEFT INTERNAL DIMENSION IN SYSTOLE: 2.56 CM (ref 2.1–4)
LEFT VENTRICLE DIASTOLIC VOLUME INDEX: 53.96 ML/M2
LEFT VENTRICLE DIASTOLIC VOLUME: 82.43 ML
LEFT VENTRICLE MASS INDEX: 58 G/M2
LEFT VENTRICLE SYSTOLIC VOLUME INDEX: 15.5 ML/M2
LEFT VENTRICLE SYSTOLIC VOLUME: 23.62 ML
LEFT VENTRICULAR INTERNAL DIMENSION IN DIASTOLE: 4.29 CM (ref 3.5–6)
LEFT VENTRICULAR MASS: 88.99 G
LV LATERAL E/E' RATIO: 5.08 M/S
LV SEPTAL E/E' RATIO: 7.63 M/S
MV PEAK A VEL: 0.65 M/S
MV PEAK E VEL: 0.61 M/S
PISA TR MAX VEL: 2.42 M/S
PULM VEIN S/D RATIO: 1.82
PV PEAK D VEL: 0.33 M/S
PV PEAK S VEL: 0.6 M/S
RA MAJOR: 3.7 CM
RA PRESSURE: 8 MMHG
RA WIDTH: 2.74 CM
RIGHT VENTRICULAR END-DIASTOLIC DIMENSION: 2.89 CM
RV TISSUE DOPPLER FREE WALL SYSTOLIC VELOCITY 1 (APICAL 4 CHAMBER VIEW): 15.47 CM/S
SINUS: 2.81 CM
STJ: 3.01 CM
TDI LATERAL: 0.12 M/S
TDI SEPTAL: 0.08 M/S
TDI: 0.1 M/S
TR MAX PG: 23 MMHG
TRICUSPID ANNULAR PLANE SYSTOLIC EXCURSION: 2.14 CM
TV REST PULMONARY ARTERY PRESSURE: 31 MMHG

## 2020-03-11 PROCEDURE — 93306 ECHO (CUPID ONLY): ICD-10-PCS | Mod: 26,,, | Performed by: INTERNAL MEDICINE

## 2020-03-11 PROCEDURE — 93306 TTE W/DOPPLER COMPLETE: CPT

## 2020-03-11 PROCEDURE — 93306 TTE W/DOPPLER COMPLETE: CPT | Mod: 26,,, | Performed by: INTERNAL MEDICINE

## 2020-03-11 NOTE — TELEPHONE ENCOUNTER
Spoke w/pt and rescheduled echo for today at 3:15pm at [facility]. Pt is still scheduled for Holter on 3/17/2020 because there wasn't a sooner appointment for the Holter here or at [facility].

## 2020-03-12 ENCOUNTER — TELEPHONE (OUTPATIENT)
Dept: CARDIOLOGY | Facility: CLINIC | Age: 60
End: 2020-03-12

## 2020-03-12 NOTE — TELEPHONE ENCOUNTER
Pt notified, verbalized understanding. Pt stated that she has will start taking HCTZ on tomorrow. Pt advised again of Dr. Cortez's instructions from 3/10/2020 (We can try Hctz 25 mg ( start with half- 12.5mg) daily and dc/ Lisinopril.  Patient should monitor BP and if it is still elevated after 2-3 days she can increase the dose to a full tablet ( 25 mg daily).) Pt verbalized understanding. Pt stated that she will call us on Monday w/her bp readings.

## 2020-03-12 NOTE — TELEPHONE ENCOUNTER
----- Message from Christine Cortez MD sent at 3/12/2020 11:59 AM CDT -----  Please inform the patient that her heart echo was normal Great news. How is she tolerating new medication and is BP better?

## 2020-03-20 ENCOUNTER — TELEPHONE (OUTPATIENT)
Dept: CARDIOLOGY | Facility: CLINIC | Age: 60
End: 2020-03-20

## 2020-03-20 NOTE — TELEPHONE ENCOUNTER
Pt notified, verbalized understanding. Pt stated that she started w/half pill on 3/11/2020 and her bp was still high then on Sunday her bp was high so she took a full pill. Pt stated that she was frantic when she talked to me the earlier and forgot to tell me that she initially started w/half of pill on 3/11/2020. Pt stated that she has Xanax and wants to know if she should take it.  Please advise.

## 2020-03-20 NOTE — TELEPHONE ENCOUNTER
Pt called stating that she started on HCTZ 25mg on Monday and has noticed that her bp gets high in the afternoon. Pt stated that she starts to feel shaky and her heart starts to race. Pt stated that she has not been resting 5 minutes before taking her bp and has has not added any salt to her food, but hasn't been watching the salt content in her food either. Pt stated that she saw her allergy doctor yesterday and was advised that she can take Allegra w/o the D. Please review bp readings and advise.  3/18/2020 118/91 P82 30 minutes after meds  3/19/2020 135/92 [id number]  3/20/2020 159/109 P123

## 2020-03-23 NOTE — TELEPHONE ENCOUNTER
Pt notified, verbalized understanding. Pt stated that she went to the ER on Friday because her bp was 169/104. Pt stated that she was told that her bp was high due to anxiety. Pt stated that she will call in the middle of the week if her bp is not better.

## 2020-03-24 ENCOUNTER — PATIENT MESSAGE (OUTPATIENT)
Dept: FAMILY MEDICINE | Facility: CLINIC | Age: 60
End: 2020-03-24

## 2020-03-25 ENCOUNTER — TELEPHONE (OUTPATIENT)
Dept: FAMILY MEDICINE | Facility: CLINIC | Age: 60
End: 2020-03-25

## 2020-03-25 RX ORDER — ALPRAZOLAM 0.25 MG/1
0.25 TABLET ORAL DAILY PRN
Qty: 30 TABLET | Refills: 1 | Status: SHIPPED | OUTPATIENT
Start: 2020-03-25 | End: 2020-12-10

## 2020-03-25 NOTE — TELEPHONE ENCOUNTER
Patient unable to connect for video visit, spoke with her by phone regarding BP and possible anxiety.    Lisinopril was stopped due to possible SE of SOB, put on HCTZ.  Now on 25 mg daily/  Was seeing cardiology Dr. Sandoval.  Seen earlier this month, had EKG which looked good, Holter pending.    BP started going up, thought she was also having anxiety.  Has been taking 1/2 tablet of Xanax 0.25 mg when she would get anxious which helped a lot.    Took Allegra after seeing allergist Dr. Nolan, BP went álvaro high afterward.  Went back to ED, BP was 169/114 at triage.  Now taking zyrtec, BP has not gone up with that.  Has been 118/83, 115/84.      Only taking the Xanax when needed, last took Monday.     For now, continue HCTZ 25 mg daily.  For anxiety, can continue 1/2 tablet of Xanax 0.25 mg as needed.  If requirements for xanax are increasing, will consider SSRI.  Patient aware of plan.

## 2020-03-26 ENCOUNTER — PATIENT MESSAGE (OUTPATIENT)
Dept: FAMILY MEDICINE | Facility: CLINIC | Age: 60
End: 2020-03-26

## 2020-03-30 ENCOUNTER — PATIENT MESSAGE (OUTPATIENT)
Dept: FAMILY MEDICINE | Facility: CLINIC | Age: 60
End: 2020-03-30

## 2020-03-30 RX ORDER — AMLODIPINE BESYLATE 5 MG/1
5 TABLET ORAL DAILY
Qty: 30 TABLET | Refills: 11 | Status: SHIPPED | OUTPATIENT
Start: 2020-03-30 | End: 2020-04-16

## 2020-04-06 ENCOUNTER — PATIENT MESSAGE (OUTPATIENT)
Dept: FAMILY MEDICINE | Facility: CLINIC | Age: 60
End: 2020-04-06

## 2020-04-07 ENCOUNTER — PATIENT MESSAGE (OUTPATIENT)
Dept: FAMILY MEDICINE | Facility: CLINIC | Age: 60
End: 2020-04-07

## 2020-04-10 ENCOUNTER — PATIENT MESSAGE (OUTPATIENT)
Dept: FAMILY MEDICINE | Facility: CLINIC | Age: 60
End: 2020-04-10

## 2020-04-13 ENCOUNTER — OFFICE VISIT (OUTPATIENT)
Dept: FAMILY MEDICINE | Facility: CLINIC | Age: 60
End: 2020-04-13
Payer: COMMERCIAL

## 2020-04-13 DIAGNOSIS — R00.2 PALPITATIONS: Primary | ICD-10-CM

## 2020-04-13 DIAGNOSIS — I10 ESSENTIAL HYPERTENSION: ICD-10-CM

## 2020-04-13 PROCEDURE — 99214 PR OFFICE/OUTPT VISIT, EST, LEVL IV, 30-39 MIN: ICD-10-PCS | Mod: 95,,, | Performed by: INTERNAL MEDICINE

## 2020-04-13 PROCEDURE — 99214 OFFICE O/P EST MOD 30 MIN: CPT | Mod: 95,,, | Performed by: INTERNAL MEDICINE

## 2020-04-13 NOTE — PROGRESS NOTES
The patient location is: home  The chief complaint leading to consultation is: anxiety  Visit type: Virtual visit with synchronous audio and video  Total time spent with patient: 15 minutes  Each patient to whom he or she provides medical services by telemedicine is:  (1) informed of the relationship between the physician and patient and the respective role of any other health care provider with respect to management of the patient; and (2) notified that he or she may decline to receive medical services by telemedicine and may withdraw from such care at any time.    [facility]    Chief Complaint      Chief Complaint   Patient presents with    Palpitations     History of Present Illness      Sarahy Ma is a 59 y.o. female who presents today for palpitations.  Patient comes to appointment via virtual visit.    Patient does not think her symptoms are related to anxiety.  Had episode while working the yard, had similar symptoms to what she had when she was dehydrated going through chemo.  Is down to 110 pounds since starting diuretic.  In 5/2020, went to hospital during prep for c-scope and potassium was low.  Had similar symptoms to what she is having now.  Feels palpitations/flutter.  Feels like her symptoms are making her anxious, not the other way around.  Problem List Items Addressed This Visit     Palpitations - Primary    Relevant Orders    CBC auto differential    Comprehensive metabolic panel    Essential hypertension          Health Maintenance   Topic Date Due    Mammogram  02/17/2022    Colonoscopy  07/14/2024    Lipid Panel  01/31/2025    TETANUS VACCINE  06/02/2027    Hepatitis C Screening  Completed       Past Medical History:   Diagnosis Date    Postablative hypothyroidism     secondary to the radiation treatment for tongue cancer - treated by oncologist    Tongue cancer     Ulcerative colitis     Treated by Dr. Tobias       Past Surgical History:   Procedure  Laterality Date     SECTION      CHOLECYSTECTOMY  2016    removed    CHOLECYSTECTOMY  2016    COLONOSCOPY      Dr. Tobias    feeding tube      groin surgery  2017    swollen lump node    HYSTERECTOMY      OOPHORECTOMY      PORTACATH PLACEMENT      TONSILLECTOMY      TUBAL LIGATION         family history includes Diabetes in her father; Heart attack in her father; Heart disease in her father; Heart failure in her father; Hyperlipidemia in her father; Hypertension in her father and mother; Thyroid disease in her brother, brother, sister, and sister.    Social History     Tobacco Use    Smoking status: Former Smoker    Smokeless tobacco: Never Used   Substance Use Topics    Alcohol use: Not Currently     Frequency: Monthly or less     Drinks per session: Patient refused     Binge frequency: Never     Comment: occasionally    Drug use: No       Review of Systems   Constitutional: Negative for chills and fever.   HENT: Negative for congestion and sore throat.    Eyes: Negative for blurred vision and discharge.   Respiratory: Negative for cough and shortness of breath.    Cardiovascular: Positive for palpitations. Negative for chest pain.   Gastrointestinal: Negative for constipation, diarrhea, nausea and vomiting.   Genitourinary: Negative for dysuria and hematuria.   Musculoskeletal: Negative for falls and myalgias.   Skin: Negative for itching and rash.   Neurological: Negative for dizziness and headaches.        Outpatient Encounter Medications as of 2020   Medication Sig Dispense Refill    albuterol (PROVENTIL/VENTOLIN HFA) 90 mcg/actuation inhaler Inhale 1-2 puffs into the lungs every 6 (six) hours as needed. Rescue 1 Inhaler 0    ALPRAZolam (XANAX) 0.25 MG tablet Take 1 tablet (0.25 mg total) by mouth daily as needed for Anxiety. 30 tablet 1    amLODIPine (NORVASC) 5 MG tablet Take 1 tablet (5 mg total) by mouth once daily. 30 tablet 11    cholecalciferol, vitamin D3,  5,000 unit TbDL Take by mouth once daily.      conjugated estrogens (PREMARIN) vaginal cream Insert 0.5 gm vaginally twice a week as directed 30 g 11    hydroCHLOROthiazide (HYDRODIURIL) 25 MG tablet Take 1 tablet (25 mg total) by mouth once daily. 30 tablet 6    hydroxyzine HCL (ATARAX) 25 MG tablet Take 1 tablet (25 mg total) by mouth 3 (three) times daily as needed for Anxiety. 20 tablet 0    levothyroxine (SYNTHROID) 75 MCG tablet Take 1 tablet (75 mcg total) by mouth before breakfast. 30 tablet 11    mesalamine (LIALDA) 1.2 gram TbEC Take 2.4 g by mouth once daily.       No facility-administered encounter medications on file as of 4/13/2020.         Review of patient's allergies indicates:   Allergen Reactions    Amoxicillin      Other reaction(s): fever blisters    Nsaids (non-steroidal anti-inflammatory drug)      Other reaction(s): nausea, stomach issues       Physical Exam       ] No VS, virtual visit    Physical Exam   Constitutional: She is oriented to person, place, and time. She appears well-developed and well-nourished. No distress.   HENT:   Head: Normocephalic and atraumatic.   Pulmonary/Chest: Effort normal. No respiratory distress.   Musculoskeletal: Normal range of motion. She exhibits no deformity.   Neurological: She is alert and oriented to person, place, and time. She exhibits normal muscle tone. Coordination normal.   Skin: Skin is warm. No rash noted. She is not diaphoretic. No erythema. No pallor.   Psychiatric: She has a normal mood and affect. Her behavior is normal. Judgment and thought content normal.        Laboratory:  CBC:  Recent Labs   Lab Result Units 01/31/20  0806 02/22/20 2006   WBC K/uL 3.89* 8.88   RBC M/uL 4.33 4.85   Hemoglobin g/dL 13.0 15.0   Hematocrit % 40.1 43.8   Platelets K/uL 202 240   Mean Corpuscular Volume fL 93 90   Mean Corpuscular Hemoglobin pg 30.0 30.9   Mean Corpuscular Hemoglobin Conc g/dL 32.4 34.2     CMP:  Recent Labs   Lab Result Units  01/31/20  0806 02/22/20 2006   Glucose mg/dL 83 130*   Calcium mg/dL 9.5 9.7   Albumin g/dL 4.0 4.9   Total Protein g/dL 6.8 8.4   Sodium mmol/L 139 140   Potassium mmol/L 3.9 3.6   CO2 mmol/L 28 24   Chloride mmol/L 105 101   BUN, Bld mg/dL 12 14   Alkaline Phosphatase U/L 67 129*   ALT U/L 16 15   AST U/L 17 22   Total Bilirubin mg/dL 0.6 0.6     URINALYSIS:  No results for input(s): COLORU, CLARITYU, SPECGRAV, PHUR, PROTEINUA, GLUCOSEU, BILIRUBINCON, BLOODU, WBCU, RBCU, BACTERIA, MUCUS, NITRITE, LEUKOCYTESUR, UROBILINOGEN, HYALINECASTS in the last 2160 hours.   LIPIDS:  Recent Labs   Lab Result Units 01/31/20  0806 02/22/20 2006   TSH uIU/mL 1.938 4.430*   HDL mg/dL 73  --    Cholesterol mg/dL 214*  --    Triglycerides mg/dL 107  --    LDL Cholesterol mg/dL 119.6  --    Hdl/Cholesterol Ratio % 34.1  --    Non-HDL Cholesterol mg/dL 141  --    Total Cholesterol/HDL Ratio  2.9  --      TSH:  Recent Labs   Lab Result Units 01/31/20  0806 02/22/20 2006   TSH uIU/mL 1.938 4.430*     A1C:  No results for input(s): HGBA1C in the last 2160 hours.    Radiology:  No imaging on file    Assessment/Plan     Sarahy Ma is a 59 y.o.female with:    1. Palpitations  - CBC auto differential; Future  - Comprehensive metabolic panel; Future    2. Essential hypertension    -Stop HCTZ, continue Norvasc 10 mg daily  -Increase fluid/electrolyte intake  -Check CBC, CMP   -Continue current medications and maintain follow up with specialists.  Return to clinic JANEEN Dixon MD  [facility] [place]

## 2020-04-16 ENCOUNTER — PATIENT MESSAGE (OUTPATIENT)
Dept: FAMILY MEDICINE | Facility: CLINIC | Age: 60
End: 2020-04-16

## 2020-04-16 RX ORDER — AMLODIPINE BESYLATE 10 MG/1
10 TABLET ORAL DAILY
Qty: 90 TABLET | Refills: 3 | Status: SHIPPED | OUTPATIENT
Start: 2020-04-16 | End: 2021-04-11 | Stop reason: SDUPTHER

## 2020-04-23 ENCOUNTER — PATIENT MESSAGE (OUTPATIENT)
Dept: FAMILY MEDICINE | Facility: CLINIC | Age: 60
End: 2020-04-23

## 2020-06-02 ENCOUNTER — PATIENT MESSAGE (OUTPATIENT)
Dept: FAMILY MEDICINE | Facility: CLINIC | Age: 60
End: 2020-06-02

## 2020-06-02 RX ORDER — LEVOTHYROXINE SODIUM 75 UG/1
75 TABLET ORAL
Qty: 90 TABLET | Refills: 3 | Status: SHIPPED | OUTPATIENT
Start: 2020-06-02 | End: 2021-06-02 | Stop reason: SDUPTHER

## 2020-06-18 ENCOUNTER — PATIENT MESSAGE (OUTPATIENT)
Dept: FAMILY MEDICINE | Facility: CLINIC | Age: 60
End: 2020-06-18

## 2020-06-18 DIAGNOSIS — K51.80 OTHER ULCERATIVE COLITIS WITHOUT COMPLICATION: Primary | ICD-10-CM

## 2020-09-18 ENCOUNTER — IMMUNIZATION (OUTPATIENT)
Dept: PHARMACY | Facility: CLINIC | Age: 60
End: 2020-09-18
Payer: COMMERCIAL

## 2020-10-29 ENCOUNTER — OFFICE VISIT (OUTPATIENT)
Dept: FAMILY MEDICINE | Facility: CLINIC | Age: 60
End: 2020-10-29
Payer: COMMERCIAL

## 2020-10-29 VITALS
HEIGHT: 62 IN | HEART RATE: 86 BPM | BODY MASS INDEX: 21.23 KG/M2 | DIASTOLIC BLOOD PRESSURE: 80 MMHG | WEIGHT: 115.38 LBS | OXYGEN SATURATION: 98 % | SYSTOLIC BLOOD PRESSURE: 106 MMHG

## 2020-10-29 DIAGNOSIS — R51.9 BILATERAL HEADACHES: ICD-10-CM

## 2020-10-29 DIAGNOSIS — R73.01 IMPAIRED FASTING GLUCOSE: ICD-10-CM

## 2020-10-29 DIAGNOSIS — Z85.810 HISTORY OF TONGUE CANCER: ICD-10-CM

## 2020-10-29 DIAGNOSIS — E89.0 POSTABLATIVE HYPOTHYROIDISM: Primary | ICD-10-CM

## 2020-10-29 DIAGNOSIS — Z00.00 ANNUAL PHYSICAL EXAM: ICD-10-CM

## 2020-10-29 DIAGNOSIS — M25.50 ARTHRALGIA, UNSPECIFIED JOINT: ICD-10-CM

## 2020-10-29 DIAGNOSIS — H92.21 EAR BLEEDING, RIGHT: ICD-10-CM

## 2020-10-29 DIAGNOSIS — K51.80 OTHER ULCERATIVE COLITIS WITHOUT COMPLICATION: ICD-10-CM

## 2020-10-29 DIAGNOSIS — I10 ESSENTIAL HYPERTENSION: ICD-10-CM

## 2020-10-29 PROCEDURE — 3074F SYST BP LT 130 MM HG: CPT | Mod: CPTII,S$GLB,, | Performed by: INTERNAL MEDICINE

## 2020-10-29 PROCEDURE — 3008F BODY MASS INDEX DOCD: CPT | Mod: CPTII,S$GLB,, | Performed by: INTERNAL MEDICINE

## 2020-10-29 PROCEDURE — 99214 PR OFFICE/OUTPT VISIT, EST, LEVL IV, 30-39 MIN: ICD-10-PCS | Mod: S$GLB,,, | Performed by: INTERNAL MEDICINE

## 2020-10-29 PROCEDURE — 3074F PR MOST RECENT SYSTOLIC BLOOD PRESSURE < 130 MM HG: ICD-10-PCS | Mod: CPTII,S$GLB,, | Performed by: INTERNAL MEDICINE

## 2020-10-29 PROCEDURE — 99999 PR PBB SHADOW E&M-EST. PATIENT-LVL III: ICD-10-PCS | Mod: PBBFAC,,, | Performed by: INTERNAL MEDICINE

## 2020-10-29 PROCEDURE — 99999 PR PBB SHADOW E&M-EST. PATIENT-LVL III: CPT | Mod: PBBFAC,,, | Performed by: INTERNAL MEDICINE

## 2020-10-29 PROCEDURE — 3079F PR MOST RECENT DIASTOLIC BLOOD PRESSURE 80-89 MM HG: ICD-10-PCS | Mod: CPTII,S$GLB,, | Performed by: INTERNAL MEDICINE

## 2020-10-29 PROCEDURE — 3008F PR BODY MASS INDEX (BMI) DOCUMENTED: ICD-10-PCS | Mod: CPTII,S$GLB,, | Performed by: INTERNAL MEDICINE

## 2020-10-29 PROCEDURE — 99214 OFFICE O/P EST MOD 30 MIN: CPT | Mod: S$GLB,,, | Performed by: INTERNAL MEDICINE

## 2020-10-29 PROCEDURE — 3079F DIAST BP 80-89 MM HG: CPT | Mod: CPTII,S$GLB,, | Performed by: INTERNAL MEDICINE

## 2020-10-29 NOTE — PROGRESS NOTES
[facility] Note    Chief Complaint      Chief Complaint   Patient presents with    Follow-up     pt here for f/u/ pt experiencing headaches     History of Present Illness      Sarahy Ma is a 60 y.o. female who presents today for headaches.  Patient comes to appointment via virtual visit.    Started with intermittent headaches for last 2 weeks.  Woke up 1 week ago with blood draining out of right ear.  Was already planning to have tubes removed with Dr. Elias.  Saw him afterward, he tried to remove but was unable 2/2 pain.  Scheduled her to have them removed surgically next Wednesday.  No fever/chills.  Headaches are better this week, has mild one today.  Hasn't been taking anything consistently for HA's.  No more bleeding since last visit with Dr. Elias.     Has had joint pain for years, worse here lately.  Does have UC.  Problem List Items Addressed This Visit     Postablative hypothyroidism - Primary    Current Assessment & Plan     secondary to the radiation treatment for tongue cancer - treated by oncologist.  On  Levothyroxine 75 mcg daily.         Relevant Orders    TSH    T4, Free    Ulcerative colitis    Current Assessment & Plan     On mesalamine, works well for her.  Sees Dr. Toibas.  Had issues with prep 6/2019, got really dehydrated.         Relevant Orders    Sedimentation rate    C-reactive protein    DANILO Screen w/Reflex    Rheumatoid Factor    CYCLIC CITRUL PEPTIDE ANTIBODY, IGG    History of tongue cancer    Overview     Diagnosed/treated at [facility] in 2009.  Reportedly Stage 3 at time of diagnosis => treated with XRT/chemotherapy.         Current Assessment & Plan     Sees Dr. Dobbins, sees annually.  No longer needs scans.         Essential hypertension    Current Assessment & Plan     BP controlled on HCTZ 25 mg daily, no SOB/CP/HA.         Relevant Orders    Lipid Panel      Other Visit Diagnoses     Bilateral headaches        Ear bleeding, right         Impaired fasting glucose        Relevant Orders    Hemoglobin A1C    Annual physical exam        Relevant Orders    CBC Auto Differential    Comprehensive Metabolic Panel    Arthralgia, unspecified joint        Relevant Orders    DANILO Screen w/Reflex    Rheumatoid Factor    CYCLIC CITRUL PEPTIDE ANTIBODY, IGG          Health Maintenance   Topic Date Due    Mammogram  2022    Lipid Panel  2025    TETANUS VACCINE  2027    Hepatitis C Screening  Completed       Past Medical History:   Diagnosis Date    Postablative hypothyroidism     secondary to the radiation treatment for tongue cancer - treated by oncologist    Tongue cancer     Ulcerative colitis     Treated by Dr. Tobias       Past Surgical History:   Procedure Laterality Date     SECTION      CHOLECYSTECTOMY  2016    removed    CHOLECYSTECTOMY  2016    COLONOSCOPY      Dr. Tobias    feeding tube      groin surgery  2017    swollen lump node    HYSTERECTOMY      OOPHORECTOMY      PORTACATH PLACEMENT      TONSILLECTOMY      TUBAL LIGATION         family history includes Diabetes in her father; Heart attack in her father; Heart disease in her father; Heart failure in her father; Hyperlipidemia in her father; Hypertension in her father and mother; Thyroid disease in her brother, brother, sister, and sister.    Social History     Tobacco Use    Smoking status: Former Smoker    Smokeless tobacco: Never Used   Substance Use Topics    Alcohol use: Not Currently     Frequency: Monthly or less     Drinks per session: Patient refused     Binge frequency: Never     Comment: occasionally    Drug use: No       Review of Systems   Constitutional: Negative for chills and fever.   HENT: Negative for congestion, hearing loss and sore throat.    Eyes: Negative for blurred vision and discharge.   Respiratory: Negative for cough, shortness of breath and wheezing.    Cardiovascular: Positive for palpitations. Negative for  chest pain.   Gastrointestinal: Negative for blood in stool, constipation, diarrhea, nausea and vomiting.   Genitourinary: Negative for dysuria and hematuria.   Musculoskeletal: Negative for falls, myalgias and neck pain.   Skin: Negative for itching and rash.   Neurological: Negative for dizziness, weakness and headaches.   Endo/Heme/Allergies: Negative for polydipsia.        Outpatient Encounter Medications as of 10/29/2020   Medication Sig Dispense Refill    albuterol (PROVENTIL/VENTOLIN HFA) 90 mcg/actuation inhaler Inhale 1-2 puffs into the lungs every 6 (six) hours as needed. Rescue 1 Inhaler 0    amLODIPine (NORVASC) 10 MG tablet Take 1 tablet (10 mg total) by mouth once daily. 90 tablet 3    cholecalciferol, vitamin D3, 5,000 unit TbDL Take by mouth once daily.      mesalamine (LIALDA) 1.2 gram TbEC Take 2.4 g by mouth once daily.      SYNTHROID 75 mcg tablet Take 1 tablet (75 mcg total) by mouth before breakfast. 90 tablet 3    tobramycin-dexamethasone 0.3-0.1% (TOBRADEX) 0.3-0.1 % DrpS Place 2 to 3 drops into both ears daily 5 mL 0    ALPRAZolam (XANAX) 0.25 MG tablet Take 1 tablet (0.25 mg total) by mouth daily as needed for Anxiety. 30 tablet 1    conjugated estrogens (PREMARIN) vaginal cream Insert 0.5 gm vaginally twice a week as directed 30 g 11    hydroCHLOROthiazide (HYDRODIURIL) 25 MG tablet Take 1 tablet (25 mg total) by mouth once daily. 30 tablet 6    hydroxyzine HCL (ATARAX) 25 MG tablet Take 1 tablet (25 mg total) by mouth 3 (three) times daily as needed for Anxiety. 20 tablet 0     No facility-administered encounter medications on file as of 10/29/2020.         Review of patient's allergies indicates:   Allergen Reactions    Amoxicillin      Other reaction(s): fever blisters    Nsaids (non-steroidal anti-inflammatory drug)      Other reaction(s): nausea, stomach issues       Physical Exam      Vital Signs  Pulse: 86  SpO2: 98 %  BP: 106/80  BP Location: Left arm  Patient Position:  "Sitting  Pain Score:   1  Pain Loc: Head  Height and Weight  Height: 5' 2" (157.5 cm)  Weight: 52.3 kg (115 lb 6.4 oz)  BSA (Calculated - sq m): 1.51 sq meters  BMI (Calculated): 21.1  Weight in (lb) to have BMI = 25: 136.4]     Physical Exam  Constitutional:       Appearance: She is well-developed.   HENT:      Head: Normocephalic and atraumatic.      Right Ear: External ear normal.      Left Ear: External ear normal.   Eyes:      General:         Right eye: No discharge.         Left eye: No discharge.   Neck:      Musculoskeletal: Normal range of motion.      Thyroid: No thyromegaly.   Cardiovascular:      Rate and Rhythm: Normal rate and regular rhythm.      Heart sounds: Normal heart sounds. No murmur.   Pulmonary:      Effort: Pulmonary effort is normal. No respiratory distress.      Breath sounds: Normal breath sounds.   Abdominal:      General: Bowel sounds are normal. There is no distension.      Palpations: Abdomen is soft.      Tenderness: There is no abdominal tenderness.   Musculoskeletal: Normal range of motion.         General: No deformity.   Skin:     General: Skin is warm and dry.      Findings: No rash.   Neurological:      Mental Status: She is alert and oriented to person, place, and time.   Psychiatric:         Behavior: Behavior normal.          Laboratory:  CBC:  No results for input(s): WBC, RBC, HGB, HCT, PLT, MCV, MCH, MCHC in the last 2160 hours.  CMP:  No results for input(s): GLU, CALCIUM, ALBUMIN, PROT, NA, K, CO2, CL, BUN, ALKPHOS, ALT, AST, BILITOT in the last 2160 hours.    Invalid input(s): CREATININ  URINALYSIS:  No results for input(s): COLORU, CLARITYU, SPECGRAV, PHUR, PROTEINUA, GLUCOSEU, BILIRUBINCON, BLOODU, WBCU, RBCU, BACTERIA, MUCUS, NITRITE, LEUKOCYTESUR, UROBILINOGEN, HYALINECASTS in the last 2160 hours.   LIPIDS:  No results for input(s): TSH, HDL, CHOL, TRIG, LDLCALC, CHOLHDL, NONHDLCHOL, TOTALCHOLEST in the last 2160 hours.  TSH:  No results for input(s): TSH in the " last 2160 hours.  A1C:  No results for input(s): HGBA1C in the last 2160 hours.    Radiology:  No imaging on file    Assessment/Plan     Sarahy Ma is a 60 y.o.female with:    1. Bilateral headaches    2. Ear bleeding, right    3. Postablative hypothyroidism  - TSH; Future  - T4, Free; Future    4. Essential hypertension  - Lipid Panel; Future    5. History of tongue cancer    6. Other ulcerative colitis without complication  - Sedimentation rate; Future  - C-reactive protein; Future  - DANILO Screen w/Reflex; Future  - Rheumatoid Factor; Future  - CYCLIC CITRUL PEPTIDE ANTIBODY, IGG; Future    7. Impaired fasting glucose  - Hemoglobin A1C; Future    8. Annual physical exam  - CBC Auto Differential; Future  - Comprehensive Metabolic Panel; Future    9. Arthralgia, unspecified joint  - DANILO Screen w/Reflex; Future  - Rheumatoid Factor; Future  - CYCLIC CITRUL PEPTIDE ANTIBODY, IGG; Future     -will follow up with Dr. Elias for tube removal, check back in on headaches once this issue has been resolved  -labs ordered, will check for AI causes of arthritis given family hx and personal hx of UC  -Continue current medications and maintain follow up with specialists.  Return to clinic JANEEN Dixon MD  [facility] [place]      Answers for HPI/ROS submitted by the patient on 10/27/2020   activity change: No  unexpected weight change: No  rhinorrhea: No  trouble swallowing: No  visual disturbance: No  chest tightness: No  polyuria: Yes  difficulty urinating: No  menstrual problem: No  joint swelling: No  arthralgias: Yes  confusion: No  dysphoric mood: No

## 2020-11-12 ENCOUNTER — PATIENT MESSAGE (OUTPATIENT)
Dept: FAMILY MEDICINE | Facility: CLINIC | Age: 60
End: 2020-11-12

## 2020-11-23 ENCOUNTER — PATIENT MESSAGE (OUTPATIENT)
Dept: FAMILY MEDICINE | Facility: CLINIC | Age: 60
End: 2020-11-23

## 2020-12-10 ENCOUNTER — OFFICE VISIT (OUTPATIENT)
Dept: GASTROENTEROLOGY | Facility: CLINIC | Age: 60
End: 2020-12-10
Payer: COMMERCIAL

## 2020-12-10 VITALS — BODY MASS INDEX: 21.59 KG/M2 | WEIGHT: 117.31 LBS | HEIGHT: 62 IN

## 2020-12-10 DIAGNOSIS — K51.80 OTHER ULCERATIVE COLITIS WITHOUT COMPLICATION: ICD-10-CM

## 2020-12-10 DIAGNOSIS — R15.9 INCONTINENCE OF FECES, UNSPECIFIED FECAL INCONTINENCE TYPE: Primary | ICD-10-CM

## 2020-12-10 PROCEDURE — 99999 PR PBB SHADOW E&M-EST. PATIENT-LVL III: ICD-10-PCS | Mod: PBBFAC,,, | Performed by: INTERNAL MEDICINE

## 2020-12-10 PROCEDURE — 1126F AMNT PAIN NOTED NONE PRSNT: CPT | Mod: S$GLB,,, | Performed by: INTERNAL MEDICINE

## 2020-12-10 PROCEDURE — 3008F PR BODY MASS INDEX (BMI) DOCUMENTED: ICD-10-PCS | Mod: CPTII,S$GLB,, | Performed by: INTERNAL MEDICINE

## 2020-12-10 PROCEDURE — 99204 OFFICE O/P NEW MOD 45 MIN: CPT | Mod: S$GLB,,, | Performed by: INTERNAL MEDICINE

## 2020-12-10 PROCEDURE — 1126F PR PAIN SEVERITY QUANTIFIED, NO PAIN PRESENT: ICD-10-PCS | Mod: S$GLB,,, | Performed by: INTERNAL MEDICINE

## 2020-12-10 PROCEDURE — 99999 PR PBB SHADOW E&M-EST. PATIENT-LVL III: CPT | Mod: PBBFAC,,, | Performed by: INTERNAL MEDICINE

## 2020-12-10 PROCEDURE — 99204 PR OFFICE/OUTPT VISIT, NEW, LEVL IV, 45-59 MIN: ICD-10-PCS | Mod: S$GLB,,, | Performed by: INTERNAL MEDICINE

## 2020-12-10 PROCEDURE — 3008F BODY MASS INDEX DOCD: CPT | Mod: CPTII,S$GLB,, | Performed by: INTERNAL MEDICINE

## 2020-12-10 RX ORDER — MESALAMINE 1.2 G/1
1.2 TABLET, DELAYED RELEASE ORAL DAILY
Qty: 30 TABLET | Refills: 3 | Status: SHIPPED | OUTPATIENT
Start: 2020-12-10 | End: 2021-05-25

## 2020-12-10 RX ORDER — SODIUM, POTASSIUM,MAG SULFATES 17.5-3.13G
1 SOLUTION, RECONSTITUTED, ORAL ORAL DAILY
Qty: 1 KIT | Refills: 0 | Status: SHIPPED | OUTPATIENT
Start: 2020-12-10 | End: 2020-12-12

## 2020-12-10 NOTE — PROGRESS NOTES
GASTROENTEROLOGY CLINIC NOTE    Reason for visit: The primary encounter diagnosis was Incontinence of feces, unspecified fecal incontinence type. A diagnosis of Other ulcerative colitis without complication was also pertinent to this visit.  Referring provider/PCP: Barb Dixon MD    HPI:  Sarahy Ma is a 60 y.o. female here today for chronic UC. New patient.  Previously follows with Dr. Tobias.     She is here to establish care.  Her prior GI provider is likely retiring in the near future.  She is doing fairly well with her colitis, some days are better than others.  She has not had a colonoscopy within the past 3 or 4 years.  See below for her prior history of colitis.  She believes her colitis is mainly left-sided.  There is very rare rectal bleeding at present.  She has intermittent abdominal pains that she does not take anything for.  She takes mesalamine 1.2 g daily.  One pill daily.  She initially was on 4 pills daily.  Sometimes has diarrhea and fecal urgency.  Has fecal incontinence maybe 2 -3 times a month, she is embarrased by this.   She has arthritis in hands and back. Unsure if this is inflammatory or not.    =======================================  IBD history:  Diagnosed 2016, rectal bleeding and tenesmus. abd pains initially. Had her GB out before all this. Diagnosed with Hpylori as well.   Seems to be left sided disease.     Prior therapies  lialda 4.8 for induction        Prior Endoscopy:  EGD:  Colon: about 4 years ago, becky, told left sided colitis.    (Portions of this note were dictated using voice recognition software and may contain dictation related errors in spelling/grammar/syntax not found on text review)    Review of Systems   Constitutional: Negative for fever and weight loss.   HENT: Negative for nosebleeds and sore throat.    Eyes: Negative for double vision and photophobia.   Respiratory: Negative for cough and shortness of breath.    Cardiovascular: Negative for chest  pain and leg swelling.   Gastrointestinal: Positive for abdominal pain and diarrhea. Negative for blood in stool.   Genitourinary: Negative for dysuria and hematuria.   Musculoskeletal: Positive for back pain and joint pain. Negative for neck pain.   Skin: Negative for itching and rash.   Neurological: Negative for dizziness and headaches.   Psychiatric/Behavioral: Negative for hallucinations. The patient does not have insomnia.        Past Medical History: has a past medical history of Postablative hypothyroidism, Tongue cancer, and Ulcerative colitis.    Past Surgical History: has a past surgical history that includes  section; Tonsillectomy; feeding tube; Portacath placement; Tubal ligation; Cholecystectomy (2016); Cholecystectomy (2016); groin surgery (2017); Colonoscopy; Hysterectomy; and Oophorectomy.    Family History:family history includes Diabetes in her father; Heart attack in her father; Heart disease in her father; Heart failure in her father; Hyperlipidemia in her father; Hypertension in her father and mother; Thyroid disease in her brother, brother, sister, and sister.    Allergies:   Review of patient's allergies indicates:   Allergen Reactions    Amoxicillin      Other reaction(s): fever blisters    Nsaids (non-steroidal anti-inflammatory drug)      Other reaction(s): nausea, stomach issues       Social History: reports that she has quit smoking. She has never used smokeless tobacco. She reports previous alcohol use. She reports that she does not use drugs.    Home medications:   Current Outpatient Medications on File Prior to Visit   Medication Sig Dispense Refill    albuterol (PROVENTIL/VENTOLIN HFA) 90 mcg/actuation inhaler Inhale 1-2 puffs into the lungs every 6 (six) hours as needed. Rescue 1 Inhaler 0    amLODIPine (NORVASC) 10 MG tablet Take 1 tablet (10 mg total) by mouth once daily. 90 tablet 3    cholecalciferol, vitamin D3, 5,000 unit TbDL Take by mouth once  "daily.      SYNTHROID 75 mcg tablet Take 1 tablet (75 mcg total) by mouth before breakfast. 90 tablet 3    [DISCONTINUED] mesalamine (LIALDA) 1.2 gram TbEC Take 2.4 g by mouth once daily.      [DISCONTINUED] ALPRAZolam (XANAX) 0.25 MG tablet Take 1 tablet (0.25 mg total) by mouth daily as needed for Anxiety. 30 tablet 1    [DISCONTINUED] cefUROXime (CEFTIN) 250 MG tablet Take 1 tablet (250 mg total) by mouth 2 (two) times a day. 10 tablet 0    [DISCONTINUED] conjugated estrogens (PREMARIN) vaginal cream Insert 0.5 gm vaginally twice a week as directed 30 g 11    [DISCONTINUED] hydroCHLOROthiazide (HYDRODIURIL) 25 MG tablet Take 1 tablet (25 mg total) by mouth once daily. 30 tablet 6    [DISCONTINUED] HYDROcodone-acetaminophen (NORCO) 5-325 mg per tablet Take 1/2 tablet by mouth every 6 (six) hours as needed. for pain 15 tablet 0    [DISCONTINUED] hydroxyzine HCL (ATARAX) 25 MG tablet Take 1 tablet (25 mg total) by mouth 3 (three) times daily as needed for Anxiety. 20 tablet 0    [DISCONTINUED] tobramycin-dexamethasone 0.3-0.1% (TOBRADEX) 0.3-0.1 % DrpS Place 2 to 3 drops into both ears daily 5 mL 0     No current facility-administered medications on file prior to visit.        Vital signs:  Ht 5' 2" (1.575 m)   Wt 53.2 kg (117 lb 4.6 oz)   BMI 21.45 kg/m²     Physical Exam  Vitals signs reviewed.   Constitutional:       General: She is not in acute distress.     Appearance: She is not diaphoretic.   HENT:      Head: Normocephalic and atraumatic.   Eyes:      General: No scleral icterus.     Conjunctiva/sclera: Conjunctivae normal.   Neck:      Musculoskeletal: Neck supple.   Cardiovascular:      Rate and Rhythm: Normal rate.      Heart sounds: Normal heart sounds.   Pulmonary:      Effort: Pulmonary effort is normal. No respiratory distress.      Breath sounds: Normal breath sounds. No stridor.   Abdominal:      General: There is no distension.      Palpations: Abdomen is soft. There is no mass.      " Tenderness: There is no abdominal tenderness. There is no guarding or rebound.   Musculoskeletal:         General: No tenderness or deformity.   Lymphadenopathy:      Cervical: No cervical adenopathy.   Skin:     General: Skin is warm and dry.      Findings: No rash.   Neurological:      Mental Status: She is alert and oriented to person, place, and time.      Gait: Gait normal.   Psychiatric:         Mood and Affect: Mood normal.         Behavior: Behavior normal.         Routine labs:  Lab Results   Component Value Date    WBC 3.78 (L) 11/09/2020    HGB 13.5 11/09/2020    HCT 40.7 11/09/2020    MCV 91 11/09/2020     11/09/2020     No results found for: INR  Lab Results   Component Value Date    IRON 39 11/14/2016    FERRITIN 141 12/28/2017    TIBC 451 (H) 11/14/2016    FESATURATED 9 (L) 11/14/2016     Lab Results   Component Value Date     11/09/2020    K 3.8 11/09/2020     11/09/2020    CO2 29 11/09/2020    BUN 14 11/09/2020    CREATININE 0.84 11/09/2020     Lab Results   Component Value Date    ALBUMIN 4.2 11/09/2020    ALT 15 11/09/2020    AST 22 11/09/2020    ALKPHOS 75 11/09/2020    BILITOT 0.7 11/09/2020     No results found for: GLUCOSE    I have reviewed prior labs, imaging, notes from last month    Reviewed recent labs, normal    Assessment:  1. Incontinence of feces, unspecified fecal incontinence type    2. Other ulcerative colitis without complication      Long discussion regarding colitis including therapy, complications and expected disease course.    Plan:  Orders Placed This Encounter    COVID-19 Routine Screening    mesalamine (LIALDA) 1.2 gram TbEC    sodium,potassium,mag sulfates (SUPREP BOWEL PREP KIT) 17.5-3.13-1.6 gram SolR    Case Request Endoscopy: COLONOSCOPY     Continue lialda 1.2gm daily    Needs colonoscopy for disease assessment.  Consider refer to rheumatology to determine inflammatory vs. Non-inflammatory arthropathy, as this will dictate her therapies (she  wishes to hold off for now and await colitis eval)    Use metamucil slurry for incontinence   - can consider trying questran in future.    She will bring us her medical records for review.    RTC after above complete      Chetan Forte MD  [facility]

## 2020-12-10 NOTE — LETTER
December 10, 2020      Barb Dixon MD  [id number] [facility]  [address]  [place] [address]           [place] [facility]  [address], [address]  [facility] [address]  Phone: [phone]          Patient: Sarahy Ma   MR Number: [id number]   YOB: 1960   Date of Visit: 12/10/2020       Dear Dr. Barb Dixon:    Thank you for referring Sarahy Ma to me for evaluation. Attached you will find relevant portions of my assessment and plan of care.    If you have questions, please do not hesitate to call me. I look forward to following Sarahy Ma along with you.    Sincerely,    Chetan Forte MD    Enclosure  CC:  No Recipients    If you would like to receive this communication electronically, please contact [email or web address] or [phone] to request more information on [employer] Link access.    For providers and/or their staff who would like to refer a patient to [facility], please contact us through our one-stop-shop provider referral line, [facility], at [phone].    If you feel you have received this communication in error or would no longer like to receive these types of communications, please e-mail [email or web address]

## 2020-12-10 NOTE — PATIENT INSTRUCTIONS
SUPREP Instructions    [facility] [address]  Clinic Office [phone]  Endoscopy Lab [phone]    You are scheduled for a Colonoscopy with Dr. Forte on 12/30/20 at [facility].  You will check in at the Information desk on the first floor of the hospital. Please contact the office to reschedule if needed at     Do not follow instructions listed in the box.     A responsible adult (family member or friend) must come with you and transport you home.  You are not allowed to drive, take a taxi/ride share or bus or leave the Endoscopy Center alone.  If you do not have a responsible adult with you to take you home, your exam will be cancelled.      Please follow instructions of  if you are taking anticoagulant/blood thinning medications such as Aggrenox, Brilinta, Effient, Eliquis, Lovenox, Plavix, Pletal, Pradaxa, Ticilid, Xarelto or Coumadin.      Please skip your morning dose of insulin or other oral medications for diabetes the morning of the procedure unless instructed otherwise by your doctor. You should take your blood pressure, heart, anti-rejection and or seizure medication the morning of your procedure.     To ensure that your test is accurate and complete, you must follow these instructions - please do not use the instructions provided from the pharmacy.     For your safety and the safety of the providers and staff, You will be required to have a screening Covid test 72 hours before procedure.    Do not follow instructions listed in the box.      The Day Before The Procedure:     Follow a CLEAR LIQUID DIET for the entire day before your scheduled colonoscopy.  This means no solid food the entire day.   A clear liquid diet includes the following:  o Water, Coffee or decaffeinated coffee (no milk or cream)  o Tea, Herbal tea  o Carbonated beverages (soft drinks), regular and sugar free  o Gelatin  o Apple Juice, white grape juice, white  cranberry juice  o Gatorade, Power Aid, Crystal Light, Yoandy Aid (Yellow, Green, Clear)  o Lemonade and Limeade  o Bouillon, clear consomme'  o Snowball, popsicles  (Yellow, Green, Clear)    DO NOT DRINK ANY LIQUIDS CONTAINING RED DYE  DO NOT DRINK ANY LIQUIDS NOT SPECIFICALLY LISTED  DO NOT DRINK ALCOHOL     At 5 pm the evening before your colonoscopy:  o Pour one (1) bottle of SUPREP into the container provided in the box. Add water to the line on the container and mix well.  Drink the whole container and then drink 2 more containers of water over 1 hour.  - This is sometimes easier to drink if this solution is cold, so you can mix the solution 20 minutes ahead of time and place in the refrigerator prior to drinking.  You have to drink the solution within 30-45 minutes of mixing it.  Do NOT put this solution over ice.  It IS ok to drink with a straw.    The Day of the Procedure - The Endoscopy department will call you 2 days prior to your procedure to give you the exact time     5 hours prior to your ARRIVAL TIME (this may be in the middle of the night)  o Pour the 2nd bottle of SUPREP into the container provided in the box.  Add water to the line on the container and mix well.  Drink the whole container and then drink 2 more containers of water over 1 hour.    o AFTER YOU HAVE COMPLETED YOUR PREP, YOU MAY HAVE NOTHING ELSE BY MOUTH    o Please leave all valuables and jewelry at home.    o At 600 am, you may take the last dose of any medications you are allowed to take with a small sip of water (blood pressure, heart, anti-rejection and or seizure medication).  If you use inhalers bring them with you.    o You may call the Endoscopy department at [phone] with any questions regarding your procedure.

## 2020-12-11 ENCOUNTER — PATIENT MESSAGE (OUTPATIENT)
Dept: ENDOSCOPY | Facility: HOSPITAL | Age: 60
End: 2020-12-11

## 2020-12-21 ENCOUNTER — PATIENT MESSAGE (OUTPATIENT)
Dept: ENDOSCOPY | Facility: HOSPITAL | Age: 60
End: 2020-12-21

## 2020-12-21 ENCOUNTER — DOCUMENTATION ONLY (OUTPATIENT)
Dept: GASTROENTEROLOGY | Facility: HOSPITAL | Age: 60
End: 2020-12-21

## 2020-12-21 NOTE — PROGRESS NOTES
Reviewed procedure from 2017 becky    Severe grade 4 colitis left sided  Up to 20 cm.    egd with mild esophagitis.

## 2020-12-27 ENCOUNTER — LAB VISIT (OUTPATIENT)
Dept: URGENT CARE | Facility: CLINIC | Age: 60
End: 2020-12-27
Payer: COMMERCIAL

## 2020-12-27 VITALS — TEMPERATURE: 98 F

## 2020-12-27 DIAGNOSIS — K51.80 OTHER ULCERATIVE COLITIS WITHOUT COMPLICATION: ICD-10-CM

## 2020-12-27 PROCEDURE — U0003 INFECTIOUS AGENT DETECTION BY NUCLEIC ACID (DNA OR RNA); SEVERE ACUTE RESPIRATORY SYNDROME CORONAVIRUS 2 (SARS-COV-2) (CORONAVIRUS DISEASE [COVID-19]), AMPLIFIED PROBE TECHNIQUE, MAKING USE OF HIGH THROUGHPUT TECHNOLOGIES AS DESCRIBED BY CMS-2020-01-R: HCPCS

## 2020-12-28 ENCOUNTER — TELEPHONE (OUTPATIENT)
Dept: GASTROENTEROLOGY | Facility: CLINIC | Age: 60
End: 2020-12-28

## 2020-12-28 ENCOUNTER — TELEPHONE (OUTPATIENT)
Dept: ENDOSCOPY | Facility: HOSPITAL | Age: 60
End: 2020-12-28

## 2020-12-28 LAB — SARS-COV-2 RNA RESP QL NAA+PROBE: NOT DETECTED

## 2020-12-28 NOTE — TELEPHONE ENCOUNTER
Spoke with patient about arrival time @0945.   Covid test = NEAGTIVE    Prep instructions reviewed: the day before the procedure, follow a clear liquid diet all day, then start the first 1/2 of prep at 5pm and take 2nd 1/2 of prep @0445.  Pt must be completely NPO when prep completed @0645.              Medications: Do not take Insulin or oral diabetic medications the day of the procedure.  Take as prescribed: heart, seizure and blood pressure medication in the morning with a sip of water (less than an ounce).  Take any breathing medications and bring inhalers to hospital with you Leave all valuables and jewelry at home.     Wear comfortable clothes to procedure to change into hospital gown You cannot drive for 24 hours after your procedure because you will receive sedation for your procedure to make you comfortable.  A ride must be provided at discharge.

## 2020-12-28 NOTE — TELEPHONE ENCOUNTER
----- Message from Char Coto sent at 12/28/2020 10:36 AM CST -----  Regarding: Need Time of Arrival for Procedure  Type: Need Time of Arrival for Procedure    Name of Caller:patient need time of arrival for procedure scheduled   When is the first available appointment?  Symptoms:  Best Call Back Number:[phone]  Additional Information:

## 2020-12-29 ENCOUNTER — TELEPHONE (OUTPATIENT)
Dept: GASTROENTEROLOGY | Facility: CLINIC | Age: 60
End: 2020-12-29

## 2020-12-29 ENCOUNTER — TELEPHONE (OUTPATIENT)
Dept: ENDOSCOPY | Facility: HOSPITAL | Age: 60
End: 2020-12-29

## 2020-12-29 DIAGNOSIS — Z01.812 PRE-PROCEDURE LAB EXAM: ICD-10-CM

## 2020-12-29 NOTE — TELEPHONE ENCOUNTER
----- Message from Sowmya Barrios RN sent at 12/29/2020 11:22 AM CST -----  PT WANTS TO RESCHEDULE  WITH dr LAU

## 2021-01-18 ENCOUNTER — PATIENT MESSAGE (OUTPATIENT)
Dept: FAMILY MEDICINE | Facility: CLINIC | Age: 61
End: 2021-01-18

## 2021-01-18 ENCOUNTER — CLINICAL SUPPORT (OUTPATIENT)
Dept: URGENT CARE | Facility: CLINIC | Age: 61
End: 2021-01-18
Payer: COMMERCIAL

## 2021-01-18 VITALS — TEMPERATURE: 98 F

## 2021-01-18 DIAGNOSIS — U07.1 COVID-19: Primary | ICD-10-CM

## 2021-01-18 LAB
CTP QC/QA: YES
SARS-COV-2 RDRP RESP QL NAA+PROBE: NEGATIVE

## 2021-01-24 ENCOUNTER — PATIENT MESSAGE (OUTPATIENT)
Dept: FAMILY MEDICINE | Facility: CLINIC | Age: 61
End: 2021-01-24

## 2021-01-25 ENCOUNTER — PATIENT MESSAGE (OUTPATIENT)
Dept: FAMILY MEDICINE | Facility: CLINIC | Age: 61
End: 2021-01-25

## 2021-01-25 RX ORDER — CELECOXIB 100 MG/1
100 CAPSULE ORAL 2 TIMES DAILY
Qty: 60 CAPSULE | Refills: 1 | Status: SHIPPED | OUTPATIENT
Start: 2021-01-25 | End: 2021-04-28

## 2021-01-26 ENCOUNTER — PATIENT MESSAGE (OUTPATIENT)
Dept: FAMILY MEDICINE | Facility: CLINIC | Age: 61
End: 2021-01-26

## 2021-01-29 ENCOUNTER — PATIENT MESSAGE (OUTPATIENT)
Dept: FAMILY MEDICINE | Facility: CLINIC | Age: 61
End: 2021-01-29

## 2021-01-29 DIAGNOSIS — R30.0 DYSURIA: Primary | ICD-10-CM

## 2021-02-01 ENCOUNTER — PATIENT MESSAGE (OUTPATIENT)
Dept: FAMILY MEDICINE | Facility: CLINIC | Age: 61
End: 2021-02-01

## 2021-02-01 RX ORDER — NITROFURANTOIN 25; 75 MG/1; MG/1
100 CAPSULE ORAL 2 TIMES DAILY
Qty: 10 CAPSULE | Refills: 0 | Status: ON HOLD | OUTPATIENT
Start: 2021-02-01 | End: 2021-02-19 | Stop reason: CLARIF

## 2021-02-02 ENCOUNTER — PATIENT MESSAGE (OUTPATIENT)
Dept: ENDOSCOPY | Facility: HOSPITAL | Age: 61
End: 2021-02-02

## 2021-02-04 ENCOUNTER — PATIENT MESSAGE (OUTPATIENT)
Dept: FAMILY MEDICINE | Facility: CLINIC | Age: 61
End: 2021-02-04

## 2021-02-17 ENCOUNTER — TELEPHONE (OUTPATIENT)
Dept: GASTROENTEROLOGY | Facility: CLINIC | Age: 61
End: 2021-02-17

## 2021-02-17 ENCOUNTER — TELEPHONE (OUTPATIENT)
Dept: ENDOSCOPY | Facility: HOSPITAL | Age: 61
End: 2021-02-17

## 2021-02-19 ENCOUNTER — HOSPITAL ENCOUNTER (OUTPATIENT)
Facility: HOSPITAL | Age: 61
Discharge: HOME | End: 2021-02-19
Attending: INTERNAL MEDICINE | Admitting: INTERNAL MEDICINE
Payer: COMMERCIAL

## 2021-02-19 ENCOUNTER — ANESTHESIA EVENT (OUTPATIENT)
Dept: ENDOSCOPY | Facility: HOSPITAL | Age: 61
End: 2021-02-19
Payer: COMMERCIAL

## 2021-02-19 ENCOUNTER — ANESTHESIA (OUTPATIENT)
Dept: ENDOSCOPY | Facility: HOSPITAL | Age: 61
End: 2021-02-19
Payer: COMMERCIAL

## 2021-02-19 VITALS
TEMPERATURE: 98 F | DIASTOLIC BLOOD PRESSURE: 76 MMHG | BODY MASS INDEX: 21.16 KG/M2 | SYSTOLIC BLOOD PRESSURE: 101 MMHG | OXYGEN SATURATION: 96 % | HEART RATE: 71 BPM | HEIGHT: 62 IN | WEIGHT: 115 LBS | RESPIRATION RATE: 16 BRPM

## 2021-02-19 DIAGNOSIS — K51.90 ULCERATIVE COLITIS: ICD-10-CM

## 2021-02-19 LAB — SARS-COV-2 RDRP RESP QL NAA+PROBE: NEGATIVE

## 2021-02-19 PROCEDURE — 27201012 HC FORCEPS, HOT/COLD, DISP: Performed by: INTERNAL MEDICINE

## 2021-02-19 PROCEDURE — 88305 TISSUE EXAM BY PATHOLOGIST: CPT | Mod: 26,,, | Performed by: PATHOLOGY

## 2021-02-19 PROCEDURE — 45380 PR COLONOSCOPY,BIOPSY: ICD-10-PCS | Mod: ,,, | Performed by: INTERNAL MEDICINE

## 2021-02-19 PROCEDURE — 25000003 PHARM REV CODE 250: Performed by: INTERNAL MEDICINE

## 2021-02-19 PROCEDURE — 88305 TISSUE EXAM BY PATHOLOGIST: CPT | Mod: 59 | Performed by: PATHOLOGY

## 2021-02-19 PROCEDURE — 37000009 HC ANESTHESIA EA ADD 15 MINS: Performed by: INTERNAL MEDICINE

## 2021-02-19 PROCEDURE — 25000003 PHARM REV CODE 250: Performed by: NURSE ANESTHETIST, CERTIFIED REGISTERED

## 2021-02-19 PROCEDURE — 45380 COLONOSCOPY AND BIOPSY: CPT | Performed by: INTERNAL MEDICINE

## 2021-02-19 PROCEDURE — 37000008 HC ANESTHESIA 1ST 15 MINUTES: Performed by: INTERNAL MEDICINE

## 2021-02-19 PROCEDURE — 63600175 PHARM REV CODE 636 W HCPCS: Performed by: NURSE ANESTHETIST, CERTIFIED REGISTERED

## 2021-02-19 PROCEDURE — 45380 COLONOSCOPY AND BIOPSY: CPT | Mod: ,,, | Performed by: INTERNAL MEDICINE

## 2021-02-19 PROCEDURE — U0002 COVID-19 LAB TEST NON-CDC: HCPCS

## 2021-02-19 PROCEDURE — 88305 TISSUE EXAM BY PATHOLOGIST: ICD-10-PCS | Mod: 26,,, | Performed by: PATHOLOGY

## 2021-02-19 RX ORDER — SODIUM CHLORIDE 9 MG/ML
INJECTION, SOLUTION INTRAVENOUS CONTINUOUS
Status: DISCONTINUED | OUTPATIENT
Start: 2021-02-19 | End: 2021-02-19 | Stop reason: HOSPADM

## 2021-02-19 RX ORDER — LIDOCAINE HYDROCHLORIDE 20 MG/ML
INJECTION INTRAVENOUS
Status: DISCONTINUED | OUTPATIENT
Start: 2021-02-19 | End: 2021-02-19

## 2021-02-19 RX ORDER — PROPOFOL 10 MG/ML
VIAL (ML) INTRAVENOUS CONTINUOUS PRN
Status: DISCONTINUED | OUTPATIENT
Start: 2021-02-19 | End: 2021-02-19

## 2021-02-19 RX ORDER — DEXTROMETHORPHAN/PSEUDOEPHED 2.5-7.5/.8
DROPS ORAL
Status: COMPLETED | OUTPATIENT
Start: 2021-02-19 | End: 2021-02-19

## 2021-02-19 RX ORDER — PROPOFOL 10 MG/ML
VIAL (ML) INTRAVENOUS
Status: DISCONTINUED | OUTPATIENT
Start: 2021-02-19 | End: 2021-02-19

## 2021-02-19 RX ORDER — SODIUM CHLORIDE 0.9 % (FLUSH) 0.9 %
10 SYRINGE (ML) INJECTION
Status: DISCONTINUED | OUTPATIENT
Start: 2021-02-19 | End: 2021-02-19 | Stop reason: HOSPADM

## 2021-02-19 RX ADMIN — LIDOCAINE HYDROCHLORIDE 100 MG: 20 INJECTION, SOLUTION INTRAVENOUS at 12:02

## 2021-02-19 RX ADMIN — PROPOFOL 150 MCG/KG/MIN: 10 INJECTION, EMULSION INTRAVENOUS at 12:02

## 2021-02-19 RX ADMIN — SIMETHICONE 40 MG: 20 SUSPENSION/ DROPS ORAL at 12:02

## 2021-02-19 RX ADMIN — PROPOFOL 100 MG: 10 INJECTION, EMULSION INTRAVENOUS at 12:02

## 2021-02-19 RX ADMIN — SODIUM CHLORIDE 20 ML/HR: 0.9 INJECTION, SOLUTION INTRAVENOUS at 11:02

## 2021-02-22 LAB
FINAL PATHOLOGIC DIAGNOSIS: NORMAL
GROSS: NORMAL
Lab: NORMAL

## 2021-02-24 DIAGNOSIS — Z12.31 OTHER SCREENING MAMMOGRAM: ICD-10-CM

## 2021-02-26 ENCOUNTER — TELEPHONE (OUTPATIENT)
Dept: FAMILY MEDICINE | Facility: CLINIC | Age: 61
End: 2021-02-26

## 2021-03-25 DIAGNOSIS — R91.1 SOLITARY PULMONARY NODULE: ICD-10-CM

## 2021-03-26 ENCOUNTER — PATIENT MESSAGE (OUTPATIENT)
Dept: PULMONOLOGY | Facility: CLINIC | Age: 61
End: 2021-03-26

## 2021-04-11 RX ORDER — AMLODIPINE BESYLATE 10 MG/1
10 TABLET ORAL DAILY
Qty: 90 TABLET | Refills: 3 | Status: SHIPPED | OUTPATIENT
Start: 2021-04-11 | End: 2021-10-28 | Stop reason: SDUPTHER

## 2021-04-13 ENCOUNTER — PATIENT MESSAGE (OUTPATIENT)
Dept: FAMILY MEDICINE | Facility: CLINIC | Age: 61
End: 2021-04-13

## 2021-04-28 ENCOUNTER — OFFICE VISIT (OUTPATIENT)
Dept: FAMILY MEDICINE | Facility: CLINIC | Age: 61
End: 2021-04-28
Payer: COMMERCIAL

## 2021-04-28 VITALS
TEMPERATURE: 98 F | SYSTOLIC BLOOD PRESSURE: 112 MMHG | OXYGEN SATURATION: 98 % | HEART RATE: 83 BPM | BODY MASS INDEX: 21.35 KG/M2 | WEIGHT: 116.75 LBS | DIASTOLIC BLOOD PRESSURE: 72 MMHG

## 2021-04-28 DIAGNOSIS — I10 ESSENTIAL HYPERTENSION: ICD-10-CM

## 2021-04-28 DIAGNOSIS — R91.1 NODULE OF UPPER LOBE OF RIGHT LUNG: ICD-10-CM

## 2021-04-28 DIAGNOSIS — K51.80 OTHER ULCERATIVE COLITIS WITHOUT COMPLICATION: ICD-10-CM

## 2021-04-28 DIAGNOSIS — Z85.810 HISTORY OF TONGUE CANCER: ICD-10-CM

## 2021-04-28 DIAGNOSIS — E89.0 POSTABLATIVE HYPOTHYROIDISM: ICD-10-CM

## 2021-04-28 PROCEDURE — 3074F SYST BP LT 130 MM HG: CPT | Mod: CPTII,S$GLB,, | Performed by: INTERNAL MEDICINE

## 2021-04-28 PROCEDURE — 3074F PR MOST RECENT SYSTOLIC BLOOD PRESSURE < 130 MM HG: ICD-10-PCS | Mod: CPTII,S$GLB,, | Performed by: INTERNAL MEDICINE

## 2021-04-28 PROCEDURE — 3008F PR BODY MASS INDEX (BMI) DOCUMENTED: ICD-10-PCS | Mod: CPTII,S$GLB,, | Performed by: INTERNAL MEDICINE

## 2021-04-28 PROCEDURE — 99999 PR PBB SHADOW E&M-EST. PATIENT-LVL III: CPT | Mod: PBBFAC,,, | Performed by: INTERNAL MEDICINE

## 2021-04-28 PROCEDURE — 99214 PR OFFICE/OUTPT VISIT, EST, LEVL IV, 30-39 MIN: ICD-10-PCS | Mod: S$GLB,,, | Performed by: INTERNAL MEDICINE

## 2021-04-28 PROCEDURE — 3078F PR MOST RECENT DIASTOLIC BLOOD PRESSURE < 80 MM HG: ICD-10-PCS | Mod: CPTII,S$GLB,, | Performed by: INTERNAL MEDICINE

## 2021-04-28 PROCEDURE — 1126F PR PAIN SEVERITY QUANTIFIED, NO PAIN PRESENT: ICD-10-PCS | Mod: S$GLB,,, | Performed by: INTERNAL MEDICINE

## 2021-04-28 PROCEDURE — 99214 OFFICE O/P EST MOD 30 MIN: CPT | Mod: S$GLB,,, | Performed by: INTERNAL MEDICINE

## 2021-04-28 PROCEDURE — 3008F BODY MASS INDEX DOCD: CPT | Mod: CPTII,S$GLB,, | Performed by: INTERNAL MEDICINE

## 2021-04-28 PROCEDURE — 99999 PR PBB SHADOW E&M-EST. PATIENT-LVL III: ICD-10-PCS | Mod: PBBFAC,,, | Performed by: INTERNAL MEDICINE

## 2021-04-28 PROCEDURE — 3078F DIAST BP <80 MM HG: CPT | Mod: CPTII,S$GLB,, | Performed by: INTERNAL MEDICINE

## 2021-04-28 PROCEDURE — 1126F AMNT PAIN NOTED NONE PRSNT: CPT | Mod: S$GLB,,, | Performed by: INTERNAL MEDICINE

## 2021-05-27 ENCOUNTER — PATIENT MESSAGE (OUTPATIENT)
Dept: FAMILY MEDICINE | Facility: CLINIC | Age: 61
End: 2021-05-27

## 2021-05-28 ENCOUNTER — PATIENT MESSAGE (OUTPATIENT)
Dept: FAMILY MEDICINE | Facility: CLINIC | Age: 61
End: 2021-05-28

## 2021-05-29 ENCOUNTER — PATIENT MESSAGE (OUTPATIENT)
Dept: FAMILY MEDICINE | Facility: CLINIC | Age: 61
End: 2021-05-29

## 2021-06-02 ENCOUNTER — OFFICE VISIT (OUTPATIENT)
Dept: FAMILY MEDICINE | Facility: CLINIC | Age: 61
End: 2021-06-02
Payer: COMMERCIAL

## 2021-06-02 VITALS
SYSTOLIC BLOOD PRESSURE: 116 MMHG | DIASTOLIC BLOOD PRESSURE: 70 MMHG | HEART RATE: 75 BPM | TEMPERATURE: 98 F | BODY MASS INDEX: 21.53 KG/M2 | WEIGHT: 117.75 LBS | OXYGEN SATURATION: 98 %

## 2021-06-02 DIAGNOSIS — R06.02 SHORTNESS OF BREATH: ICD-10-CM

## 2021-06-02 DIAGNOSIS — I10 ESSENTIAL HYPERTENSION: ICD-10-CM

## 2021-06-02 DIAGNOSIS — R91.1 NODULE OF UPPER LOBE OF RIGHT LUNG: ICD-10-CM

## 2021-06-02 DIAGNOSIS — G89.29 CHRONIC MIDLINE LOW BACK PAIN WITHOUT SCIATICA: ICD-10-CM

## 2021-06-02 DIAGNOSIS — J40 BRONCHITIS: Primary | ICD-10-CM

## 2021-06-02 DIAGNOSIS — M54.50 CHRONIC MIDLINE LOW BACK PAIN WITHOUT SCIATICA: ICD-10-CM

## 2021-06-02 PROCEDURE — 1126F PR PAIN SEVERITY QUANTIFIED, NO PAIN PRESENT: ICD-10-PCS | Mod: S$GLB,,, | Performed by: INTERNAL MEDICINE

## 2021-06-02 PROCEDURE — 99214 PR OFFICE/OUTPT VISIT, EST, LEVL IV, 30-39 MIN: ICD-10-PCS | Mod: S$GLB,,, | Performed by: INTERNAL MEDICINE

## 2021-06-02 PROCEDURE — 99999 PR PBB SHADOW E&M-EST. PATIENT-LVL III: CPT | Mod: PBBFAC,,, | Performed by: INTERNAL MEDICINE

## 2021-06-02 PROCEDURE — 99999 PR PBB SHADOW E&M-EST. PATIENT-LVL III: ICD-10-PCS | Mod: PBBFAC,,, | Performed by: INTERNAL MEDICINE

## 2021-06-02 PROCEDURE — 3008F BODY MASS INDEX DOCD: CPT | Mod: CPTII,S$GLB,, | Performed by: INTERNAL MEDICINE

## 2021-06-02 PROCEDURE — 99214 OFFICE O/P EST MOD 30 MIN: CPT | Mod: S$GLB,,, | Performed by: INTERNAL MEDICINE

## 2021-06-02 PROCEDURE — 1126F AMNT PAIN NOTED NONE PRSNT: CPT | Mod: S$GLB,,, | Performed by: INTERNAL MEDICINE

## 2021-06-02 PROCEDURE — 3008F PR BODY MASS INDEX (BMI) DOCUMENTED: ICD-10-PCS | Mod: CPTII,S$GLB,, | Performed by: INTERNAL MEDICINE

## 2021-06-02 RX ORDER — FLUTICASONE FUROATE AND VILANTEROL TRIFENATATE 100; 25 UG/1; UG/1
1 POWDER RESPIRATORY (INHALATION) DAILY
Qty: 1 EACH | Refills: 5 | Status: SHIPPED | OUTPATIENT
Start: 2021-06-02 | End: 2021-10-12 | Stop reason: SDUPTHER

## 2021-06-12 ENCOUNTER — PATIENT MESSAGE (OUTPATIENT)
Dept: FAMILY MEDICINE | Facility: CLINIC | Age: 61
End: 2021-06-12

## 2021-06-14 ENCOUNTER — HOSPITAL ENCOUNTER (OUTPATIENT)
Dept: PULMONOLOGY | Facility: HOSPITAL | Age: 61
Discharge: HOME | End: 2021-06-14
Attending: INTERNAL MEDICINE
Payer: COMMERCIAL

## 2021-06-14 ENCOUNTER — PATIENT MESSAGE (OUTPATIENT)
Dept: FAMILY MEDICINE | Facility: CLINIC | Age: 61
End: 2021-06-14

## 2021-06-14 DIAGNOSIS — R06.02 SHORTNESS OF BREATH: ICD-10-CM

## 2021-06-14 PROCEDURE — 94729 DIFFUSING CAPACITY: CPT

## 2021-06-14 PROCEDURE — 94727 GAS DIL/WSHOT DETER LNG VOL: CPT

## 2021-06-14 PROCEDURE — 94010 BREATHING CAPACITY TEST: CPT

## 2021-06-14 RX ORDER — MELOXICAM 15 MG/1
15 TABLET ORAL DAILY
Qty: 30 TABLET | Refills: 3 | Status: SHIPPED | OUTPATIENT
Start: 2021-06-14 | End: 2021-07-06

## 2021-06-25 ENCOUNTER — PATIENT MESSAGE (OUTPATIENT)
Dept: FAMILY MEDICINE | Facility: CLINIC | Age: 61
End: 2021-06-25

## 2021-06-25 DIAGNOSIS — M51.36 DEGENERATIVE DISC DISEASE, LUMBAR: Primary | ICD-10-CM

## 2021-07-01 ENCOUNTER — PATIENT MESSAGE (OUTPATIENT)
Dept: FAMILY MEDICINE | Facility: CLINIC | Age: 61
End: 2021-07-01

## 2021-07-02 ENCOUNTER — PATIENT MESSAGE (OUTPATIENT)
Dept: FAMILY MEDICINE | Facility: CLINIC | Age: 61
End: 2021-07-02

## 2021-07-06 ENCOUNTER — OFFICE VISIT (OUTPATIENT)
Dept: FAMILY MEDICINE | Facility: CLINIC | Age: 61
End: 2021-07-06
Payer: COMMERCIAL

## 2021-07-06 VITALS
DIASTOLIC BLOOD PRESSURE: 78 MMHG | BODY MASS INDEX: 21.6 KG/M2 | SYSTOLIC BLOOD PRESSURE: 118 MMHG | OXYGEN SATURATION: 97 % | TEMPERATURE: 98 F | HEART RATE: 94 BPM | HEIGHT: 62 IN | WEIGHT: 117.38 LBS

## 2021-07-06 DIAGNOSIS — R91.1 NODULE OF UPPER LOBE OF RIGHT LUNG: ICD-10-CM

## 2021-07-06 DIAGNOSIS — R19.7 DIARRHEA, UNSPECIFIED TYPE: Primary | ICD-10-CM

## 2021-07-06 DIAGNOSIS — J40 BRONCHITIS: ICD-10-CM

## 2021-07-06 DIAGNOSIS — R15.2 FECAL URGENCY: ICD-10-CM

## 2021-07-06 DIAGNOSIS — K51.80 OTHER ULCERATIVE COLITIS WITHOUT COMPLICATION: ICD-10-CM

## 2021-07-06 DIAGNOSIS — R06.02 SHORTNESS OF BREATH: ICD-10-CM

## 2021-07-06 PROCEDURE — 3008F BODY MASS INDEX DOCD: CPT | Mod: CPTII,S$GLB,, | Performed by: INTERNAL MEDICINE

## 2021-07-06 PROCEDURE — 3008F PR BODY MASS INDEX (BMI) DOCUMENTED: ICD-10-PCS | Mod: CPTII,S$GLB,, | Performed by: INTERNAL MEDICINE

## 2021-07-06 PROCEDURE — 1126F PR PAIN SEVERITY QUANTIFIED, NO PAIN PRESENT: ICD-10-PCS | Mod: S$GLB,,, | Performed by: INTERNAL MEDICINE

## 2021-07-06 PROCEDURE — 99214 PR OFFICE/OUTPT VISIT, EST, LEVL IV, 30-39 MIN: ICD-10-PCS | Mod: S$GLB,,, | Performed by: INTERNAL MEDICINE

## 2021-07-06 PROCEDURE — 99999 PR PBB SHADOW E&M-EST. PATIENT-LVL III: CPT | Mod: PBBFAC,,, | Performed by: INTERNAL MEDICINE

## 2021-07-06 PROCEDURE — 99214 OFFICE O/P EST MOD 30 MIN: CPT | Mod: S$GLB,,, | Performed by: INTERNAL MEDICINE

## 2021-07-06 PROCEDURE — 99999 PR PBB SHADOW E&M-EST. PATIENT-LVL III: ICD-10-PCS | Mod: PBBFAC,,, | Performed by: INTERNAL MEDICINE

## 2021-07-06 PROCEDURE — 1126F AMNT PAIN NOTED NONE PRSNT: CPT | Mod: S$GLB,,, | Performed by: INTERNAL MEDICINE

## 2021-07-12 LAB
BRPFT: ABNORMAL
DLCO ADJ PRE: 14.01 ML/(MIN*MMHG) (ref 15.8–27.27)
DLCO SINGLE BREATH LLN: 15.8
DLCO SINGLE BREATH PRE REF: 65.1 %
DLCO SINGLE BREATH REF: 21.53
DLCOC SBVA LLN: 3.09
DLCOC SBVA PRE REF: 82.6 %
DLCOC SBVA REF: 4.71
DLCOC SINGLE BREATH LLN: 15.8
DLCOC SINGLE BREATH PRE REF: 65.1 %
DLCOC SINGLE BREATH REF: 21.53
DLCOVA LLN: 3.09
DLCOVA PRE REF: 82.6 %
DLCOVA PRE: 3.89 ML/(MIN*MMHG*L) (ref 3.09–6.33)
DLCOVA REF: 4.71
DLVAADJ PRE: 3.89 ML/(MIN*MMHG*L) (ref 3.09–6.33)
ERVN2 LLN: -16449.21
ERVN2 PRE REF: 94 %
ERVN2 PRE: 0.74 L (ref -16449.21–16450.79)
ERVN2 REF: 0.79
FEF 25 75 LLN: 1.14
FEF 25 75 PRE REF: 122 %
FEF 25 75 REF: 2.2
FEV1 FVC LLN: 68
FEV1 FVC PRE REF: 100.8 %
FEV1 FVC REF: 80
FEV1 LLN: 1.78
FEV1 PRE REF: 98.9 %
FEV1 REF: 2.34
FRCN2 LLN: 1.75
FRCN2 PRE REF: 86.2 %
FRCN2 REF: 2.58
FVC LLN: 2.25
FVC PRE REF: 97.7 %
FVC REF: 2.95
IVC PRE: 2.3 L (ref 2.25–3.66)
IVC SINGLE BREATH LLN: 2.25
IVC SINGLE BREATH PRE REF: 78 %
IVC SINGLE BREATH REF: 2.95
PEF LLN: 4.42
PEF PRE REF: 85.5 %
PEF REF: 6.02
PRE DLCO: 14.01 ML/(MIN*MMHG) (ref 15.8–27.27)
PRE FEF 25 75: 2.69 L/S (ref 1.14–3.27)
PRE FET 100: 7.22 SEC
PRE FEV1 FVC: 80.38 % (ref 67.79–91.71)
PRE FEV1: 2.32 L (ref 1.78–2.91)
PRE FRC N2: 2.22 L
PRE FVC: 2.88 L (ref 2.25–3.66)
PRE PEF: 5.14 L/S (ref 4.42–7.61)
RVN2 LLN: 1.21
RVN2 PRE REF: 63.2 %
RVN2 PRE: 1.13 L (ref 1.21–2.36)
RVN2 REF: 1.79
RVN2TLCN2 LLN: 29.43
RVN2TLCN2 PRE REF: 71.4 %
RVN2TLCN2 PRE: 27.87 % (ref 29.43–48.61)
RVN2TLCN2 REF: 39.02
TLCN2 LLN: 3.58
TLCN2 PRE REF: 88.5 %
TLCN2 PRE: 4.05 L (ref 3.58–5.56)
TLCN2 REF: 4.57
VA PRE: 3.6 L (ref 4.42–4.42)
VA SINGLE BREATH LLN: 4.42
VA SINGLE BREATH PRE REF: 81.4 %
VA SINGLE BREATH REF: 4.42
VCMAXN2 LLN: 2.25
VCMAXN2 PRE REF: 98.8 %
VCMAXN2 PRE: 2.92 L (ref 2.25–3.66)
VCMAXN2 REF: 2.95

## 2021-07-15 ENCOUNTER — PATIENT MESSAGE (OUTPATIENT)
Dept: GASTROENTEROLOGY | Facility: CLINIC | Age: 61
End: 2021-07-15

## 2021-07-15 DIAGNOSIS — K51.80 OTHER ULCERATIVE COLITIS WITHOUT COMPLICATION: Primary | ICD-10-CM

## 2021-07-16 ENCOUNTER — PATIENT MESSAGE (OUTPATIENT)
Dept: FAMILY MEDICINE | Facility: CLINIC | Age: 61
End: 2021-07-16

## 2021-07-19 ENCOUNTER — TELEPHONE (OUTPATIENT)
Dept: PAIN MEDICINE | Facility: CLINIC | Age: 61
End: 2021-07-19

## 2021-07-20 ENCOUNTER — OFFICE VISIT (OUTPATIENT)
Dept: PULMONOLOGY | Facility: CLINIC | Age: 61
End: 2021-07-20
Payer: COMMERCIAL

## 2021-07-20 VITALS
HEIGHT: 62 IN | OXYGEN SATURATION: 98 % | SYSTOLIC BLOOD PRESSURE: 118 MMHG | HEART RATE: 91 BPM | BODY MASS INDEX: 21.34 KG/M2 | WEIGHT: 115.94 LBS | DIASTOLIC BLOOD PRESSURE: 80 MMHG

## 2021-07-20 DIAGNOSIS — R91.1 SOLITARY PULMONARY NODULE: ICD-10-CM

## 2021-07-20 DIAGNOSIS — R91.1 NODULE OF UPPER LOBE OF RIGHT LUNG: ICD-10-CM

## 2021-07-20 DIAGNOSIS — R06.02 SHORTNESS OF BREATH: ICD-10-CM

## 2021-07-20 PROCEDURE — 99999 PR PBB SHADOW E&M-EST. PATIENT-LVL III: CPT | Mod: PBBFAC,,, | Performed by: NURSE PRACTITIONER

## 2021-07-20 PROCEDURE — 3074F PR MOST RECENT SYSTOLIC BLOOD PRESSURE < 130 MM HG: ICD-10-PCS | Mod: CPTII,S$GLB,, | Performed by: NURSE PRACTITIONER

## 2021-07-20 PROCEDURE — 1126F AMNT PAIN NOTED NONE PRSNT: CPT | Mod: CPTII,S$GLB,, | Performed by: NURSE PRACTITIONER

## 2021-07-20 PROCEDURE — 3079F PR MOST RECENT DIASTOLIC BLOOD PRESSURE 80-89 MM HG: ICD-10-PCS | Mod: CPTII,S$GLB,, | Performed by: NURSE PRACTITIONER

## 2021-07-20 PROCEDURE — 3079F DIAST BP 80-89 MM HG: CPT | Mod: CPTII,S$GLB,, | Performed by: NURSE PRACTITIONER

## 2021-07-20 PROCEDURE — 99213 PR OFFICE/OUTPT VISIT, EST, LEVL III, 20-29 MIN: ICD-10-PCS | Mod: S$GLB,,, | Performed by: NURSE PRACTITIONER

## 2021-07-20 PROCEDURE — 3008F PR BODY MASS INDEX (BMI) DOCUMENTED: ICD-10-PCS | Mod: CPTII,S$GLB,, | Performed by: NURSE PRACTITIONER

## 2021-07-20 PROCEDURE — 99999 PR PBB SHADOW E&M-EST. PATIENT-LVL III: ICD-10-PCS | Mod: PBBFAC,,, | Performed by: NURSE PRACTITIONER

## 2021-07-20 PROCEDURE — 99213 OFFICE O/P EST LOW 20 MIN: CPT | Mod: S$GLB,,, | Performed by: NURSE PRACTITIONER

## 2021-07-20 PROCEDURE — 3074F SYST BP LT 130 MM HG: CPT | Mod: CPTII,S$GLB,, | Performed by: NURSE PRACTITIONER

## 2021-07-20 PROCEDURE — 1126F PR PAIN SEVERITY QUANTIFIED, NO PAIN PRESENT: ICD-10-PCS | Mod: CPTII,S$GLB,, | Performed by: NURSE PRACTITIONER

## 2021-07-20 PROCEDURE — 3008F BODY MASS INDEX DOCD: CPT | Mod: CPTII,S$GLB,, | Performed by: NURSE PRACTITIONER

## 2021-07-20 RX ORDER — AZITHROMYCIN 250 MG/1
TABLET, FILM COATED ORAL
COMMUNITY
Start: 2021-07-17 | End: 2021-10-14

## 2021-07-23 ENCOUNTER — TELEPHONE (OUTPATIENT)
Dept: PULMONOLOGY | Facility: CLINIC | Age: 61
End: 2021-07-23

## 2021-07-27 ENCOUNTER — PATIENT OUTREACH (OUTPATIENT)
Dept: ADMINISTRATIVE | Facility: OTHER | Age: 61
End: 2021-07-27

## 2021-07-29 ENCOUNTER — PATIENT MESSAGE (OUTPATIENT)
Dept: FAMILY MEDICINE | Facility: CLINIC | Age: 61
End: 2021-07-29

## 2021-07-29 ENCOUNTER — OFFICE VISIT (OUTPATIENT)
Dept: GASTROENTEROLOGY | Facility: CLINIC | Age: 61
End: 2021-07-29
Payer: COMMERCIAL

## 2021-07-29 VITALS
DIASTOLIC BLOOD PRESSURE: 81 MMHG | WEIGHT: 114 LBS | SYSTOLIC BLOOD PRESSURE: 114 MMHG | HEIGHT: 62 IN | BODY MASS INDEX: 20.98 KG/M2 | HEART RATE: 96 BPM

## 2021-07-29 DIAGNOSIS — R10.13 EPIGASTRIC PAIN: Primary | ICD-10-CM

## 2021-07-29 DIAGNOSIS — K51.80 OTHER ULCERATIVE COLITIS WITHOUT COMPLICATION: ICD-10-CM

## 2021-07-29 DIAGNOSIS — Z86.19 HISTORY OF CLOSTRIDIOIDES DIFFICILE COLITIS: ICD-10-CM

## 2021-07-29 DIAGNOSIS — R10.13 DYSPEPSIA: ICD-10-CM

## 2021-07-29 PROCEDURE — 99214 OFFICE O/P EST MOD 30 MIN: CPT | Mod: S$GLB,,, | Performed by: INTERNAL MEDICINE

## 2021-07-29 PROCEDURE — 99214 PR OFFICE/OUTPT VISIT, EST, LEVL IV, 30-39 MIN: ICD-10-PCS | Mod: S$GLB,,, | Performed by: INTERNAL MEDICINE

## 2021-07-29 PROCEDURE — 3008F BODY MASS INDEX DOCD: CPT | Mod: CPTII,S$GLB,, | Performed by: INTERNAL MEDICINE

## 2021-07-29 PROCEDURE — 1125F PR PAIN SEVERITY QUANTIFIED, PAIN PRESENT: ICD-10-PCS | Mod: CPTII,S$GLB,, | Performed by: INTERNAL MEDICINE

## 2021-07-29 PROCEDURE — 3079F PR MOST RECENT DIASTOLIC BLOOD PRESSURE 80-89 MM HG: ICD-10-PCS | Mod: CPTII,S$GLB,, | Performed by: INTERNAL MEDICINE

## 2021-07-29 PROCEDURE — 99999 PR PBB SHADOW E&M-EST. PATIENT-LVL III: CPT | Mod: PBBFAC,,, | Performed by: INTERNAL MEDICINE

## 2021-07-29 PROCEDURE — 3074F PR MOST RECENT SYSTOLIC BLOOD PRESSURE < 130 MM HG: ICD-10-PCS | Mod: CPTII,S$GLB,, | Performed by: INTERNAL MEDICINE

## 2021-07-29 PROCEDURE — 1159F MED LIST DOCD IN RCRD: CPT | Mod: CPTII,S$GLB,, | Performed by: INTERNAL MEDICINE

## 2021-07-29 PROCEDURE — 1125F AMNT PAIN NOTED PAIN PRSNT: CPT | Mod: CPTII,S$GLB,, | Performed by: INTERNAL MEDICINE

## 2021-07-29 PROCEDURE — 3074F SYST BP LT 130 MM HG: CPT | Mod: CPTII,S$GLB,, | Performed by: INTERNAL MEDICINE

## 2021-07-29 PROCEDURE — 3008F PR BODY MASS INDEX (BMI) DOCUMENTED: ICD-10-PCS | Mod: CPTII,S$GLB,, | Performed by: INTERNAL MEDICINE

## 2021-07-29 PROCEDURE — 1159F PR MEDICATION LIST DOCUMENTED IN MEDICAL RECORD: ICD-10-PCS | Mod: CPTII,S$GLB,, | Performed by: INTERNAL MEDICINE

## 2021-07-29 PROCEDURE — 99999 PR PBB SHADOW E&M-EST. PATIENT-LVL III: ICD-10-PCS | Mod: PBBFAC,,, | Performed by: INTERNAL MEDICINE

## 2021-07-29 PROCEDURE — 3079F DIAST BP 80-89 MM HG: CPT | Mod: CPTII,S$GLB,, | Performed by: INTERNAL MEDICINE

## 2021-07-29 RX ORDER — FAMOTIDINE 20 MG/1
20 TABLET, FILM COATED ORAL 2 TIMES DAILY
COMMUNITY
End: 2021-10-14

## 2021-07-29 RX ORDER — PANTOPRAZOLE SODIUM 40 MG/1
40 TABLET, DELAYED RELEASE ORAL DAILY
Qty: 30 TABLET | Refills: 3 | Status: SHIPPED | OUTPATIENT
Start: 2021-07-29 | End: 2022-04-22

## 2021-07-30 ENCOUNTER — PATIENT MESSAGE (OUTPATIENT)
Dept: GASTROENTEROLOGY | Facility: CLINIC | Age: 61
End: 2021-07-30

## 2021-07-30 DIAGNOSIS — K51.80 OTHER ULCERATIVE COLITIS WITHOUT COMPLICATION: ICD-10-CM

## 2021-07-30 RX ORDER — MESALAMINE 1.2 G/1
2.4 TABLET, DELAYED RELEASE ORAL DAILY
Qty: 60 TABLET | Refills: 6 | Status: SHIPPED | OUTPATIENT
Start: 2021-07-30 | End: 2021-10-11 | Stop reason: SDUPTHER

## 2021-08-05 ENCOUNTER — NURSE TRIAGE (OUTPATIENT)
Dept: ADMINISTRATIVE | Facility: CLINIC | Age: 61
End: 2021-08-05

## 2021-08-09 ENCOUNTER — PATIENT MESSAGE (OUTPATIENT)
Dept: GASTROENTEROLOGY | Facility: CLINIC | Age: 61
End: 2021-08-09

## 2021-08-09 DIAGNOSIS — R10.13 DYSPEPSIA: Primary | ICD-10-CM

## 2021-08-10 RX ORDER — SUCRALFATE 1 G/1
1 TABLET ORAL 3 TIMES DAILY PRN
Qty: 90 TABLET | Refills: 1 | Status: SHIPPED | OUTPATIENT
Start: 2021-08-10 | End: 2022-10-28

## 2021-08-12 ENCOUNTER — PATIENT MESSAGE (OUTPATIENT)
Dept: GASTROENTEROLOGY | Facility: CLINIC | Age: 61
End: 2021-08-12

## 2021-08-12 ENCOUNTER — PATIENT MESSAGE (OUTPATIENT)
Dept: FAMILY MEDICINE | Facility: CLINIC | Age: 61
End: 2021-08-12

## 2021-08-16 ENCOUNTER — PATIENT MESSAGE (OUTPATIENT)
Dept: GASTROENTEROLOGY | Facility: CLINIC | Age: 61
End: 2021-08-16

## 2021-09-13 ENCOUNTER — PATIENT MESSAGE (OUTPATIENT)
Dept: FAMILY MEDICINE | Facility: CLINIC | Age: 61
End: 2021-09-13

## 2021-09-13 RX ORDER — LEVOTHYROXINE SODIUM 75 UG/1
75 TABLET ORAL
Qty: 90 TABLET | Refills: 3 | Status: SHIPPED | OUTPATIENT
Start: 2021-09-13 | End: 2021-10-12 | Stop reason: SDUPTHER

## 2021-10-09 ENCOUNTER — PATIENT MESSAGE (OUTPATIENT)
Dept: FAMILY MEDICINE | Facility: CLINIC | Age: 61
End: 2021-10-09

## 2021-10-10 ENCOUNTER — PATIENT MESSAGE (OUTPATIENT)
Dept: FAMILY MEDICINE | Facility: CLINIC | Age: 61
End: 2021-10-10

## 2021-10-11 DIAGNOSIS — M54.9 BACK PAIN, UNSPECIFIED BACK LOCATION, UNSPECIFIED BACK PAIN LATERALITY, UNSPECIFIED CHRONICITY: Primary | ICD-10-CM

## 2021-10-12 DIAGNOSIS — R06.02 SHORTNESS OF BREATH: ICD-10-CM

## 2021-10-12 RX ORDER — LEVOTHYROXINE SODIUM 75 UG/1
75 TABLET ORAL
Qty: 90 TABLET | Refills: 3 | Status: SHIPPED | OUTPATIENT
Start: 2021-10-12 | End: 2021-10-28 | Stop reason: SDUPTHER

## 2021-10-12 RX ORDER — FLUTICASONE FUROATE AND VILANTEROL TRIFENATATE 100; 25 UG/1; UG/1
1 POWDER RESPIRATORY (INHALATION) DAILY
Qty: 1 EACH | Refills: 5 | Status: SHIPPED | OUTPATIENT
Start: 2021-10-12 | End: 2021-10-28 | Stop reason: SDUPTHER

## 2021-10-14 ENCOUNTER — HOSPITAL ENCOUNTER (OUTPATIENT)
Dept: RADIOLOGY | Facility: HOSPITAL | Age: 61
Discharge: HOME | End: 2021-10-14
Attending: ORTHOPAEDIC SURGERY
Payer: COMMERCIAL

## 2021-10-14 ENCOUNTER — OFFICE VISIT (OUTPATIENT)
Dept: ORTHOPEDICS | Facility: CLINIC | Age: 61
End: 2021-10-14
Payer: COMMERCIAL

## 2021-10-14 VITALS — WEIGHT: 115.44 LBS | BODY MASS INDEX: 21.24 KG/M2 | HEIGHT: 62 IN

## 2021-10-14 DIAGNOSIS — M51.36 DDD (DEGENERATIVE DISC DISEASE), LUMBAR: ICD-10-CM

## 2021-10-14 DIAGNOSIS — M54.9 BACK PAIN, UNSPECIFIED BACK LOCATION, UNSPECIFIED BACK PAIN LATERALITY, UNSPECIFIED CHRONICITY: ICD-10-CM

## 2021-10-14 DIAGNOSIS — M54.16 LUMBAR RADICULOPATHY: Primary | ICD-10-CM

## 2021-10-14 PROCEDURE — 99213 OFFICE O/P EST LOW 20 MIN: CPT | Mod: S$GLB,,, | Performed by: ORTHOPAEDIC SURGERY

## 2021-10-14 PROCEDURE — 3008F PR BODY MASS INDEX (BMI) DOCUMENTED: ICD-10-PCS | Mod: CPTII,S$GLB,, | Performed by: ORTHOPAEDIC SURGERY

## 2021-10-14 PROCEDURE — 3008F BODY MASS INDEX DOCD: CPT | Mod: CPTII,S$GLB,, | Performed by: ORTHOPAEDIC SURGERY

## 2021-10-14 PROCEDURE — 1160F RVW MEDS BY RX/DR IN RCRD: CPT | Mod: CPTII,S$GLB,, | Performed by: ORTHOPAEDIC SURGERY

## 2021-10-14 PROCEDURE — 99999 PR PBB SHADOW E&M-EST. PATIENT-LVL III: CPT | Mod: PBBFAC,,, | Performed by: ORTHOPAEDIC SURGERY

## 2021-10-14 PROCEDURE — 99213 PR OFFICE/OUTPT VISIT, EST, LEVL III, 20-29 MIN: ICD-10-PCS | Mod: S$GLB,,, | Performed by: ORTHOPAEDIC SURGERY

## 2021-10-14 PROCEDURE — 72110 X-RAY EXAM L-2 SPINE 4/>VWS: CPT | Mod: 26,,, | Performed by: RADIOLOGY

## 2021-10-14 PROCEDURE — 1159F MED LIST DOCD IN RCRD: CPT | Mod: CPTII,S$GLB,, | Performed by: ORTHOPAEDIC SURGERY

## 2021-10-14 PROCEDURE — 72110 XR LUMBAR SPINE AP AND LAT WITH FLEX/EXT: ICD-10-PCS | Mod: 26,,, | Performed by: RADIOLOGY

## 2021-10-14 PROCEDURE — 99999 PR PBB SHADOW E&M-EST. PATIENT-LVL III: ICD-10-PCS | Mod: PBBFAC,,, | Performed by: ORTHOPAEDIC SURGERY

## 2021-10-14 PROCEDURE — 72110 X-RAY EXAM L-2 SPINE 4/>VWS: CPT | Mod: TC

## 2021-10-14 PROCEDURE — 1159F PR MEDICATION LIST DOCUMENTED IN MEDICAL RECORD: ICD-10-PCS | Mod: CPTII,S$GLB,, | Performed by: ORTHOPAEDIC SURGERY

## 2021-10-14 PROCEDURE — 1160F PR REVIEW ALL MEDS BY PRESCRIBER/CLIN PHARMACIST DOCUMENTED: ICD-10-PCS | Mod: CPTII,S$GLB,, | Performed by: ORTHOPAEDIC SURGERY

## 2021-10-26 ENCOUNTER — HOSPITAL ENCOUNTER (OUTPATIENT)
Dept: RADIOLOGY | Facility: HOSPITAL | Age: 61
Discharge: HOME | End: 2021-10-26
Attending: ORTHOPAEDIC SURGERY
Payer: COMMERCIAL

## 2021-10-26 DIAGNOSIS — M54.16 LUMBAR RADICULOPATHY: ICD-10-CM

## 2021-10-26 PROCEDURE — 72148 MRI LUMBAR SPINE W/O DYE: CPT | Mod: 26,,, | Performed by: RADIOLOGY

## 2021-10-26 PROCEDURE — 72148 MRI LUMBAR SPINE W/O DYE: CPT | Mod: TC

## 2021-10-26 PROCEDURE — 72148 MRI LUMBAR SPINE WITHOUT CONTRAST: ICD-10-PCS | Mod: 26,,, | Performed by: RADIOLOGY

## 2021-10-28 ENCOUNTER — PATIENT MESSAGE (OUTPATIENT)
Dept: ORTHOPEDICS | Facility: CLINIC | Age: 61
End: 2021-10-28
Payer: COMMERCIAL

## 2021-10-28 ENCOUNTER — OFFICE VISIT (OUTPATIENT)
Dept: FAMILY MEDICINE | Facility: CLINIC | Age: 61
End: 2021-10-28
Payer: COMMERCIAL

## 2021-10-28 VITALS
HEART RATE: 82 BPM | TEMPERATURE: 98 F | OXYGEN SATURATION: 99 % | DIASTOLIC BLOOD PRESSURE: 68 MMHG | SYSTOLIC BLOOD PRESSURE: 112 MMHG | WEIGHT: 115.44 LBS | BODY MASS INDEX: 21.11 KG/M2

## 2021-10-28 DIAGNOSIS — E89.0 POSTABLATIVE HYPOTHYROIDISM: ICD-10-CM

## 2021-10-28 DIAGNOSIS — Z85.810 HISTORY OF TONGUE CANCER: ICD-10-CM

## 2021-10-28 DIAGNOSIS — Z23 NEED FOR INFLUENZA VACCINATION: Primary | ICD-10-CM

## 2021-10-28 DIAGNOSIS — R91.1 NODULE OF UPPER LOBE OF RIGHT LUNG: ICD-10-CM

## 2021-10-28 DIAGNOSIS — R10.13 DYSPEPSIA: ICD-10-CM

## 2021-10-28 DIAGNOSIS — K51.80 OTHER ULCERATIVE COLITIS WITHOUT COMPLICATION: ICD-10-CM

## 2021-10-28 DIAGNOSIS — I10 ESSENTIAL HYPERTENSION: ICD-10-CM

## 2021-10-28 DIAGNOSIS — R06.02 SHORTNESS OF BREATH: ICD-10-CM

## 2021-10-28 PROCEDURE — 3008F PR BODY MASS INDEX (BMI) DOCUMENTED: ICD-10-PCS | Mod: CPTII,S$GLB,, | Performed by: INTERNAL MEDICINE

## 2021-10-28 PROCEDURE — 3074F SYST BP LT 130 MM HG: CPT | Mod: CPTII,S$GLB,, | Performed by: INTERNAL MEDICINE

## 2021-10-28 PROCEDURE — 90471 IMMUNIZATION ADMIN: CPT | Mod: S$GLB,,, | Performed by: INTERNAL MEDICINE

## 2021-10-28 PROCEDURE — 3078F PR MOST RECENT DIASTOLIC BLOOD PRESSURE < 80 MM HG: ICD-10-PCS | Mod: CPTII,S$GLB,, | Performed by: INTERNAL MEDICINE

## 2021-10-28 PROCEDURE — 1159F PR MEDICATION LIST DOCUMENTED IN MEDICAL RECORD: ICD-10-PCS | Mod: CPTII,S$GLB,, | Performed by: INTERNAL MEDICINE

## 2021-10-28 PROCEDURE — 90686 IIV4 VACC NO PRSV 0.5 ML IM: CPT | Mod: S$GLB,,, | Performed by: INTERNAL MEDICINE

## 2021-10-28 PROCEDURE — 90686 FLU VACCINE (QUAD) GREATER THAN OR EQUAL TO 3YO PRESERVATIVE FREE IM: ICD-10-PCS | Mod: S$GLB,,, | Performed by: INTERNAL MEDICINE

## 2021-10-28 PROCEDURE — 3074F PR MOST RECENT SYSTOLIC BLOOD PRESSURE < 130 MM HG: ICD-10-PCS | Mod: CPTII,S$GLB,, | Performed by: INTERNAL MEDICINE

## 2021-10-28 PROCEDURE — 3078F DIAST BP <80 MM HG: CPT | Mod: CPTII,S$GLB,, | Performed by: INTERNAL MEDICINE

## 2021-10-28 PROCEDURE — 99214 PR OFFICE/OUTPT VISIT, EST, LEVL IV, 30-39 MIN: ICD-10-PCS | Mod: 25,S$GLB,, | Performed by: INTERNAL MEDICINE

## 2021-10-28 PROCEDURE — 99999 PR PBB SHADOW E&M-EST. PATIENT-LVL III: CPT | Mod: PBBFAC,,, | Performed by: INTERNAL MEDICINE

## 2021-10-28 PROCEDURE — 90471 FLU VACCINE (QUAD) GREATER THAN OR EQUAL TO 3YO PRESERVATIVE FREE IM: ICD-10-PCS | Mod: S$GLB,,, | Performed by: INTERNAL MEDICINE

## 2021-10-28 PROCEDURE — 99999 PR PBB SHADOW E&M-EST. PATIENT-LVL III: ICD-10-PCS | Mod: PBBFAC,,, | Performed by: INTERNAL MEDICINE

## 2021-10-28 PROCEDURE — 99214 OFFICE O/P EST MOD 30 MIN: CPT | Mod: 25,S$GLB,, | Performed by: INTERNAL MEDICINE

## 2021-10-28 PROCEDURE — 3008F BODY MASS INDEX DOCD: CPT | Mod: CPTII,S$GLB,, | Performed by: INTERNAL MEDICINE

## 2021-10-28 PROCEDURE — 1159F MED LIST DOCD IN RCRD: CPT | Mod: CPTII,S$GLB,, | Performed by: INTERNAL MEDICINE

## 2021-10-28 RX ORDER — AMLODIPINE BESYLATE 10 MG/1
10 TABLET ORAL DAILY
Qty: 90 TABLET | Refills: 3 | Status: SHIPPED | OUTPATIENT
Start: 2021-10-28 | End: 2022-01-05 | Stop reason: SDUPTHER

## 2021-10-28 RX ORDER — LEVOTHYROXINE SODIUM 75 UG/1
75 TABLET ORAL
Qty: 90 TABLET | Refills: 3 | Status: SHIPPED | OUTPATIENT
Start: 2021-10-28 | End: 2022-04-22 | Stop reason: SDUPTHER

## 2021-10-28 RX ORDER — FLUTICASONE FUROATE AND VILANTEROL TRIFENATATE 100; 25 UG/1; UG/1
1 POWDER RESPIRATORY (INHALATION) DAILY
Qty: 1 EACH | Refills: 5 | Status: SHIPPED | OUTPATIENT
Start: 2021-10-28 | End: 2022-07-26

## 2021-11-03 ENCOUNTER — OFFICE VISIT (OUTPATIENT)
Dept: ORTHOPEDICS | Facility: CLINIC | Age: 61
End: 2021-11-03
Payer: COMMERCIAL

## 2021-11-03 DIAGNOSIS — M54.16 LUMBAR RADICULOPATHY: Primary | ICD-10-CM

## 2021-11-03 PROCEDURE — 99213 OFFICE O/P EST LOW 20 MIN: CPT | Mod: 95,,, | Performed by: ORTHOPAEDIC SURGERY

## 2021-11-03 PROCEDURE — 99213 PR OFFICE/OUTPT VISIT, EST, LEVL III, 20-29 MIN: ICD-10-PCS | Mod: 95,,, | Performed by: ORTHOPAEDIC SURGERY

## 2021-11-08 ENCOUNTER — PATIENT MESSAGE (OUTPATIENT)
Dept: ORTHOPEDICS | Facility: CLINIC | Age: 61
End: 2021-11-08
Payer: COMMERCIAL

## 2021-11-10 DIAGNOSIS — M54.16 LUMBAR RADICULOPATHY: Primary | ICD-10-CM

## 2021-11-17 ENCOUNTER — TELEPHONE (OUTPATIENT)
Dept: PAIN MEDICINE | Facility: OTHER | Age: 61
End: 2021-11-17
Payer: COMMERCIAL

## 2021-11-17 DIAGNOSIS — M54.16 LUMBAR RADICULOPATHY: Primary | ICD-10-CM

## 2021-12-21 ENCOUNTER — HOSPITAL ENCOUNTER (OUTPATIENT)
Facility: OTHER | Age: 61
Discharge: HOME | End: 2021-12-21
Attending: ANESTHESIOLOGY | Admitting: ANESTHESIOLOGY
Payer: COMMERCIAL

## 2021-12-21 VITALS
RESPIRATION RATE: 14 BRPM | SYSTOLIC BLOOD PRESSURE: 132 MMHG | HEART RATE: 86 BPM | BODY MASS INDEX: 20.8 KG/M2 | DIASTOLIC BLOOD PRESSURE: 80 MMHG | WEIGHT: 113 LBS | HEIGHT: 62 IN | TEMPERATURE: 98 F | OXYGEN SATURATION: 100 %

## 2021-12-21 DIAGNOSIS — G89.29 CHRONIC PAIN: ICD-10-CM

## 2021-12-21 DIAGNOSIS — M54.16 LUMBAR RADICULOPATHY: Primary | ICD-10-CM

## 2021-12-21 PROCEDURE — 64483 NJX AA&/STRD TFRM EPI L/S 1: CPT | Mod: RT | Performed by: ANESTHESIOLOGY

## 2021-12-21 PROCEDURE — 25000003 PHARM REV CODE 250: Performed by: ANESTHESIOLOGY

## 2021-12-21 PROCEDURE — 64484 NJX AA&/STRD TFRM EPI L/S EA: CPT | Mod: RT,,, | Performed by: ANESTHESIOLOGY

## 2021-12-21 PROCEDURE — 64484 NJX AA&/STRD TFRM EPI L/S EA: CPT | Mod: RT | Performed by: ANESTHESIOLOGY

## 2021-12-21 PROCEDURE — 64483 NJX AA&/STRD TFRM EPI L/S 1: CPT | Mod: RT,,, | Performed by: ANESTHESIOLOGY

## 2021-12-21 PROCEDURE — 25000003 PHARM REV CODE 250: Performed by: STUDENT IN AN ORGANIZED HEALTH CARE EDUCATION/TRAINING PROGRAM

## 2021-12-21 PROCEDURE — 25500020 PHARM REV CODE 255: Performed by: ANESTHESIOLOGY

## 2021-12-21 PROCEDURE — 63600175 PHARM REV CODE 636 W HCPCS: Performed by: ANESTHESIOLOGY

## 2021-12-21 PROCEDURE — 64483 PR EPIDURAL INJ, ANES/STEROID, TRANSFORAMINAL, LUMB/SACR, SNGL LEVL: ICD-10-PCS | Mod: RT,,, | Performed by: ANESTHESIOLOGY

## 2021-12-21 PROCEDURE — 64484 PRA INJECT ANES/STEROID FORAMEN LUMBAR/SACRAL W IMG GUIDE ,EA ADD LEVEL: ICD-10-PCS | Mod: RT,,, | Performed by: ANESTHESIOLOGY

## 2021-12-21 RX ORDER — MIDAZOLAM HYDROCHLORIDE 1 MG/ML
INJECTION INTRAMUSCULAR; INTRAVENOUS
Status: DISCONTINUED | OUTPATIENT
Start: 2021-12-21 | End: 2021-12-21 | Stop reason: HOSPADM

## 2021-12-21 RX ORDER — BUPIVACAINE HYDROCHLORIDE 2.5 MG/ML
INJECTION, SOLUTION EPIDURAL; INFILTRATION; INTRACAUDAL
Status: DISCONTINUED | OUTPATIENT
Start: 2021-12-21 | End: 2021-12-21 | Stop reason: HOSPADM

## 2021-12-21 RX ORDER — SODIUM CHLORIDE 9 MG/ML
INJECTION, SOLUTION INTRAVENOUS CONTINUOUS
Status: DISCONTINUED | OUTPATIENT
Start: 2021-12-21 | End: 2021-12-21 | Stop reason: HOSPADM

## 2021-12-21 RX ORDER — LIDOCAINE HYDROCHLORIDE 20 MG/ML
INJECTION, SOLUTION INFILTRATION; PERINEURAL
Status: DISCONTINUED | OUTPATIENT
Start: 2021-12-21 | End: 2021-12-21 | Stop reason: HOSPADM

## 2021-12-21 RX ORDER — DEXAMETHASONE SODIUM PHOSPHATE 10 MG/ML
INJECTION INTRAMUSCULAR; INTRAVENOUS
Status: DISCONTINUED | OUTPATIENT
Start: 2021-12-21 | End: 2021-12-21 | Stop reason: HOSPADM

## 2021-12-21 RX ORDER — FENTANYL CITRATE 50 UG/ML
INJECTION, SOLUTION INTRAMUSCULAR; INTRAVENOUS
Status: DISCONTINUED | OUTPATIENT
Start: 2021-12-21 | End: 2021-12-21 | Stop reason: HOSPADM

## 2021-12-28 ENCOUNTER — PATIENT MESSAGE (OUTPATIENT)
Dept: FAMILY MEDICINE | Facility: CLINIC | Age: 61
End: 2021-12-28
Payer: COMMERCIAL

## 2021-12-28 RX ORDER — ALPRAZOLAM 0.25 MG/1
0.25 TABLET ORAL DAILY PRN
COMMUNITY
End: 2021-12-28 | Stop reason: SDUPTHER

## 2021-12-29 ENCOUNTER — PATIENT MESSAGE (OUTPATIENT)
Dept: FAMILY MEDICINE | Facility: CLINIC | Age: 61
End: 2021-12-29
Payer: COMMERCIAL

## 2021-12-29 RX ORDER — ALPRAZOLAM 0.25 MG/1
0.25 TABLET ORAL DAILY PRN
Qty: 30 TABLET | Refills: 0 | Status: SHIPPED | OUTPATIENT
Start: 2021-12-29 | End: 2022-10-28

## 2022-01-05 DIAGNOSIS — I10 ESSENTIAL HYPERTENSION: ICD-10-CM

## 2022-01-06 RX ORDER — AMLODIPINE BESYLATE 10 MG/1
10 TABLET ORAL DAILY
Qty: 90 TABLET | Refills: 3 | Status: SHIPPED | OUTPATIENT
Start: 2022-01-06 | End: 2022-12-29 | Stop reason: SDUPTHER

## 2022-01-06 NOTE — TELEPHONE ENCOUNTER
No new care gaps identified.  Powered by [employer]. Reference number: [id number].   1/05/2022 8:41:08 PM CST

## 2022-01-19 ENCOUNTER — PATIENT MESSAGE (OUTPATIENT)
Dept: FAMILY MEDICINE | Facility: CLINIC | Age: 62
End: 2022-01-19
Payer: COMMERCIAL

## 2022-01-20 ENCOUNTER — PATIENT MESSAGE (OUTPATIENT)
Dept: FAMILY MEDICINE | Facility: CLINIC | Age: 62
End: 2022-01-20
Payer: COMMERCIAL

## 2022-02-21 ENCOUNTER — PATIENT MESSAGE (OUTPATIENT)
Dept: INTERNAL MEDICINE | Facility: CLINIC | Age: 62
End: 2022-02-21
Payer: COMMERCIAL

## 2022-02-22 ENCOUNTER — PATIENT MESSAGE (OUTPATIENT)
Dept: INTERNAL MEDICINE | Facility: CLINIC | Age: 62
End: 2022-02-22
Payer: COMMERCIAL

## 2022-02-22 RX ORDER — DICYCLOMINE HYDROCHLORIDE 10 MG/1
10 CAPSULE ORAL 4 TIMES DAILY PRN
Qty: 30 CAPSULE | Refills: 0 | Status: SHIPPED | OUTPATIENT
Start: 2022-02-22 | End: 2022-03-24

## 2022-02-28 ENCOUNTER — OFFICE VISIT (OUTPATIENT)
Dept: ORTHOPEDICS | Facility: CLINIC | Age: 62
End: 2022-02-28
Payer: COMMERCIAL

## 2022-02-28 VITALS — HEIGHT: 62 IN | BODY MASS INDEX: 20.82 KG/M2 | WEIGHT: 113.13 LBS

## 2022-02-28 DIAGNOSIS — M54.16 LUMBAR RADICULOPATHY: Primary | ICD-10-CM

## 2022-02-28 PROCEDURE — 99213 OFFICE O/P EST LOW 20 MIN: CPT | Mod: S$GLB,,, | Performed by: ORTHOPAEDIC SURGERY

## 2022-02-28 PROCEDURE — 1159F PR MEDICATION LIST DOCUMENTED IN MEDICAL RECORD: ICD-10-PCS | Mod: CPTII,S$GLB,, | Performed by: ORTHOPAEDIC SURGERY

## 2022-02-28 PROCEDURE — 3008F PR BODY MASS INDEX (BMI) DOCUMENTED: ICD-10-PCS | Mod: CPTII,S$GLB,, | Performed by: ORTHOPAEDIC SURGERY

## 2022-02-28 PROCEDURE — 99999 PR PBB SHADOW E&M-EST. PATIENT-LVL III: ICD-10-PCS | Mod: PBBFAC,,, | Performed by: ORTHOPAEDIC SURGERY

## 2022-02-28 PROCEDURE — 3008F BODY MASS INDEX DOCD: CPT | Mod: CPTII,S$GLB,, | Performed by: ORTHOPAEDIC SURGERY

## 2022-02-28 PROCEDURE — 1159F MED LIST DOCD IN RCRD: CPT | Mod: CPTII,S$GLB,, | Performed by: ORTHOPAEDIC SURGERY

## 2022-02-28 PROCEDURE — 99999 PR PBB SHADOW E&M-EST. PATIENT-LVL III: CPT | Mod: PBBFAC,,, | Performed by: ORTHOPAEDIC SURGERY

## 2022-02-28 PROCEDURE — 99213 PR OFFICE/OUTPT VISIT, EST, LEVL III, 20-29 MIN: ICD-10-PCS | Mod: S$GLB,,, | Performed by: ORTHOPAEDIC SURGERY

## 2022-02-28 NOTE — PROGRESS NOTES
DATE: 2/28/2022  PATIENT: Sarahy Ma    Attending Physician: Rolly Arriaza MD    HISTORY:  Sarahy Ma is a 61 y.o. female who returns to me today for follow up.  She was last seen by me 11/3/2021.  Today she is doing well but notes she had a right L2-3 L3-4 TFESI on 12/21/21 with a few hours of relief. She continues to have chronic right sided low back pain with intermittent burning pain in the side/front of her right thigh.    The Patient denies myelopathic symptoms such as handwriting changes or difficulty with buttons/coins/keys. Denies perineal paresthesias, bowel/bladder dysfunction.    PMH/PSH/FamHx/SocHx:  Unchanged from prior visit    ROS:  REVIEW OF SYSTEMS:  Constitution: Negative. Negative for chills, fever and night sweats.   HENT: Negative for congestion and headaches.    Eyes: Negative for blurred vision, left vision loss and right vision loss.   Cardiovascular: Negative for chest pain and syncope.   Respiratory: Negative for cough and shortness of breath.    Endocrine: Negative for polydipsia, polyphagia and polyuria.   Hematologic/Lymphatic: Negative for bleeding problem. Does not bruise/bleed easily.   Skin: Negative for dry skin, itching and rash.   Musculoskeletal: Negative for falls and muscle weakness.   Gastrointestinal: Negative for abdominal pain and bowel incontinence.   Allergic/Immunologic: Negative for hives and persistent infections.  Genitourinary: Negative for urinary retention/incontinence and nocturia.   Neurological: negative for disturbances in coordination, no myelopathic symptoms such as handwriting changes or difficulty with buttons, coins, keys or small objects. No loss of balance and seizures.   Psychiatric/Behavioral: Negative for depression. The patient does not have insomnia.   Denies perineal paresthesias, bowel or bladder incontinence    EXAM:  There were no vitals taken for this visit.    My physical examination was notable for the following findings:      Musculoskeletal and neuro exam stable.      IMAGING:  No new imaging today.    Today I personally re-reviewed AP, Lat and Flex/Ex  upright L-spine films that demonstrate significant degenerative changes without instability.     MRI lumbar demonstrates degenerative changes resulting in moderate central stenosis at L3-L4 with some neural foraminal narrowing from L3-S1.       There is no height or weight on file to calculate BMI.    Hemoglobin A1C   Date Value Ref Range Status   11/09/2020 5.2 4.0 - 5.6 % Final     Comment:     ADA Screening Guidelines:  5.7-6.4%  Consistent with prediabetes  >or=6.5%  Consistent with diabetes  High levels of fetal hemoglobin interfere with the HbA1C  assay. Heterozygous hemoglobin variants (HbS, HgC, etc)do  not significantly interfere with this assay.   However, presence of multiple variants may affect accuracy.           ASSESSMENT/PLAN:    There are no diagnoses linked to this encounter.    Today we discussed at length all of the different treatment options including anti-inflammatories, acetaminophen, rest, ice, heat, physical therapy including strengthening and stretching exercises, home exercises, ROM, aerobic conditioning, aqua therapy, other modalities including ultrasound, massage, and dry needling, epidural steroid injections and finally surgical intervention.      Pt presents with chronic right lumbar radiculopathy. Will order RLE EMG to further evaluate and call with results.

## 2022-03-13 DIAGNOSIS — Z12.31 OTHER SCREENING MAMMOGRAM: ICD-10-CM

## 2022-03-17 ENCOUNTER — PATIENT MESSAGE (OUTPATIENT)
Dept: ORTHOPEDICS | Facility: CLINIC | Age: 62
End: 2022-03-17
Payer: COMMERCIAL

## 2022-03-17 RX ORDER — GABAPENTIN 100 MG/1
100 CAPSULE ORAL NIGHTLY
Qty: 30 CAPSULE | Refills: 11 | Status: SHIPPED | OUTPATIENT
Start: 2022-03-17 | End: 2022-04-22

## 2022-03-17 RX ORDER — GABAPENTIN 100 MG/1
100 CAPSULE ORAL NIGHTLY
Qty: 30 CAPSULE | Refills: 11 | Status: SHIPPED | OUTPATIENT
Start: 2022-03-17 | End: 2022-03-17

## 2022-03-30 ENCOUNTER — PATIENT MESSAGE (OUTPATIENT)
Dept: INTERNAL MEDICINE | Facility: CLINIC | Age: 62
End: 2022-03-30
Payer: COMMERCIAL

## 2022-03-30 ENCOUNTER — PATIENT MESSAGE (OUTPATIENT)
Dept: ORTHOPEDICS | Facility: CLINIC | Age: 62
End: 2022-03-30
Payer: COMMERCIAL

## 2022-03-31 ENCOUNTER — PATIENT MESSAGE (OUTPATIENT)
Dept: INTERNAL MEDICINE | Facility: CLINIC | Age: 62
End: 2022-03-31
Payer: COMMERCIAL

## 2022-03-31 ENCOUNTER — PATIENT MESSAGE (OUTPATIENT)
Dept: ORTHOPEDICS | Facility: CLINIC | Age: 62
End: 2022-03-31
Payer: COMMERCIAL

## 2022-03-31 DIAGNOSIS — E89.0 POSTABLATIVE HYPOTHYROIDISM: Primary | ICD-10-CM

## 2022-04-15 ENCOUNTER — PATIENT MESSAGE (OUTPATIENT)
Dept: INTERNAL MEDICINE | Facility: CLINIC | Age: 62
End: 2022-04-15
Payer: COMMERCIAL

## 2022-04-17 ENCOUNTER — PATIENT MESSAGE (OUTPATIENT)
Dept: INTERNAL MEDICINE | Facility: CLINIC | Age: 62
End: 2022-04-17
Payer: COMMERCIAL

## 2022-04-18 ENCOUNTER — PATIENT MESSAGE (OUTPATIENT)
Dept: INTERNAL MEDICINE | Facility: CLINIC | Age: 62
End: 2022-04-18
Payer: COMMERCIAL

## 2022-04-22 ENCOUNTER — OFFICE VISIT (OUTPATIENT)
Dept: INTERNAL MEDICINE | Facility: CLINIC | Age: 62
End: 2022-04-22
Payer: COMMERCIAL

## 2022-04-22 VITALS
HEIGHT: 62 IN | SYSTOLIC BLOOD PRESSURE: 122 MMHG | WEIGHT: 114.19 LBS | OXYGEN SATURATION: 97 % | HEART RATE: 68 BPM | DIASTOLIC BLOOD PRESSURE: 80 MMHG | TEMPERATURE: 98 F | BODY MASS INDEX: 21.02 KG/M2

## 2022-04-22 DIAGNOSIS — I10 ESSENTIAL HYPERTENSION: ICD-10-CM

## 2022-04-22 DIAGNOSIS — K51.80 OTHER ULCERATIVE COLITIS WITHOUT COMPLICATION: ICD-10-CM

## 2022-04-22 DIAGNOSIS — Z85.810 HISTORY OF TONGUE CANCER: ICD-10-CM

## 2022-04-22 DIAGNOSIS — R06.02 SHORTNESS OF BREATH: ICD-10-CM

## 2022-04-22 DIAGNOSIS — E89.0 POSTABLATIVE HYPOTHYROIDISM: ICD-10-CM

## 2022-04-22 DIAGNOSIS — R51.9 RIGHT-SIDED FACE PAIN: Primary | ICD-10-CM

## 2022-04-22 PROCEDURE — 99999 PR PBB SHADOW E&M-EST. PATIENT-LVL IV: ICD-10-PCS | Mod: PBBFAC,,, | Performed by: INTERNAL MEDICINE

## 2022-04-22 PROCEDURE — 99999 PR PBB SHADOW E&M-EST. PATIENT-LVL IV: CPT | Mod: PBBFAC,,, | Performed by: INTERNAL MEDICINE

## 2022-04-22 PROCEDURE — 99214 PR OFFICE/OUTPT VISIT, EST, LEVL IV, 30-39 MIN: ICD-10-PCS | Mod: S$GLB,,, | Performed by: INTERNAL MEDICINE

## 2022-04-22 PROCEDURE — 1159F MED LIST DOCD IN RCRD: CPT | Mod: CPTII,S$GLB,, | Performed by: INTERNAL MEDICINE

## 2022-04-22 PROCEDURE — 3008F PR BODY MASS INDEX (BMI) DOCUMENTED: ICD-10-PCS | Mod: CPTII,S$GLB,, | Performed by: INTERNAL MEDICINE

## 2022-04-22 PROCEDURE — 1159F PR MEDICATION LIST DOCUMENTED IN MEDICAL RECORD: ICD-10-PCS | Mod: CPTII,S$GLB,, | Performed by: INTERNAL MEDICINE

## 2022-04-22 PROCEDURE — 3079F DIAST BP 80-89 MM HG: CPT | Mod: CPTII,S$GLB,, | Performed by: INTERNAL MEDICINE

## 2022-04-22 PROCEDURE — 99214 OFFICE O/P EST MOD 30 MIN: CPT | Mod: S$GLB,,, | Performed by: INTERNAL MEDICINE

## 2022-04-22 PROCEDURE — 3074F PR MOST RECENT SYSTOLIC BLOOD PRESSURE < 130 MM HG: ICD-10-PCS | Mod: CPTII,S$GLB,, | Performed by: INTERNAL MEDICINE

## 2022-04-22 PROCEDURE — 3008F BODY MASS INDEX DOCD: CPT | Mod: CPTII,S$GLB,, | Performed by: INTERNAL MEDICINE

## 2022-04-22 PROCEDURE — 3074F SYST BP LT 130 MM HG: CPT | Mod: CPTII,S$GLB,, | Performed by: INTERNAL MEDICINE

## 2022-04-22 PROCEDURE — 3079F PR MOST RECENT DIASTOLIC BLOOD PRESSURE 80-89 MM HG: ICD-10-PCS | Mod: CPTII,S$GLB,, | Performed by: INTERNAL MEDICINE

## 2022-04-22 RX ORDER — LEVOTHYROXINE SODIUM 75 UG/1
75 TABLET ORAL
Qty: 90 TABLET | Refills: 3 | Status: SHIPPED | OUTPATIENT
Start: 2022-04-22 | End: 2023-05-26 | Stop reason: SDUPTHER

## 2022-04-22 NOTE — PROGRESS NOTES
[facility]    Chief Complaint      Chief Complaint   Patient presents with    Follow-up     History of Present Illness      Sarahy Ma is a 61 y.o. female who presents today for follow up of HTN.  Patient comes to appointment alone.    Two friends  from cancer, daughter in toxic relationship and is now pregnant.  Very stressed, has not taken xanax.    Having left sided facial pain, had xrays with dentist (salivary glands were slightly abnormal), put on abx by her dentist prior to leaving for her [place] trip.  No change in pain after 7 days of abx.  Then started with neck pain, went to chiropractor who thought she had some LAD.  Hx of tongue cancer, has not seen oncology recently, no recent imaging.    Problem List Items Addressed This Visit     Postablative hypothyroidism    Current Assessment & Plan     On mesalamine, works well for her.  Sees Dr. Forte.  Cscope 2021.           Ulcerative colitis    Current Assessment & Plan     On mesalamine, works well for her.  Sees Dr. Forte.  Cscope 2021.           History of tongue cancer    Overview     Diagnosed/treated at [facility] in .  Reportedly Stage 3 at time of diagnosis => treated with XRT/chemotherapy.           Current Assessment & Plan     Sees Dr. Dobbins, sees annually.  No longer needs scans.           Essential hypertension    Current Assessment & Plan     BP controlled on Norvasc 10 mg, no SOB/CP/HA.           Shortness of breath    Current Assessment & Plan     Breathing has been much better since starting Protonix, no cough.                 Health Maintenance   Topic Date Due    Mammogram  2023    Lipid Panel  10/26/2026    TETANUS VACCINE  2027    Hepatitis C Screening  Completed       Past Medical History:   Diagnosis Date    Hypertension     Postablative hypothyroidism     secondary to the radiation treatment for tongue cancer - treated by oncologist    Tongue cancer     Ulcerative  colitis     Treated by Dr. Tobias    Ulcerative colitis        Past Surgical History:   Procedure Laterality Date     SECTION      CHOLECYSTECTOMY  2016    removed    CHOLECYSTECTOMY  2016    COLONOSCOPY      Dr. Tobias    COLONOSCOPY N/A 2021    Procedure: COLONOSCOPY Suprep;  Surgeon: Chetan Forte MD;  Location: [facility];  Service: Endoscopy;  Laterality: N/A;    feeding tube      groin surgery  2017    swollen lump node    HYSTERECTOMY      OOPHORECTOMY      PORTACATH PLACEMENT      TONSILLECTOMY      TRANSFORAMINAL EPIDURAL INJECTION OF STEROID Right 2021    Procedure: LUMBAR TRANSFORAMINAL RIGHT L2/3 AND L3/4 DIRECT REFERRAL ;  Surgeon: Tana Beckwith MD;  Location: [facility];  Service: Pain Management;  Laterality: Right;    TUBAL LIGATION         family history includes Arthritis in her father and mother; Cancer in her paternal grandmother; Diabetes in her father; Early death in her brother; Heart attack in her father; Heart disease in her father; Heart failure in her father; Hyperlipidemia in her father; Hypertension in her father and mother; Thyroid disease in her brother, brother, sister, and sister.    Social History     Tobacco Use    Smoking status: Former Smoker     Packs/day: 1.00     Years: 19.00     Pack years: 19.00     Types: Cigarettes     Start date: 1980     Quit date: 5/15/2009     Years since quittin.9    Smokeless tobacco: Never Used   Substance Use Topics    Alcohol use: Not Currently     Alcohol/week: 0.0 standard drinks     Comment: occasionally    Drug use: No       Review of Systems   Constitutional: Negative for chills and fever.   HENT: Negative for hearing loss and sore throat.    Eyes: Negative for discharge.   Respiratory: Negative for cough, shortness of breath and wheezing.    Cardiovascular: Negative for chest pain and palpitations.   Gastrointestinal: Negative for blood in stool, constipation,  diarrhea, nausea and vomiting.   Genitourinary: Negative for dysuria and hematuria.   Musculoskeletal: Positive for neck pain. Negative for falls.   Neurological: Negative for weakness and headaches.   Endo/Heme/Allergies: Negative for polydipsia.        Outpatient Encounter Medications as of 4/22/2022   Medication Sig Dispense Refill    ALPRAZolam (XANAX) 0.25 MG tablet Take 1 tablet (0.25 mg total) by mouth daily as needed (anxiety). 30 tablet 0    amLODIPine (NORVASC) 10 MG tablet Take 1 tablet (10 mg total) by mouth once daily. 90 tablet 3    fluticasone furoate-vilanteroL (BREO ELLIPTA) 100-25 mcg/dose diskus inhaler Inhale 1 puff into the lungs once daily. Controller 1 each 5    meclizine (ANTIVERT) 25 mg tablet Take 1 tablet (25 mg total) by mouth 3 (three) times daily as needed for Dizziness. 20 tablet 0    mesalamine (LIALDA) 1.2 gram TbEC Take 1 tablet (1.2 g total) by mouth 2 (two) times daily. 60 tablet 6    ondansetron (ZOFRAN-ODT) 4 MG TbDL Take 1 tablet (4 mg total) by mouth every 8 (eight) hours as needed (nausea). 10 tablet 0    sucralfate (CARAFATE) 1 gram tablet Take 1 tablet (1 g total) by mouth 3 (three) times daily as needed. 90 tablet 1    SYNTHROID 75 mcg tablet Take 1 tablet (75 mcg total) by mouth before breakfast. 90 tablet 3    [DISCONTINUED] gabapentin (NEURONTIN) 100 MG capsule Take 1 capsule (100 mg total) by mouth every evening. (Patient not taking: Reported on 4/22/2022) 30 capsule 11    [DISCONTINUED] pantoprazole (PROTONIX) 40 MG tablet Take 1 tablet (40 mg total) by mouth once daily. (Patient not taking: Reported on 4/22/2022) 30 tablet 3     No facility-administered encounter medications on file as of 4/22/2022.        Review of patient's allergies indicates:   Allergen Reactions    Amoxicillin      Other reaction(s): fever blisters    Nsaids (non-steroidal anti-inflammatory drug)      Other reaction(s): nausea, stomach issues       Physical Exam      Vital  "Signs  Temp: 97.8 °F (36.6 °C)  Pulse: 68  SpO2: 97 %  BP: 122/80  Pain Score: 0-No pain  Height and Weight  Height: 5' 2" (157.5 cm)  Weight: 51.8 kg (114 lb 3.2 oz)  BSA (Calculated - sq m): 1.51 sq meters  BMI (Calculated): 20.9  Weight in (lb) to have BMI = 25: 136.4]    Physical Exam  Constitutional:       Appearance: She is well-developed.   HENT:      Head: Normocephalic and atraumatic.      Right Ear: External ear normal.      Left Ear: External ear normal.   Eyes:      General:         Right eye: No discharge.         Left eye: No discharge.   Cardiovascular:      Rate and Rhythm: Normal rate and regular rhythm.      Heart sounds: Normal heart sounds. No murmur heard.  Pulmonary:      Effort: Pulmonary effort is normal. No respiratory distress.      Breath sounds: Normal breath sounds.   Abdominal:      General: There is no distension.      Palpations: Abdomen is soft.      Tenderness: There is no abdominal tenderness. There is no guarding.   Musculoskeletal:         General: Normal range of motion.      Cervical back: Normal range of motion.   Skin:     General: Skin is warm and dry.   Neurological:      Mental Status: She is alert and oriented to person, place, and time.   Psychiatric:         Behavior: Behavior normal.          Laboratory:  CBC:  No results for input(s): WBC, RBC, HGB, HCT, PLT, MCV, MCH, MCHC in the last 2160 hours.  CMP:  No results for input(s): GLU, CALCIUM, ALBUMIN, PROT, NA, K, CO2, CL, BUN, ALKPHOS, ALT, AST, BILITOT in the last 2160 hours.    Invalid input(s): CREATININ  URINALYSIS:  No results for input(s): COLORU, CLARITYU, SPECGRAV, PHUR, PROTEINUA, GLUCOSEU, BILIRUBINCON, BLOODU, WBCU, RBCU, BACTERIA, MUCUS, NITRITE, LEUKOCYTESUR, UROBILINOGEN, HYALINECASTS in the last 2160 hours.   LIPIDS:  Recent Labs   Lab Result Units 04/19/22  0812   TSH uIU/mL 2.810     TSH:  Recent Labs   Lab Result Units 04/19/22  0812   TSH uIU/mL 2.810     A1C:  No results for input(s): HGBA1C in " the last 2160 hours.    Radiology:  Mammo Digital Screening Bilat w/ Oren    Result Date: 3/24/2022  Result:  Mammo Digital Screening Bilat w/ Oren    History:  Patient is 61 y.o. and is seen for a screening mammogram.      Films Compared:  Compared to: 02/26/2021 Mammo Digital Screening Bilat w/ Oren and   02/17/2020 Mammo Digital Screening Bilat w/ Oren     Findings:   This procedure was performed using tomosynthesis.   Computer-aided detection was utilized in the interpretation of this   examination.    The breasts are heterogeneously dense, which may obscure small masses.   There is no evidence of suspicious masses, microcalcifications or   architectural distortion.           Assessment/Plan     Sarahy Ma is a 61 y.o.female with:    1. Postablative hypothyroidism    2. Essential hypertension    3. Shortness of breath    4. History of tongue cancer    5. Other ulcerative colitis without complication      -Continue current medications and maintain follow up with specialists.    -Follow up in about 6 months (around 10/22/2022) for follow up of medical problems.       Barb Dixon MD  [facility]                  Answers for HPI/ROS submitted by the patient on 4/15/2022  activity change: No  unexpected weight change: No  rhinorrhea: No  trouble swallowing: No  visual disturbance: No  chest tightness: No  polyuria: Yes  difficulty urinating: No  menstrual problem: No  joint swelling: No  arthralgias: Yes  confusion: No  dysphoric mood: No

## 2022-04-22 NOTE — ASSESSMENT & PLAN NOTE
On mesalamine, works well for her.  Sees Dr. Forte.  Cscope 2/2021.  Has had mild flares over last few weeks.

## 2022-04-29 DIAGNOSIS — R51.9 RIGHT-SIDED FACE PAIN: Primary | ICD-10-CM

## 2022-05-02 ENCOUNTER — PATIENT MESSAGE (OUTPATIENT)
Dept: INTERNAL MEDICINE | Facility: CLINIC | Age: 62
End: 2022-05-02
Payer: COMMERCIAL

## 2022-05-10 ENCOUNTER — TELEPHONE (OUTPATIENT)
Dept: GASTROENTEROLOGY | Facility: CLINIC | Age: 62
End: 2022-05-10
Payer: COMMERCIAL

## 2022-05-10 DIAGNOSIS — K51.80 OTHER ULCERATIVE COLITIS WITHOUT COMPLICATION: Primary | ICD-10-CM

## 2022-05-10 RX ORDER — MESALAMINE 0.38 G/1
1.5 CAPSULE, EXTENDED RELEASE ORAL DAILY
Qty: 120 CAPSULE | Refills: 11 | Status: SHIPPED | OUTPATIENT
Start: 2022-05-10 | End: 2022-10-28

## 2022-05-10 NOTE — TELEPHONE ENCOUNTER
Informed by pharmacy that lialda not covered by her insurance    Will send apriso as this is covered

## 2022-05-12 ENCOUNTER — TELEPHONE (OUTPATIENT)
Dept: GASTROENTEROLOGY | Facility: CLINIC | Age: 62
End: 2022-05-12
Payer: COMMERCIAL

## 2022-05-12 NOTE — TELEPHONE ENCOUNTER
----- Message from Autumn Martin sent at 5/12/2022  9:20 AM CDT -----  Regarding: call back  Contact: [phone]  Who Called: PT     Patient is calling to talk to nurse in regards to her medication is very expensive and would like to see if there anything Dr can do for mesalamine (LIALDA) 1.2 gram TbEC 60 tablet.

## 2022-05-12 NOTE — TELEPHONE ENCOUNTER
Informed patient that her insurance company no longer covers the Lialda. [facility]. I informed patient that if she wants to change the Apriso she would have to follow up with MD.

## 2022-06-06 ENCOUNTER — PATIENT MESSAGE (OUTPATIENT)
Dept: INTERNAL MEDICINE | Facility: CLINIC | Age: 62
End: 2022-06-06
Payer: COMMERCIAL

## 2022-06-07 ENCOUNTER — PATIENT MESSAGE (OUTPATIENT)
Dept: INTERNAL MEDICINE | Facility: CLINIC | Age: 62
End: 2022-06-07
Payer: COMMERCIAL

## 2022-06-08 ENCOUNTER — PATIENT MESSAGE (OUTPATIENT)
Dept: ORTHOPEDICS | Facility: CLINIC | Age: 62
End: 2022-06-08
Payer: COMMERCIAL

## 2022-06-09 ENCOUNTER — PATIENT MESSAGE (OUTPATIENT)
Dept: ORTHOPEDICS | Facility: CLINIC | Age: 62
End: 2022-06-09
Payer: COMMERCIAL

## 2022-06-30 ENCOUNTER — PATIENT MESSAGE (OUTPATIENT)
Dept: ORTHOPEDICS | Facility: CLINIC | Age: 62
End: 2022-06-30
Payer: COMMERCIAL

## 2022-07-01 DIAGNOSIS — M54.16 LUMBAR RADICULOPATHY: Primary | ICD-10-CM

## 2022-07-19 ENCOUNTER — PATIENT MESSAGE (OUTPATIENT)
Dept: ORTHOPEDICS | Facility: CLINIC | Age: 62
End: 2022-07-19
Payer: COMMERCIAL

## 2022-08-17 ENCOUNTER — TELEPHONE (OUTPATIENT)
Dept: NEUROLOGY | Facility: CLINIC | Age: 62
End: 2022-08-17
Payer: COMMERCIAL

## 2022-08-17 ENCOUNTER — TELEPHONE (OUTPATIENT)
Dept: GASTROENTEROLOGY | Facility: CLINIC | Age: 62
End: 2022-08-17
Payer: COMMERCIAL

## 2022-08-17 RX ORDER — PANTOPRAZOLE SODIUM 40 MG/1
40 TABLET, DELAYED RELEASE ORAL DAILY
Qty: 30 TABLET | Refills: 11 | Status: SHIPPED | OUTPATIENT
Start: 2022-08-17 | End: 2023-07-27

## 2022-08-17 NOTE — TELEPHONE ENCOUNTER
----- Message from German Lerner sent at 8/17/2022  1:59 PM CDT -----  Contact: Patient  The pt called and would like to schedule an appt     Please call her back at [phone]

## 2022-08-17 NOTE — TELEPHONE ENCOUNTER
Patient called to cancel the EMG Procedure that she was scheduled for on this Friday August 19th.  She stated that her  just had knee surgery and that he is needing her care.  She did not wish to reschedule at this time but said that she will give us a call when ready.

## 2022-08-30 ENCOUNTER — PATIENT MESSAGE (OUTPATIENT)
Dept: INTERNAL MEDICINE | Facility: CLINIC | Age: 62
End: 2022-08-30
Payer: COMMERCIAL

## 2022-09-14 ENCOUNTER — PATIENT MESSAGE (OUTPATIENT)
Dept: ORTHOPEDICS | Facility: CLINIC | Age: 62
End: 2022-09-14
Payer: COMMERCIAL

## 2022-09-27 ENCOUNTER — PATIENT MESSAGE (OUTPATIENT)
Dept: ORTHOPEDICS | Facility: CLINIC | Age: 62
End: 2022-09-27
Payer: COMMERCIAL

## 2022-09-27 NOTE — TELEPHONE ENCOUNTER
Hey!  We have sent patients to this outside facility, seems like they do get the procedures done in a timely manner, however getting the results can be difficult. I always advise the patients to bring a hardcopy of the results with them to clinic.    [facility]  [place]  Fax: [phone]

## 2022-09-28 DIAGNOSIS — M54.16 LUMBAR RADICULOPATHY: Primary | ICD-10-CM

## 2022-09-28 RX ORDER — GABAPENTIN 300 MG/1
300 CAPSULE ORAL 3 TIMES DAILY
Qty: 90 CAPSULE | Refills: 11 | Status: SHIPPED | OUTPATIENT
Start: 2022-09-28 | End: 2023-10-03 | Stop reason: SDUPTHER

## 2022-09-28 NOTE — TELEPHONE ENCOUNTER
Called patient in regards to her message about not being able to schedule her EMG Procedure in a timely fashion.  I asked if she intended on having her study performed at an outside facility per her message.  I did remind her that we had her scheduled for the EMG several times and she had to cancel for various reasons. We rescheduled her appointment for Nov. 18th at 8am per her request. She stated that her daughter was expecting and that she would rather schedule EMG for a date and time that she was sure that she would not have to cancel again. Patient was content with the new date. An appointment letter is being placed in the mail today.

## 2022-09-30 ENCOUNTER — TELEPHONE (OUTPATIENT)
Dept: NEUROLOGY | Facility: CLINIC | Age: 62
End: 2022-09-30
Payer: COMMERCIAL

## 2022-09-30 NOTE — TELEPHONE ENCOUNTER
----- Message from Michelle Carrero LPN sent at 9/20/2022 12:00 PM CDT -----  Regarding: Needs emg on the [facility]  Good afternoon,  The above patient needs an emg scheduled on the [facility] when you have time.    Thank you!    April    Left message to call office

## 2022-10-19 ENCOUNTER — PATIENT MESSAGE (OUTPATIENT)
Dept: ORTHOPEDICS | Facility: CLINIC | Age: 62
End: 2022-10-19
Payer: COMMERCIAL

## 2022-10-20 RX ORDER — METHYLPREDNISOLONE 4 MG/1
TABLET ORAL
Qty: 21 EACH | Refills: 0 | Status: SHIPPED | OUTPATIENT
Start: 2022-10-20 | End: 2022-10-28

## 2022-10-25 ENCOUNTER — PATIENT MESSAGE (OUTPATIENT)
Dept: INTERNAL MEDICINE | Facility: CLINIC | Age: 62
End: 2022-10-25
Payer: COMMERCIAL

## 2022-10-25 DIAGNOSIS — R30.0 DYSURIA: Primary | ICD-10-CM

## 2022-10-28 ENCOUNTER — OFFICE VISIT (OUTPATIENT)
Dept: INTERNAL MEDICINE | Facility: CLINIC | Age: 62
End: 2022-10-28
Payer: COMMERCIAL

## 2022-10-28 VITALS
WEIGHT: 119.25 LBS | HEART RATE: 73 BPM | BODY MASS INDEX: 21.94 KG/M2 | TEMPERATURE: 98 F | DIASTOLIC BLOOD PRESSURE: 76 MMHG | HEIGHT: 62 IN | SYSTOLIC BLOOD PRESSURE: 122 MMHG | OXYGEN SATURATION: 98 %

## 2022-10-28 DIAGNOSIS — K51.80 OTHER ULCERATIVE COLITIS WITHOUT COMPLICATION: ICD-10-CM

## 2022-10-28 DIAGNOSIS — Z85.810 HISTORY OF TONGUE CANCER: ICD-10-CM

## 2022-10-28 DIAGNOSIS — M79.605 LEFT LEG PAIN: Primary | ICD-10-CM

## 2022-10-28 DIAGNOSIS — E89.0 POSTABLATIVE HYPOTHYROIDISM: ICD-10-CM

## 2022-10-28 DIAGNOSIS — M51.36 DEGENERATIVE DISC DISEASE, LUMBAR: ICD-10-CM

## 2022-10-28 DIAGNOSIS — I10 ESSENTIAL HYPERTENSION: ICD-10-CM

## 2022-10-28 PROCEDURE — 3074F PR MOST RECENT SYSTOLIC BLOOD PRESSURE < 130 MM HG: ICD-10-PCS | Mod: CPTII,S$GLB,, | Performed by: INTERNAL MEDICINE

## 2022-10-28 PROCEDURE — 99214 OFFICE O/P EST MOD 30 MIN: CPT | Mod: S$GLB,,, | Performed by: INTERNAL MEDICINE

## 2022-10-28 PROCEDURE — 1159F PR MEDICATION LIST DOCUMENTED IN MEDICAL RECORD: ICD-10-PCS | Mod: CPTII,S$GLB,, | Performed by: INTERNAL MEDICINE

## 2022-10-28 PROCEDURE — 1159F MED LIST DOCD IN RCRD: CPT | Mod: CPTII,S$GLB,, | Performed by: INTERNAL MEDICINE

## 2022-10-28 PROCEDURE — 3078F DIAST BP <80 MM HG: CPT | Mod: CPTII,S$GLB,, | Performed by: INTERNAL MEDICINE

## 2022-10-28 PROCEDURE — 99999 PR PBB SHADOW E&M-EST. PATIENT-LVL III: CPT | Mod: PBBFAC,,, | Performed by: INTERNAL MEDICINE

## 2022-10-28 PROCEDURE — 3078F PR MOST RECENT DIASTOLIC BLOOD PRESSURE < 80 MM HG: ICD-10-PCS | Mod: CPTII,S$GLB,, | Performed by: INTERNAL MEDICINE

## 2022-10-28 PROCEDURE — 99999 PR PBB SHADOW E&M-EST. PATIENT-LVL III: ICD-10-PCS | Mod: PBBFAC,,, | Performed by: INTERNAL MEDICINE

## 2022-10-28 PROCEDURE — 3074F SYST BP LT 130 MM HG: CPT | Mod: CPTII,S$GLB,, | Performed by: INTERNAL MEDICINE

## 2022-10-28 PROCEDURE — 99214 PR OFFICE/OUTPT VISIT, EST, LEVL IV, 30-39 MIN: ICD-10-PCS | Mod: S$GLB,,, | Performed by: INTERNAL MEDICINE

## 2022-10-28 RX ORDER — NITROFURANTOIN 25; 75 MG/1; MG/1
100 CAPSULE ORAL 2 TIMES DAILY
Qty: 10 CAPSULE | Refills: 0 | Status: SHIPPED | OUTPATIENT
Start: 2022-10-28 | End: 2023-01-05 | Stop reason: SDUPTHER

## 2022-10-28 RX ORDER — GABAPENTIN 100 MG/1
100 CAPSULE ORAL 3 TIMES DAILY
Qty: 90 CAPSULE | Refills: 11 | Status: SHIPPED | OUTPATIENT
Start: 2022-10-28 | End: 2023-05-10

## 2022-10-28 NOTE — ASSESSMENT & PLAN NOTE
Previously seen by Dr. Wells, last MRI 10/2022, now seeing [facility].  Taking tylenol, avoiding NSAIDS 2/2 UC.     10/2021 MRI  Moderate canal stenosis at L3-L4 due to diffuse disc bulge, facet joint osseous hypertrophy and ligamentum flavum hypertrophy.  Adjacent endplate edema at L2-3 consistent with active degenerative disc disease.

## 2022-10-28 NOTE — PROGRESS NOTES
[facility] Note    Chief Complaint      Chief Complaint   Patient presents with    Follow-up       History of Present Illness      Sarahy Ma is a 62 y.o. female who presents today for follow up of HTN.  Patient comes to appointment alone.    Having burning/dull pain that wraps around right inner thigh and goes down leg.  Taking gabapentin 600 mg at night but this makes her groggy in AM so doesn't take during the day.  Has been able to walk for exercise because of pain. MRI L spine in 10/2021    Very stressed about new grandson who came early, boundaries with daughter who has issues.  Starting counseling on 11/8/22 with .    Problem List Items Addressed This Visit       Postablative hypothyroidism    Current Assessment & Plan     On mesalamine, works well for her.  Sees Dr. Forte.  Cscope 2/2021.         Ulcerative colitis    Current Assessment & Plan     On mesalamine, works well for her.  Sees Dr. Forte.  Cscope 2/2021.  Has been having flares lately because stress has been high.         History of tongue cancer    Overview     Diagnosed/treated at [facility] in 2009.  Reportedly Stage 3 at time of diagnosis => treated with XRT/chemotherapy.         Current Assessment & Plan     Sees Dr. Dobbins, sees annually.  No longer needs scans.         Degenerative disc disease, lumbar    Current Assessment & Plan     Previously seen by Dr. Wells, last MRI 10/2022, now seeing [facility].  Taking tylenol, avoiding NSAIDS 2/2 UC.     10/2021 MRI  Moderate canal stenosis at L3-L4 due to diffuse disc bulge, facet joint osseous hypertrophy and ligamentum flavum hypertrophy.  Adjacent endplate edema at L2-3 consistent with active degenerative disc disease.            Essential hypertension    Current Assessment & Plan     BP controlled on Norvasc 10 mg, no SOB/CP/HA.          Other Visit Diagnoses       Left leg pain    -  Primary    Relevant Medications    gabapentin (NEURONTIN) 100 MG  capsule            Health Maintenance   Topic Date Due    Mammogram  2023    TETANUS VACCINE  2027    Lipid Panel  10/26/2027    Hepatitis C Screening  Completed       Past Medical History:   Diagnosis Date    Hypertension     Postablative hypothyroidism     secondary to the radiation treatment for tongue cancer - treated by oncologist    Tongue cancer     Ulcerative colitis     Treated by Dr. Tobias    Ulcerative colitis        Past Surgical History:   Procedure Laterality Date     SECTION      CHOLECYSTECTOMY  2016    removed    CHOLECYSTECTOMY  2016    COLONOSCOPY      Dr. Tobias    COLONOSCOPY N/A 2021    Procedure: COLONOSCOPY Suprep;  Surgeon: Chetan Forte MD;  Location: [facility];  Service: Endoscopy;  Laterality: N/A;    feeding tube      groin surgery  2017    swollen lump node    HYSTERECTOMY      OOPHORECTOMY      PORTACATH PLACEMENT      TONSILLECTOMY      TRANSFORAMINAL EPIDURAL INJECTION OF STEROID Right 2021    Procedure: LUMBAR TRANSFORAMINAL RIGHT L2/3 AND L3/4 DIRECT REFERRAL ;  Surgeon: Tana Beckwith MD;  Location: [facility];  Service: Pain Management;  Laterality: Right;    TUBAL LIGATION         family history includes Arthritis in her father and mother; Cancer in her paternal grandmother; Diabetes in her father; Early death in her brother; Heart attack in her father; Heart disease in her father; Heart failure in her father; Hyperlipidemia in her father; Hypertension in her father and mother; Thyroid disease in her brother, brother, sister, and sister.    Social History     Tobacco Use    Smoking status: Former     Packs/day: 1.00     Years: 19.00     Pack years: 19.00     Types: Cigarettes     Start date: 1980     Quit date: 5/15/2009     Years since quittin.4    Smokeless tobacco: Never   Substance Use Topics    Alcohol use: Not Currently     Comment: occasionally    Drug use: No       Review of Systems   Constitutional:   Negative for chills and fever.   HENT:  Negative for hearing loss and sore throat.    Eyes:  Negative for discharge.   Respiratory:  Negative for cough, shortness of breath and wheezing.    Cardiovascular:  Negative for chest pain and palpitations.   Gastrointestinal:  Negative for blood in stool, constipation, diarrhea, nausea and vomiting.   Genitourinary:  Negative for dysuria and hematuria.   Musculoskeletal:  Positive for neck pain. Negative for falls.   Neurological:  Negative for weakness and headaches.   Endo/Heme/Allergies:  Negative for polydipsia.      Outpatient Encounter Medications as of 10/28/2022   Medication Sig Dispense Refill    amLODIPine (NORVASC) 10 MG tablet Take 1 tablet (10 mg total) by mouth once daily. 90 tablet 3    BREO ELLIPTA 100-25 mcg/dose diskus inhaler INHALE 1 PUFF INTO THE LUNGS ONCE DAILY 180 each 2    gabapentin (NEURONTIN) 300 MG capsule Take 1 capsule (300 mg total) by mouth 3 (three) times daily. (Patient taking differently: Take 600 mg by mouth every evening.) 90 capsule 11    meclizine (ANTIVERT) 25 mg tablet Take 1 tablet (25 mg total) by mouth 3 (three) times daily as needed for Dizziness. 20 tablet 0    mesalamine (LIALDA) 1.2 gram TbEC Take 1 tablet (1.2 g total) by mouth 2 (two) times daily. (Patient taking differently: Take 1.2 g by mouth 3 (three) times daily.) 60 tablet 6    ondansetron (ZOFRAN-ODT) 4 MG TbDL Take 1 tablet (4 mg total) by mouth every 8 (eight) hours as needed (nausea). 10 tablet 0    pantoprazole (PROTONIX) 40 MG tablet Take 1 tablet (40 mg total) by mouth once daily. 30 tablet 11    SYNTHROID 75 mcg tablet Take 1 tablet (75 mcg total) by mouth before breakfast. 90 tablet 3    gabapentin (NEURONTIN) 100 MG capsule Take 1 capsule (100 mg total) by mouth 3 (three) times daily. 90 capsule 11    nitrofurantoin, macrocrystal-monohydrate, (MACROBID) 100 MG capsule Take 1 capsule (100 mg total) by mouth 2 (two) times daily. 10 capsule 0    [DISCONTINUED]  "ALPRAZolam (XANAX) 0.25 MG tablet Take 1 tablet (0.25 mg total) by mouth daily as needed (anxiety). (Patient not taking: Reported on 10/28/2022) 30 tablet 0    [DISCONTINUED] mesalamine (APRISO) 0.375 gram Cp24 Take 4 capsules (1.5 g total) by mouth once daily. (Patient not taking: Reported on 10/28/2022) 120 capsule 11    [DISCONTINUED] methylPREDNISolone (MEDROL DOSEPACK) 4 mg tablet use as directed (Patient not taking: Reported on 10/28/2022) 21 each 0    [DISCONTINUED] sucralfate (CARAFATE) 1 gram tablet Take 1 tablet (1 g total) by mouth 3 (three) times daily as needed. 90 tablet 1     No facility-administered encounter medications on file as of 10/28/2022.        Review of patient's allergies indicates:   Allergen Reactions    Amoxicillin      Other reaction(s): fever blisters    Nsaids (non-steroidal anti-inflammatory drug)      Other reaction(s): nausea, stomach issues       Physical Exam      Vital Signs  Temp: 97.9 °F (36.6 °C)  Pulse: 73  SpO2: 98 %  BP: 122/76  Pain Score: 0-No pain  Height and Weight  Height: 5' 2" (157.5 cm)  Weight: 54.1 kg (119 lb 4.3 oz)  BSA (Calculated - sq m): 1.54 sq meters  BMI (Calculated): 21.8  Weight in (lb) to have BMI = 25: 136.4]    Physical Exam  Constitutional:       Appearance: She is well-developed.   HENT:      Head: Normocephalic and atraumatic.      Right Ear: External ear normal.      Left Ear: External ear normal.   Eyes:      General:         Right eye: No discharge.         Left eye: No discharge.   Cardiovascular:      Rate and Rhythm: Normal rate and regular rhythm.      Heart sounds: Normal heart sounds. No murmur heard.  Pulmonary:      Effort: Pulmonary effort is normal. No respiratory distress.      Breath sounds: Normal breath sounds.   Abdominal:      General: There is no distension.      Palpations: Abdomen is soft.      Tenderness: There is no abdominal tenderness. There is no guarding.   Musculoskeletal:         General: Normal range of motion.      " Cervical back: Normal range of motion.   Skin:     General: Skin is warm and dry.   Neurological:      Mental Status: She is alert and oriented to person, place, and time.   Psychiatric:         Behavior: Behavior normal.        Laboratory:  CBC:  Recent Labs   Lab Result Units 10/26/22  0719   WBC K/uL 3.96   RBC M/uL 4.42   Hemoglobin g/dL 13.4   Hematocrit % 39.9   Platelets K/uL 205   MCV fL 90   MCH pg 30.3   MCHC g/dL 33.6     CMP:  Recent Labs   Lab Result Units 10/26/22  0719   Glucose mg/dL 98   Calcium mg/dL 8.9   Albumin g/dL 4.3   Total Protein g/dL 7.2   Sodium mmol/L 141   Potassium mmol/L 3.6   CO2 mmol/L 28   Chloride mmol/L 104   BUN mg/dL 15   Alkaline Phosphatase U/L 76   ALT U/L 20   AST U/L 26   Total Bilirubin mg/dL 0.6     URINALYSIS:  Recent Labs   Lab Result Units 10/26/22  0718   Color, UA  Yellow   Specific Gravity, UA  1.020   pH, UA  7.0   Protein, UA  Negative   Bacteria /hpf Moderate*   Nitrite, UA  Negative   Leukocytes, UA  Trace*   Urobilinogen, UA EU/dL Negative      LIPIDS:  Recent Labs   Lab Result Units 10/26/22  0719   TSH uIU/mL 2.760   HDL mg/dL 77*   Cholesterol mg/dL 217*   Triglycerides mg/dL 86   LDL Cholesterol mg/dL 122.8   HDL/Cholesterol Ratio % 35.5   Non-HDL Cholesterol mg/dL 140   Total Cholesterol/HDL Ratio  2.8     TSH:  Recent Labs   Lab Result Units 10/26/22  0719   TSH uIU/mL 2.760     A1C:  No results for input(s): HGBA1C in the last 2160 hours.    Radiology:  Mammo Digital Screening Bilat w/ Oren    Result Date: 3/24/2022  Result:  Mammo Digital Screening Bilat w/ Oren    History:  Patient is 61 y.o. and is seen for a screening mammogram.      Films Compared:  Compared to: 02/26/2021 Mammo Digital Screening Bilat w/ Oren and   02/17/2020 Mammo Digital Screening Bilat w/ Oren     Findings:   This procedure was performed using tomosynthesis.   Computer-aided detection was utilized in the interpretation of this   examination.    The breasts are heterogeneously  dense, which may obscure small masses.   There is no evidence of suspicious masses, microcalcifications or   architectural distortion.           Assessment/Plan     Sarahy Ma is a 62 y.o.female with:    1. Essential hypertension    2. History of tongue cancer    3. Postablative hypothyroidism    4. Other ulcerative colitis without complication    5. Left leg pain  - gabapentin (NEURONTIN) 100 MG capsule; Take 1 capsule (100 mg total) by mouth 3 (three) times daily.  Dispense: 90 capsule; Refill: 11    6. Degenerative disc disease, lumbar    -try gabapentin 100 mg in AM  -Continue current medications and maintain follow up with specialists.    -Follow up in about 6 months (around 4/28/2023) for follow up of medical problems.       Barb Dixon MD  [facility]                  Answers for HPI/ROS submitted by the patient on 4/15/2022  activity change: No  unexpected weight change: No  rhinorrhea: No  trouble swallowing: No  visual disturbance: No  chest tightness: No  polyuria: Yes  difficulty urinating: No  menstrual problem: No  joint swelling: No  arthralgias: Yes  confusion: No  dysphoric mood: No

## 2022-10-28 NOTE — ASSESSMENT & PLAN NOTE
On mesalamine, works well for her.  Sees Dr. Forte.  Cscope 2/2021.  Has been having flares lately because stress has been high.

## 2022-11-02 ENCOUNTER — TELEPHONE (OUTPATIENT)
Dept: ORTHOPEDICS | Facility: CLINIC | Age: 62
End: 2022-11-02
Payer: COMMERCIAL

## 2022-11-02 ENCOUNTER — PATIENT MESSAGE (OUTPATIENT)
Dept: INTERNAL MEDICINE | Facility: CLINIC | Age: 62
End: 2022-11-02
Payer: COMMERCIAL

## 2022-11-02 ENCOUNTER — PATIENT MESSAGE (OUTPATIENT)
Dept: ORTHOPEDICS | Facility: CLINIC | Age: 62
End: 2022-11-02
Payer: COMMERCIAL

## 2022-11-07 ENCOUNTER — IMMUNIZATION (OUTPATIENT)
Dept: PHARMACY | Facility: CLINIC | Age: 62
End: 2022-11-07
Payer: COMMERCIAL

## 2022-11-18 ENCOUNTER — PROCEDURE VISIT (OUTPATIENT)
Dept: NEUROLOGY | Facility: CLINIC | Age: 62
End: 2022-11-18
Payer: COMMERCIAL

## 2022-11-18 DIAGNOSIS — M54.16 LUMBAR RADICULOPATHY: ICD-10-CM

## 2022-11-18 PROCEDURE — 95910 PR NERVE CONDUCTION STUDY; 7-8 STUDIES: ICD-10-PCS | Mod: S$GLB,,, | Performed by: PSYCHIATRY & NEUROLOGY

## 2022-11-18 PROCEDURE — 95886 PR EMG COMPLETE, W/ NERVE CONDUCTION STUDIES, 5+ MUSCLES: ICD-10-PCS | Mod: S$GLB,,, | Performed by: PSYCHIATRY & NEUROLOGY

## 2022-11-18 PROCEDURE — 95910 NRV CNDJ TEST 7-8 STUDIES: CPT | Mod: S$GLB,,, | Performed by: PSYCHIATRY & NEUROLOGY

## 2022-11-18 PROCEDURE — 95886 MUSC TEST DONE W/N TEST COMP: CPT | Mod: S$GLB,,, | Performed by: PSYCHIATRY & NEUROLOGY

## 2022-11-23 ENCOUNTER — PATIENT MESSAGE (OUTPATIENT)
Dept: ORTHOPEDICS | Facility: CLINIC | Age: 62
End: 2022-11-23
Payer: COMMERCIAL

## 2022-11-29 DIAGNOSIS — M54.16 LUMBAR RADICULOPATHY: Primary | ICD-10-CM

## 2022-11-29 NOTE — PROGRESS NOTES
Spoke to patient virtually. Pt had a right L2-3 L3-4 TFESI on 12/21/21 with 75% relief for 3 months. She continues to have significant low back and right leg pain. She has continued home exercises. I provided the patient with a home exercise program. It is the [facility] spine conditioning program. Exercises include head rolls, kneeling back extension, sitting rotation stretch, modified seated side straddle, knee to chest, bird dog, plank, modified seated plank, hip bridges, abdominal bracing, and abdominal crunch. Pt completes each exercise daily for an hour with worsening of pain. She also takes 600mg gabapentin nightly. MRI lumbar demonstrates moderate canal stenosis at L3-L4 due to diffuse disc bulge, facet joint osseous hypertrophy and ligamentum flavum hypertrophy. Given failure of conservative rx, will order IL L3-4 KALLIE with pain management. Pt will fu after injection. May be candidate for L3-4 decompressive laminectomy in future.

## 2022-11-30 ENCOUNTER — PATIENT MESSAGE (OUTPATIENT)
Dept: PAIN MEDICINE | Facility: OTHER | Age: 62
End: 2022-11-30
Payer: COMMERCIAL

## 2022-11-30 ENCOUNTER — TELEPHONE (OUTPATIENT)
Dept: PAIN MEDICINE | Facility: OTHER | Age: 62
End: 2022-11-30
Payer: COMMERCIAL

## 2022-11-30 DIAGNOSIS — M54.16 LUMBAR RADICULOPATHY: Primary | ICD-10-CM

## 2022-11-30 NOTE — TELEPHONE ENCOUNTER
Spoke to patient to schedule direct referral procedure. Patient is scheduled on 12/21/22 at 7:30 AM with Dr. Thornton. Patient was offered an opportunity to be scheduled in the [facility] for a consult with the performing provider before scheduling. Patient declined provider consult. Date, time, and instructions for procedure given to patient. Patient verbalized understanding.

## 2022-12-07 ENCOUNTER — PATIENT MESSAGE (OUTPATIENT)
Dept: GASTROENTEROLOGY | Facility: CLINIC | Age: 62
End: 2022-12-07
Payer: COMMERCIAL

## 2022-12-07 DIAGNOSIS — K51.80 OTHER ULCERATIVE COLITIS WITHOUT COMPLICATION: ICD-10-CM

## 2022-12-07 RX ORDER — MESALAMINE 1.2 G/1
1.2 TABLET, DELAYED RELEASE ORAL 2 TIMES DAILY
Qty: 60 TABLET | Refills: 6 | Status: SHIPPED | OUTPATIENT
Start: 2022-12-07 | End: 2023-04-28 | Stop reason: SDUPTHER

## 2022-12-15 ENCOUNTER — TELEPHONE (OUTPATIENT)
Dept: PAIN MEDICINE | Facility: CLINIC | Age: 62
End: 2022-12-15
Payer: COMMERCIAL

## 2022-12-15 ENCOUNTER — PATIENT MESSAGE (OUTPATIENT)
Dept: ORTHOPEDICS | Facility: CLINIC | Age: 62
End: 2022-12-15
Payer: COMMERCIAL

## 2022-12-15 NOTE — TELEPHONE ENCOUNTER
----- Message from William French sent at 12/15/2022 10:14 AM CST -----  Name of Who is Calling: DENISE VERNON [[id number]]            What is the request in detail: Patient is requesting call back to reschedule procedure 12/21 being she got another injection              Can the clinic reply by [facility]: no              [facility] [phone]

## 2023-01-05 ENCOUNTER — PATIENT MESSAGE (OUTPATIENT)
Dept: PRIMARY CARE CLINIC | Facility: CLINIC | Age: 63
End: 2023-01-05

## 2023-01-05 ENCOUNTER — E-VISIT (OUTPATIENT)
Dept: PRIMARY CARE CLINIC | Facility: CLINIC | Age: 63
End: 2023-01-05
Payer: COMMERCIAL

## 2023-01-05 DIAGNOSIS — N30.00 ACUTE CYSTITIS WITHOUT HEMATURIA: Primary | ICD-10-CM

## 2023-01-05 PROCEDURE — 99499 UNLISTED E&M SERVICE: CPT | Mod: ,,, | Performed by: INTERNAL MEDICINE

## 2023-01-05 PROCEDURE — 99499 NO LOS: ICD-10-PCS | Mod: ,,, | Performed by: INTERNAL MEDICINE

## 2023-01-05 RX ORDER — NITROFURANTOIN 25; 75 MG/1; MG/1
100 CAPSULE ORAL 2 TIMES DAILY
Qty: 10 CAPSULE | Refills: 0 | Status: SHIPPED | OUTPATIENT
Start: 2023-01-05 | End: 2023-05-26

## 2023-01-05 NOTE — PROGRESS NOTES
Patient ID: Sarahy Ma is a 62 y.o. female.    Chief Complaint: Urinary Tract Infection    The patient initiated a request through [facility] on 1/5/2023 for evaluation and management with a chief complaint of Urinary Tract Infection     I evaluated the questionnaire submission on 1/5/2023.    [facility] Peq Evisit Uti Questionnaire    1/5/2023 11:37 AM CST - Filed by Patient   Do you agree to participate in an E-Visit? Yes   If you have any of the following problems, please present to your local ER or call 911:  I acknowledge   What is the main issue that you would like for your doctor to address today? Possible uti   Are you able to take your vital signs? No   What symptoms do you currently have? Pain while passing urine;  Difficulty passing urine   When did your symptoms first appear? 1/5/2023   List what you have done or taken to help your symptoms. I took 2 AZO and drinking plenty water and going to the restroom as needed not waiting   Please indicate whether you have had the following symptoms during the past 24 hours     Urgent urination (a sudden and uncontrollable urge to urinate) Moderate   Frequent urination of small amounts of urine (going to the toilet very often) Moderate   Burning pain when urinating Severe   Incomplete bladder emptying (still feel like you need to urinate again after urination) Moderate   Pain not associated with urination in the lower abdomen below the belly button) Moderate   What does your urine look like? Cloudy   Blood seen in the urine (without menstrual cycle) None   Flank pain (pain in one or both sides of the lower back) Moderate   Abnormal Vaginal Discharge (abnormal amount, color and/or odor) None   Discharge from the urethra (urinary opening) without urination None   High body temperature/fever? None-<99.5   Please rate how much discomfort you have experience because of the symptoms in the past 24 hours: Moderate   Please indicate how the symptoms have interfered with your  every day activities/work in the past 24 hours: Moderate   Please indicate how these symptoms have interfered with your social activities (visiting people, meeting with friends, etc.) in the past 24 hours? Moderate   Are you a diabetic? No   If you are female, please indicate whether you have the following at the time of completion of this questionnaire: Signs of menopause syndrome (hot flashes)   Are you currently pregnant, could you be pregnant, or are you breast feeding? None of the above   Provide any information you feel is important to your history not asked above None   Please attach any relevant images or files (if you have performed a home test for UTI, please submit a photo of results)           Active Problem List with Overview Notes    Diagnosis Date Noted    Essential hypertension 03/09/2020    Nodule of upper lobe of right lung 12/31/2019     Chest CTA in December 2019 with 4 mm nodule in right upper lobe (posteriorly).  Other findings included area of modest tree-in-bud change, along with calcified granuloma(s) in the right base.      Degenerative disc disease, lumbar 12/09/2019    History of tongue cancer 11/24/2017     Diagnosed/[facility] at [facility] in 2009.  Reportedly Stage 3 at time of diagnosis => treated with XRT/chemotherapy.      Ulcerative colitis     Postablative hypothyroidism       Recent Labs Obtained:  No visits with results within 7 Day(s) from this visit.   Latest known visit with results is:   Lab Visit on 10/26/2022   Component Date Value Ref Range Status    WBC 10/26/2022 3.96  3.90 - 12.70 K/uL Final    RBC 10/26/2022 4.42  4.00 - 5.40 M/uL Final    Hemoglobin 10/26/2022 13.4  12.0 - 16.0 g/dL Final    Hematocrit 10/26/2022 39.9  37.0 - 48.5 % Final    MCV 10/26/2022 90  82 - 98 fL Final    MCH 10/26/2022 30.3  27.0 - 31.0 pg Final    MCHC 10/26/2022 33.6  32.0 - 36.0 g/dL Final    RDW 10/26/2022 12.0  11.5 - 14.5 % Final    Platelets 10/26/2022 205  150 - 450 K/uL Final     MPV 10/26/2022 9.4  9.2 - 12.9 fL Final    Immature Granulocytes 10/26/2022 0.0  0.0 - 0.5 % Final    Gran # (ANC) 10/26/2022 2.2  1.8 - 7.7 K/uL Final    Immature Grans (Abs) 10/26/2022 0.00  0.00 - 0.04 K/uL Final    Comment: Mild elevation in immature granulocytes is non specific and   can be seen in a variety of conditions including stress response,   acute inflammation, trauma and pregnancy. Correlation with other   laboratory and clinical findings is essential.      Lymph # 10/26/2022 1.1  1.0 - 4.8 K/uL Final    Mono # 10/26/2022 0.5  0.3 - 1.0 K/uL Final    Eos # 10/26/2022 0.1  0.0 - 0.5 K/uL Final    Baso # 10/26/2022 0.03  0.00 - 0.20 K/uL Final    nRBC 10/26/2022 0  0 /100 WBC Final    Gran % 10/26/2022 56.1  38.0 - 73.0 % Final    Lymph % 10/26/2022 28.0  18.0 - 48.0 % Final    Mono % 10/26/2022 12.1  4.0 - 15.0 % Final    Eosinophil % 10/26/2022 3.0  0.0 - 8.0 % Final    Basophil % 10/26/2022 0.8  0.0 - 1.9 % Final    Differential Method 10/26/2022 Automated   Final    Cholesterol 10/26/2022 217 (H)  120 - 199 mg/dL Final    Comment: The National Cholesterol Education Program (NCEP) has set the  following guidelines (reference ranges) for Cholesterol:  Optimal.....................<200 mg/dL  Borderline High.............200-239 mg/dL  High........................> or = 240 mg/dL      Triglycerides 10/26/2022 86  30 - 150 mg/dL Final    Comment: The National Cholesterol Education Program (NCEP) has set the  following guidelines (reference values) for triglycerides:  Normal......................<150 mg/dL  Borderline High.............150-199 mg/dL  High........................200-499 mg/dL      HDL 10/26/2022 77 (H)  40 - 75 mg/dL Final    Comment: The National Cholesterol Education Program (NCEP) has set the  following guidelines (reference values) for HDL Cholesterol:  Low...............<40 mg/dL  Optimal...........>60 mg/dL      LDL Cholesterol 10/26/2022 122.8  63.0 - 159.0 mg/dL Final    Comment: The  National Cholesterol Education Program (NCEP) has set the  following guidelines (reference values) for LDL Cholesterol:  Optimal.......................<130 mg/dL  Borderline High...............130-159 mg/dL  High..........................160-189 mg/dL  Very High.....................>190 mg/dL      HDL/Cholesterol Ratio 10/26/2022 35.5  20.0 - 50.0 % Final    Total Cholesterol/HDL Ratio 10/26/2022 2.8  2.0 - 5.0 Final    Non-HDL Cholesterol 10/26/2022 140  mg/dL Final    Comment: Risk category and Non-HDL cholesterol goals:  Coronary heart disease (CHD)or equivalent (10-year risk of CHD >20%):  Non-HDL cholesterol goal     <130 mg/dL  Two or more CHD risk factors and 10-year risk of CHD <= 20%:  Non-HDL cholesterol goal     <160 mg/dL  0 to 1 CHD risk factor:  Non-HDL cholesterol goal     <190 mg/dL      Sodium 10/26/2022 141  136 - 145 mmol/L Final    Potassium 10/26/2022 3.6  3.5 - 5.1 mmol/L Final    Chloride 10/26/2022 104  95 - 110 mmol/L Final    CO2 10/26/2022 28  23 - 29 mmol/L Final    Glucose 10/26/2022 98  70 - 110 mg/dL Final    BUN 10/26/2022 15  7 - 17 mg/dL Final    Creatinine 10/26/2022 0.86  0.50 - 1.40 mg/dL Final    Calcium 10/26/2022 8.9  8.7 - 10.5 mg/dL Final    Total Protein 10/26/2022 7.2  6.0 - 8.4 g/dL Final    Albumin 10/26/2022 4.3  3.5 - 5.2 g/dL Final    Total Bilirubin 10/26/2022 0.6  0.1 - 1.0 mg/dL Final    Comment: For infants and newborns, interpretation of results should be based  on gestational age, weight and in agreement with clinical  observations.    Premature Infant recommended reference ranges:  Up to 24 hours.............<8.0 mg/dL  Up to 48 hours............<12.0 mg/dL  3-5 days..................<15.0 mg/dL  6-29 days.................<15.0 mg/dL      Alkaline Phosphatase 10/26/2022 76  38 - 126 U/L Final    AST 10/26/2022 26  15 - 46 U/L Final    ALT 10/26/2022 20  10 - 44 U/L Final    Anion Gap 10/26/2022 9  8 - 16 mmol/L Final    eGFR 10/26/2022 >60.0  >60 mL/min/1.73  m^2 Final    TSH 10/26/2022 2.760  0.400 - 4.000 uIU/mL Final    Comment: Warning:  Heterophilic antibodies in serum or plasma of   certain individuals are known to cause interference with   immunoassays. These antibodies may be present in blood samples   from individuals regularly exposed to animal or who have been   treated with animal products.     Patients taking high doses of supplemental biotin may have  negatively biased results.       Free T4 10/26/2022 1.03  0.71 - 1.51 ng/dL Final    Specimen UA 10/26/2022 Urine, Clean Catch   Final    Color, UA 10/26/2022 Yellow  Yellow, Straw, Camryn Final    Appearance, UA 10/26/2022 Clear  Clear Final    pH, UA 10/26/2022 7.0  5.0 - 8.0 Final    Specific Gravity, UA 10/26/2022 1.020  1.005 - 1.030 Final    Protein, UA 10/26/2022 Negative  Negative Final    Comment: Recommend a 24 hour urine protein or a urine   protein/creatinine ratio if globulin induced proteinuria is  clinically suspected.      Glucose, UA 10/26/2022 Negative  Negative Final    Ketones, UA 10/26/2022 Negative  Negative Final    Bilirubin (UA) 10/26/2022 Negative  Negative Final    Occult Blood UA 10/26/2022 1+ (A)  Negative Final    Nitrite, UA 10/26/2022 Negative  Negative Final    Urobilinogen, UA 10/26/2022 Negative  <2.0 EU/dL Final    Leukocytes, UA 10/26/2022 Trace (A)  Negative Final    RBC, UA 10/26/2022 10 (H)  0 - 4 /hpf Final    WBC, UA 10/26/2022 4  0 - 5 /hpf Final    Bacteria 10/26/2022 Moderate (A)  None-Occ /hpf Final    Squam Epithel, UA 10/26/2022 1  /hpf Final    Microscopic Comment 10/26/2022 SEE COMMENT   Final    Comment: Other formed elements not mentioned in the report are not   present in the microscopic examination.          Encounter Diagnosis   Name Primary?    Acute cystitis without hematuria Yes        Orders Placed This Encounter   Procedures    Urinalysis, Reflex to Urine Culture Urine, Clean Catch     Standing Status:   Future     Standing Expiration Date:   3/5/2024      Order Specific Question:   Preferred Collection Type     Answer:   Urine, Clean Catch     Order Specific Question:   Specimen Source     Answer:   Urine      Medications Ordered This Encounter   Medications    nitrofurantoin, macrocrystal-monohydrate, (MACROBID) 100 MG capsule     Sig: Take 1 capsule (100 mg total) by mouth 2 (two) times daily.     Dispense:  10 capsule     Refill:  0        E-Visit Time Tracking:    Day 1 Time (in minutes): 5     Total Time (in minutes): 5

## 2023-01-10 ENCOUNTER — PATIENT MESSAGE (OUTPATIENT)
Dept: PRIMARY CARE CLINIC | Facility: CLINIC | Age: 63
End: 2023-01-10
Payer: COMMERCIAL

## 2023-01-10 DIAGNOSIS — R30.0 DYSURIA: Primary | ICD-10-CM

## 2023-01-12 ENCOUNTER — PATIENT MESSAGE (OUTPATIENT)
Dept: PRIMARY CARE CLINIC | Facility: CLINIC | Age: 63
End: 2023-01-12
Payer: COMMERCIAL

## 2023-01-12 ENCOUNTER — OFFICE VISIT (OUTPATIENT)
Dept: URGENT CARE | Facility: CLINIC | Age: 63
End: 2023-01-12
Payer: COMMERCIAL

## 2023-01-12 VITALS
OXYGEN SATURATION: 97 % | TEMPERATURE: 98 F | BODY MASS INDEX: 21.35 KG/M2 | HEART RATE: 84 BPM | HEIGHT: 62 IN | SYSTOLIC BLOOD PRESSURE: 123 MMHG | WEIGHT: 116 LBS | DIASTOLIC BLOOD PRESSURE: 84 MMHG | RESPIRATION RATE: 20 BRPM

## 2023-01-12 DIAGNOSIS — N30.01 ACUTE CYSTITIS WITH HEMATURIA: Primary | ICD-10-CM

## 2023-01-12 DIAGNOSIS — R30.0 DYSURIA: ICD-10-CM

## 2023-01-12 LAB
BILIRUB UR QL STRIP: NEGATIVE
GLUCOSE UR QL STRIP: NEGATIVE
KETONES UR QL STRIP: NEGATIVE
LEUKOCYTE ESTERASE UR QL STRIP: POSITIVE
PH, POC UA: 5 (ref 5–8)
POC BLOOD, URINE: POSITIVE
POC NITRATES, URINE: NEGATIVE
PROT UR QL STRIP: POSITIVE
SP GR UR STRIP: 1.02 (ref 1–1.03)
UROBILINOGEN UR STRIP-ACNC: NORMAL (ref 0.1–1.1)

## 2023-01-12 PROCEDURE — 3079F DIAST BP 80-89 MM HG: CPT | Mod: CPTII,S$GLB,, | Performed by: PHYSICIAN ASSISTANT

## 2023-01-12 PROCEDURE — 99214 PR OFFICE/OUTPT VISIT, EST, LEVL IV, 30-39 MIN: ICD-10-PCS | Mod: S$GLB,,, | Performed by: PHYSICIAN ASSISTANT

## 2023-01-12 PROCEDURE — 3008F PR BODY MASS INDEX (BMI) DOCUMENTED: ICD-10-PCS | Mod: CPTII,S$GLB,, | Performed by: PHYSICIAN ASSISTANT

## 2023-01-12 PROCEDURE — 87186 SC STD MICRODIL/AGAR DIL: CPT | Performed by: PHYSICIAN ASSISTANT

## 2023-01-12 PROCEDURE — 81003 POCT URINALYSIS, DIPSTICK, AUTOMATED, W/O SCOPE: ICD-10-PCS | Mod: QW,S$GLB,, | Performed by: PHYSICIAN ASSISTANT

## 2023-01-12 PROCEDURE — 81003 URINALYSIS AUTO W/O SCOPE: CPT | Mod: QW,S$GLB,, | Performed by: PHYSICIAN ASSISTANT

## 2023-01-12 PROCEDURE — 87088 URINE BACTERIA CULTURE: CPT | Performed by: PHYSICIAN ASSISTANT

## 2023-01-12 PROCEDURE — 87086 URINE CULTURE/COLONY COUNT: CPT | Performed by: PHYSICIAN ASSISTANT

## 2023-01-12 PROCEDURE — 3074F SYST BP LT 130 MM HG: CPT | Mod: CPTII,S$GLB,, | Performed by: PHYSICIAN ASSISTANT

## 2023-01-12 PROCEDURE — 87077 CULTURE AEROBIC IDENTIFY: CPT | Performed by: PHYSICIAN ASSISTANT

## 2023-01-12 PROCEDURE — 3008F BODY MASS INDEX DOCD: CPT | Mod: CPTII,S$GLB,, | Performed by: PHYSICIAN ASSISTANT

## 2023-01-12 PROCEDURE — 1159F PR MEDICATION LIST DOCUMENTED IN MEDICAL RECORD: ICD-10-PCS | Mod: CPTII,S$GLB,, | Performed by: PHYSICIAN ASSISTANT

## 2023-01-12 PROCEDURE — 1160F PR REVIEW ALL MEDS BY PRESCRIBER/CLIN PHARMACIST DOCUMENTED: ICD-10-PCS | Mod: CPTII,S$GLB,, | Performed by: PHYSICIAN ASSISTANT

## 2023-01-12 PROCEDURE — 1160F RVW MEDS BY RX/DR IN RCRD: CPT | Mod: CPTII,S$GLB,, | Performed by: PHYSICIAN ASSISTANT

## 2023-01-12 PROCEDURE — 1159F MED LIST DOCD IN RCRD: CPT | Mod: CPTII,S$GLB,, | Performed by: PHYSICIAN ASSISTANT

## 2023-01-12 PROCEDURE — 3079F PR MOST RECENT DIASTOLIC BLOOD PRESSURE 80-89 MM HG: ICD-10-PCS | Mod: CPTII,S$GLB,, | Performed by: PHYSICIAN ASSISTANT

## 2023-01-12 PROCEDURE — 99214 OFFICE O/P EST MOD 30 MIN: CPT | Mod: S$GLB,,, | Performed by: PHYSICIAN ASSISTANT

## 2023-01-12 PROCEDURE — 3074F PR MOST RECENT SYSTOLIC BLOOD PRESSURE < 130 MM HG: ICD-10-PCS | Mod: CPTII,S$GLB,, | Performed by: PHYSICIAN ASSISTANT

## 2023-01-12 RX ORDER — CEPHALEXIN 500 MG/1
500 CAPSULE ORAL EVERY 8 HOURS
Qty: 21 CAPSULE | Refills: 0 | Status: SHIPPED | OUTPATIENT
Start: 2023-01-12 | End: 2023-01-19

## 2023-01-12 RX ORDER — PHENAZOPYRIDINE HYDROCHLORIDE 200 MG/1
200 TABLET, FILM COATED ORAL 3 TIMES DAILY PRN
Qty: 6 TABLET | Refills: 0 | Status: SHIPPED | OUTPATIENT
Start: 2023-01-12 | End: 2023-01-22

## 2023-01-12 NOTE — PROGRESS NOTES
"Subjective:       Patient ID: Sarahy Ma is a 62 y.o. female.    Vitals:  height is 5' 2" (1.575 m) and weight is 52.6 kg (116 lb). Her oral temperature is 98 °F (36.7 °C). Her blood pressure is 123/84 and her pulse is 84. Her respiration is 20 and oxygen saturation is 97%.     Chief Complaint: Urinary Tract Infection    Pt reached out to her doctor virtually which he prescribed her an antibiotic for 5 days. Pt took her last dose Monday, and feels like her uti have gotten worse.     Patient provider note starts here:  Patient presents with complaints of dysuria, urinary frequency and urgency which initially started on 01/04.  Reports that she had a virtual appointment with Dr. Dixon who prescribed Macrobid and obtain urine culture.  Urine culture shows sensitivity to Macrobid however, patient reports that her symptoms seem to have worsened over the course of this time and have not gotten better.  Reports that the burning is her most bothersome complaint.  Also endorses nausea but denies vomiting.  States she has a history of back pain so it is difficult for her to tell whether she has flank pain or not.  Denies documented fever.    Urinary Tract Infection   This is a recurrent problem. The current episode started acute onset. The problem occurs every urination. The problem has been gradually worsening. The quality of the pain is described as burning. The pain is at a severity of 9/10. The pain is mild. There has been no fever. She is Not sexually active. There is No history of pyelonephritis. Associated symptoms include frequency, nausea, urgency and withholding. Pertinent negatives include no behavior changes, chills, discharge, flank pain, hematuria, hesitancy, possible pregnancy, sweats, vomiting, weight loss, bubble bath use or constipation. She has tried antibiotics for the symptoms. The treatment provided no relief. Her past medical history is significant for recurrent UTIs. There is no history of " hypertension or kidney stones.     Constitution: Negative for chills and fever.   Neck: Negative for neck pain.   Cardiovascular:  Negative for chest pain, palpitations and sob on exertion.   Respiratory:  Negative for chest tightness and wheezing.    Gastrointestinal:  Positive for nausea. Negative for abdominal pain, vomiting, constipation and diarrhea.   Genitourinary:  Positive for dysuria, frequency and urgency. Negative for flank pain, hematuria and pelvic pain (Pelvic pressure, denies significant pelvic pain).   Skin:  Negative for color change and wound.   Neurological:  Negative for numbness and tingling.     Objective:      Physical Exam   Constitutional: She is oriented to person, place, and time. She appears well-developed.   HENT:   Head: Normocephalic and atraumatic.   Ears:   Right Ear: External ear normal.   Left Ear: External ear normal.   Nose: Nose normal. No nasal deformity. No epistaxis.   Mouth/Throat: Oropharynx is clear and moist and mucous membranes are normal.   Eyes: Lids are normal.   Neck: Trachea normal and phonation normal. Neck supple.   Cardiovascular: Normal pulses.   Pulmonary/Chest: Effort normal.   Abdominal: Normal appearance. Soft. There is no abdominal tenderness. There is no left CVA tenderness and no right CVA tenderness.   Neurological: She is alert and oriented to person, place, and time.   Skin: Skin is warm, dry and intact.   Psychiatric: Her speech is normal and behavior is normal.   Nursing note and vitals reviewed.      Assessment:       1. Acute cystitis with hematuria    2. Dysuria        Results for orders placed or performed in visit on 01/12/23   POCT Urinalysis, Dipstick, Automated, W/O Scope   Result Value Ref Range    POC Blood, Urine Positive (A) Negative    POC Bilirubin, Urine Negative Negative    POC Urobilinogen, Urine Normal 0.1 - 1.1    POC Ketones, Urine Negative Negative    POC Protein, Urine Positive (A) Negative    POC Nitrates, Urine Negative  Negative    POC Glucose, Urine Negative Negative    pH, UA 5.0 5 - 8    POC Specific Gravity, Urine 1.025 1.003 - 1.029    POC Leukocytes, Urine Positive (A) Negative       Plan:         Acute cystitis with hematuria  -     CULTURE, URINE  -     cephALEXin (KEFLEX) 500 MG capsule; Take 1 capsule (500 mg total) by mouth every 8 (eight) hours. for 7 days  Dispense: 21 capsule; Refill: 0    Dysuria  -     POCT Urinalysis, Dipstick, Automated, W/O Scope  -     phenazopyridine (PYRIDIUM) 200 MG tablet; Take 1 tablet (200 mg total) by mouth 3 (three) times daily as needed for Pain.  Dispense: 6 tablet; Refill: 0           Medical Decision Making:   History:   Old Medical Records: I decided to obtain old medical records.  Differential Diagnosis:   Differential Diagnosis includes, but is not limited to:  Vaginal infection such as yeast infection, vaginitis, topical irritant, bacterial vaginosis, STD such as gonorrhea, chlamydia, UTI, discomfort of pregnancy, ectopic pregnancy, ovarian cysts, ovarian torsion, constipation, colitis, diverticulitis      Clinical Tests:   Lab Tests: Ordered and Reviewed       <> Summary of Lab: UA with blood, protein and leukocytes  Urgent Care Management:  Patient presents with concern for UTI refractory to treatment with Macrobid. On exam, she is afebrile and nontoxic appearing. There is no CVA tenderness. Recent urine culture shows sensitivity to Macrobid, however UA today still with evidence of infection and patient still symptomatic. Changing abx to Keflex and obtaining another urine culture. ED precautions discussed, she verbalized understanding and agreed with plan. Will follow-up with PCP.      Patient Instructions   If not allergic,take tylenol (acetominophen) for fever control, chills, or body aches every 4 hours. Do not exceed 4000 mg/ day.If not allergic, take Motrin (Ibuprofen) every 4 hours for fever, chills, pain or inflammation. Do not exceed 2400 mg/day. You can alternate  taking tylenol and motrin.     You must understand that you've received an Urgent Care treatment only and that you may be released before all your medical problems are known or treated. You, the patient, will arrange for follow up care as instructed.      Follow up with your PCP or specialty clinic as instructed in the next 2-3 days if not improved or as needed. You can call [phone] to schedule an appointment with appropriate provider.      If you condition worsens, we recommend that you receive another evaluation at the emergency room immediately or contact your primary medical clinic's after hours call service to discuss your concerns.      Please return here or go to the Emergency Department for any concerns or worsening condition.      If you were prescribed a narcotic or controlled substance, do not drive or operate heavy equipment or machinery while taking these medications.

## 2023-01-13 NOTE — PATIENT INSTRUCTIONS
If not allergic,take tylenol (acetominophen) for fever control, chills, or body aches every 4 hours. Do not exceed 4000 mg/ day.If not allergic, take Motrin (Ibuprofen) every 4 hours for fever, chills, pain or inflammation. Do not exceed 2400 mg/day. You can alternate taking tylenol and motrin.     You must understand that you've received an Urgent Care treatment only and that you may be released before all your medical problems are known or treated. You, the patient, will arrange for follow up care as instructed.      Follow up with your PCP or specialty clinic as instructed in the next 2-3 days if not improved or as needed. You can call [phone] to schedule an appointment with appropriate provider.      If you condition worsens, we recommend that you receive another evaluation at the emergency room immediately or contact your primary medical clinic's after hours call service to discuss your concerns.      Please return here or go to the Emergency Department for any concerns or worsening condition.      If you were prescribed a narcotic or controlled substance, do not drive or operate heavy equipment or machinery while taking these medications.

## 2023-01-20 LAB — BACTERIA UR CULT: ABNORMAL

## 2023-01-25 ENCOUNTER — TELEPHONE (OUTPATIENT)
Dept: URGENT CARE | Facility: CLINIC | Age: 63
End: 2023-01-25
Payer: COMMERCIAL

## 2023-01-25 NOTE — TELEPHONE ENCOUNTER
Left message to discuss urine culture results. She has viewed the results through the patient portal and she was treated appropriately at the time of the clinic visit.

## 2023-02-17 ENCOUNTER — PATIENT MESSAGE (OUTPATIENT)
Dept: PAIN MEDICINE | Facility: OTHER | Age: 63
End: 2023-02-17
Payer: COMMERCIAL

## 2023-02-17 ENCOUNTER — TELEPHONE (OUTPATIENT)
Dept: PAIN MEDICINE | Facility: OTHER | Age: 63
End: 2023-02-17
Payer: COMMERCIAL

## 2023-02-17 NOTE — TELEPHONE ENCOUNTER
----- Message from Jeannette Campos MA sent at 2/17/2023  8:58 AM CST -----    ----- Message -----  From: William French  Sent: 2/17/2023   8:30 AM CST  To: [facility] Staff    Name of Who is Calling: DENISE VERNON [[id number]]            What is the request in detail: Patient is requesting call back to cancel 2/23 procedure               Can the clinic reply by [facility]: yes              What Number to Call Back if not in [facility]: [phone]

## 2023-04-13 DIAGNOSIS — Z12.31 OTHER SCREENING MAMMOGRAM: ICD-10-CM

## 2023-04-17 ENCOUNTER — PATIENT MESSAGE (OUTPATIENT)
Dept: ADMINISTRATIVE | Facility: HOSPITAL | Age: 63
End: 2023-04-17
Payer: COMMERCIAL

## 2023-04-18 ENCOUNTER — PATIENT MESSAGE (OUTPATIENT)
Dept: PRIMARY CARE CLINIC | Facility: CLINIC | Age: 63
End: 2023-04-18
Payer: COMMERCIAL

## 2023-04-18 ENCOUNTER — PATIENT MESSAGE (OUTPATIENT)
Dept: ORTHOPEDICS | Facility: CLINIC | Age: 63
End: 2023-04-18
Payer: COMMERCIAL

## 2023-04-18 DIAGNOSIS — M79.641 PAIN IN BOTH HANDS: Primary | ICD-10-CM

## 2023-04-18 DIAGNOSIS — M54.16 LUMBAR RADICULOPATHY: Primary | ICD-10-CM

## 2023-04-18 DIAGNOSIS — M79.642 PAIN IN BOTH HANDS: Primary | ICD-10-CM

## 2023-04-27 ENCOUNTER — PATIENT MESSAGE (OUTPATIENT)
Dept: GASTROENTEROLOGY | Facility: CLINIC | Age: 63
End: 2023-04-27
Payer: COMMERCIAL

## 2023-04-28 DIAGNOSIS — K51.80 OTHER ULCERATIVE COLITIS WITHOUT COMPLICATION: ICD-10-CM

## 2023-04-28 RX ORDER — MESALAMINE 1.2 G/1
1.2 TABLET, DELAYED RELEASE ORAL 2 TIMES DAILY
Qty: 60 TABLET | Refills: 11 | Status: SHIPPED | OUTPATIENT
Start: 2023-04-28 | End: 2023-05-23

## 2023-04-30 ENCOUNTER — PATIENT MESSAGE (OUTPATIENT)
Dept: PRIMARY CARE CLINIC | Facility: CLINIC | Age: 63
End: 2023-04-30
Payer: COMMERCIAL

## 2023-04-30 DIAGNOSIS — M25.50 ARTHRALGIA, UNSPECIFIED JOINT: Primary | ICD-10-CM

## 2023-04-30 DIAGNOSIS — E89.0 POSTABLATIVE HYPOTHYROIDISM: ICD-10-CM

## 2023-05-01 ENCOUNTER — PATIENT MESSAGE (OUTPATIENT)
Dept: PRIMARY CARE CLINIC | Facility: CLINIC | Age: 63
End: 2023-05-01
Payer: COMMERCIAL

## 2023-05-01 DIAGNOSIS — R31.9 HEMATURIA, UNSPECIFIED TYPE: Primary | ICD-10-CM

## 2023-05-02 ENCOUNTER — TELEPHONE (OUTPATIENT)
Dept: ORTHOPEDICS | Facility: CLINIC | Age: 63
End: 2023-05-02
Payer: COMMERCIAL

## 2023-05-02 DIAGNOSIS — M79.641 PAIN IN BOTH HANDS: Primary | ICD-10-CM

## 2023-05-02 DIAGNOSIS — M79.642 PAIN IN BOTH HANDS: Primary | ICD-10-CM

## 2023-05-02 PROBLEM — R68.89 IMPAIRED TOLERANCE OF ACTIVITY: Status: ACTIVE | Noted: 2023-05-02

## 2023-05-02 PROBLEM — R53.1 DECREASED RANGE OF MOTION WITH DECREASED STRENGTH: Status: ACTIVE | Noted: 2023-05-02

## 2023-05-02 PROBLEM — M25.60 DECREASED RANGE OF MOTION WITH DECREASED STRENGTH: Status: ACTIVE | Noted: 2023-05-02

## 2023-05-03 ENCOUNTER — OFFICE VISIT (OUTPATIENT)
Dept: ORTHOPEDICS | Facility: CLINIC | Age: 63
End: 2023-05-03
Payer: COMMERCIAL

## 2023-05-03 VITALS — WEIGHT: 116 LBS | BODY MASS INDEX: 21.35 KG/M2 | HEIGHT: 62 IN

## 2023-05-03 DIAGNOSIS — M65.331 TRIGGER MIDDLE FINGER OF RIGHT HAND: ICD-10-CM

## 2023-05-03 DIAGNOSIS — M79.642 PAIN IN BOTH HANDS: ICD-10-CM

## 2023-05-03 DIAGNOSIS — M79.641 PAIN IN BOTH HANDS: ICD-10-CM

## 2023-05-03 PROCEDURE — 3008F BODY MASS INDEX DOCD: CPT | Mod: CPTII,S$GLB,, | Performed by: ORTHOPAEDIC SURGERY

## 2023-05-03 PROCEDURE — 99999 PR PBB SHADOW E&M-EST. PATIENT-LVL III: ICD-10-PCS | Mod: PBBFAC,,, | Performed by: ORTHOPAEDIC SURGERY

## 2023-05-03 PROCEDURE — 99203 OFFICE O/P NEW LOW 30 MIN: CPT | Mod: 25,S$GLB,, | Performed by: ORTHOPAEDIC SURGERY

## 2023-05-03 PROCEDURE — 99999 PR PBB SHADOW E&M-EST. PATIENT-LVL III: CPT | Mod: PBBFAC,,, | Performed by: ORTHOPAEDIC SURGERY

## 2023-05-03 PROCEDURE — 20550 NJX 1 TENDON SHEATH/LIGAMENT: CPT | Mod: 50,S$GLB,, | Performed by: ORTHOPAEDIC SURGERY

## 2023-05-03 PROCEDURE — 1159F MED LIST DOCD IN RCRD: CPT | Mod: CPTII,S$GLB,, | Performed by: ORTHOPAEDIC SURGERY

## 2023-05-03 PROCEDURE — 3008F PR BODY MASS INDEX (BMI) DOCUMENTED: ICD-10-PCS | Mod: CPTII,S$GLB,, | Performed by: ORTHOPAEDIC SURGERY

## 2023-05-03 PROCEDURE — 20550 PR INJECT TENDON SHEATH/LIGAMENT: ICD-10-PCS | Mod: 50,S$GLB,, | Performed by: ORTHOPAEDIC SURGERY

## 2023-05-03 PROCEDURE — 99203 PR OFFICE/OUTPT VISIT, NEW, LEVL III, 30-44 MIN: ICD-10-PCS | Mod: 25,S$GLB,, | Performed by: ORTHOPAEDIC SURGERY

## 2023-05-03 PROCEDURE — 1159F PR MEDICATION LIST DOCUMENTED IN MEDICAL RECORD: ICD-10-PCS | Mod: CPTII,S$GLB,, | Performed by: ORTHOPAEDIC SURGERY

## 2023-05-03 RX ORDER — TRIAMCINOLONE ACETONIDE 40 MG/ML
40 INJECTION, SUSPENSION INTRA-ARTICULAR; INTRAMUSCULAR
Status: COMPLETED | OUTPATIENT
Start: 2023-05-03 | End: 2023-05-03

## 2023-05-03 RX ADMIN — TRIAMCINOLONE ACETONIDE 40 MG: 40 INJECTION, SUSPENSION INTRA-ARTICULAR; INTRAMUSCULAR at 02:05

## 2023-05-03 NOTE — PROGRESS NOTES
Subjective:      Patient ID: Sarahy Ma is a 62 y.o. female.    Chief Complaint: Consult and Hand Pain      HPI  Sarahy Ma is a  62 y.o. female presenting today for painful locking of the fingers both hands   .  There was not a history of trauma.  Onset of symptoms began several months ago  Mainly involving the middle finger right hand worse than left   No numbness or tingling reported.      Review of patient's allergies indicates:   Allergen Reactions    Amoxicillin      Other reaction(s): fever blisters    Nsaids (non-steroidal anti-inflammatory drug)      Other reaction(s): nausea, stomach issues         Current Outpatient Medications   Medication Sig Dispense Refill    amLODIPine (NORVASC) 10 MG tablet Take 1 tablet (10 mg total) by mouth once daily. 90 tablet 3    BREO ELLIPTA 100-25 mcg/dose diskus inhaler INHALE 1 PUFF INTO THE LUNGS ONCE DAILY 180 each 2    gabapentin (NEURONTIN) 300 MG capsule Take 1 capsule (300 mg total) by mouth 3 (three) times daily. (Patient taking differently: Take 600 mg by mouth every evening.) 90 capsule 11    meclizine (ANTIVERT) 25 mg tablet Take 1 tablet (25 mg total) by mouth 3 (three) times daily as needed for Dizziness. 20 tablet 0    mesalamine (LIALDA) 1.2 gram TbEC Take 1 tablet (1.2 g total) by mouth 2 (two) times daily. 60 tablet 11    ondansetron (ZOFRAN-ODT) 4 MG TbDL Take 1 tablet (4 mg total) by mouth every 8 (eight) hours as needed (nausea). 10 tablet 0    pantoprazole (PROTONIX) 40 MG tablet Take 1 tablet (40 mg total) by mouth once daily. 30 tablet 11    SYNTHROID 75 mcg tablet Take 1 tablet (75 mcg total) by mouth before breakfast. 90 tablet 3    gabapentin (NEURONTIN) 100 MG capsule Take 1 capsule (100 mg total) by mouth 3 (three) times daily. 90 capsule 11    nitrofurantoin, macrocrystal-monohydrate, (MACROBID) 100 MG capsule Take 1 capsule (100 mg total) by mouth 2 (two) times daily. 10 capsule 0     No current facility-administered  "medications for this visit.       Past Medical History:   Diagnosis Date    Hypertension     Postablative hypothyroidism     secondary to the radiation treatment for tongue cancer - treated by oncologist    Tongue cancer     Ulcerative colitis     Treated by Dr. Tobias    Ulcerative colitis        Past Surgical History:   Procedure Laterality Date     SECTION      CHOLECYSTECTOMY  2016    removed    CHOLECYSTECTOMY  2016    COLONOSCOPY      Dr. Tobias    COLONOSCOPY N/A 2021    Procedure: COLONOSCOPY Suprep;  Surgeon: Chetan Forte MD;  Location: [facility] ENDO;  Service: Endoscopy;  Laterality: N/A;    feeding tube      groin surgery  2017    swollen lump node    HYSTERECTOMY      OOPHORECTOMY      PORTACATH PLACEMENT      TONSILLECTOMY      TRANSFORAMINAL EPIDURAL INJECTION OF STEROID Right 2021    Procedure: LUMBAR TRANSFORAMINAL RIGHT L2/3 AND L3/4 DIRECT REFERRAL ;  Surgeon: Tana Beckwith MD;  Location: [facility];  Service: Pain Management;  Laterality: Right;    TUBAL LIGATION         Review of Systems:  ROS    OBJECTIVE:     PHYSICAL EXAM:  Height: 5' 2" (157.5 cm) Weight: 52.6 kg (116 lb)  Vitals:    23 1339   Weight: 52.6 kg (116 lb)   Height: 5' 2" (1.575 m)   PainSc:   8     Well developed, well nourished female in no acute distress  Alert and oriented x 3  HEENT- Normal exam  Lungs- Clear to auscultation  Heart- Regular rate and rhythm  Abdomen- Soft nontender  Extremity exam- examination hands demonstrate tenderness over the flexor tendon sheath middle finger bilateral mild swelling range of motion fingers slightly decreased with mild triggering of the middle fingers bilaterally   Sensation intact  Tinel sign negative       RADIOGRAPHS:  AP lateral x-rays bilateral hands show some mild degenerative changes  Comments: I have personally reviewed the imaging and I agree with the above radiologist's report.    ASSESSMENT/PLAN:     IMPRESSION:  Triggering of " the middle fingers bilaterally    PLAN:  I explained the nature of the problem to the patient   Recommended injections for both hands  After pause for time-out identified the middle finger of each hand injected flexor tendon sheath with combination Kenalog 20 milligrams 0.5 cc xylocaine sterile technique  She tolerated the procedure well without complication   Follow-up 4-6 weeks       - We talked at length about the anatomy and pathophysiology of   Encounter Diagnoses   Name Primary?    Pain in both hands     Trigger middle finger of right hand            Disclaimer: This note has been generated using voice-recognition software. There may be typographical errors that have been missed during proof-reading.

## 2023-05-04 ENCOUNTER — PATIENT MESSAGE (OUTPATIENT)
Dept: PRIMARY CARE CLINIC | Facility: CLINIC | Age: 63
End: 2023-05-04
Payer: COMMERCIAL

## 2023-05-06 ENCOUNTER — PATIENT MESSAGE (OUTPATIENT)
Dept: GASTROENTEROLOGY | Facility: CLINIC | Age: 63
End: 2023-05-06
Payer: COMMERCIAL

## 2023-05-06 ENCOUNTER — PATIENT MESSAGE (OUTPATIENT)
Dept: ADMINISTRATIVE | Facility: OTHER | Age: 63
End: 2023-05-06
Payer: COMMERCIAL

## 2023-05-10 ENCOUNTER — OFFICE VISIT (OUTPATIENT)
Dept: UROLOGY | Facility: CLINIC | Age: 63
End: 2023-05-10
Payer: COMMERCIAL

## 2023-05-10 ENCOUNTER — OFFICE VISIT (OUTPATIENT)
Dept: PAIN MEDICINE | Facility: CLINIC | Age: 63
End: 2023-05-10
Attending: ANESTHESIOLOGY
Payer: COMMERCIAL

## 2023-05-10 ENCOUNTER — PATIENT MESSAGE (OUTPATIENT)
Dept: PAIN MEDICINE | Facility: OTHER | Age: 63
End: 2023-05-10
Payer: COMMERCIAL

## 2023-05-10 VITALS
HEART RATE: 77 BPM | HEIGHT: 62 IN | SYSTOLIC BLOOD PRESSURE: 106 MMHG | BODY MASS INDEX: 21.34 KG/M2 | DIASTOLIC BLOOD PRESSURE: 81 MMHG | WEIGHT: 115.94 LBS

## 2023-05-10 VITALS
HEIGHT: 62 IN | HEART RATE: 74 BPM | DIASTOLIC BLOOD PRESSURE: 75 MMHG | BODY MASS INDEX: 21.35 KG/M2 | WEIGHT: 116 LBS | SYSTOLIC BLOOD PRESSURE: 109 MMHG

## 2023-05-10 DIAGNOSIS — M25.551 RIGHT HIP PAIN: ICD-10-CM

## 2023-05-10 DIAGNOSIS — R31.29 MICROSCOPIC HEMATURIA: Primary | ICD-10-CM

## 2023-05-10 DIAGNOSIS — M47.26 OSTEOARTHRITIS OF SPINE WITH RADICULOPATHY, LUMBAR REGION: Primary | ICD-10-CM

## 2023-05-10 DIAGNOSIS — R35.0 URINARY FREQUENCY: ICD-10-CM

## 2023-05-10 DIAGNOSIS — R35.1 NOCTURIA: ICD-10-CM

## 2023-05-10 DIAGNOSIS — Z87.440 HISTORY OF RECURRENT UTIS: ICD-10-CM

## 2023-05-10 LAB
BILIRUB UR QL STRIP: NEGATIVE
CLARITY UR REFRACT.AUTO: CLEAR
COLOR UR AUTO: YELLOW
GLUCOSE UR QL STRIP: NEGATIVE
HGB UR QL STRIP: ABNORMAL
KETONES UR QL STRIP: NEGATIVE
LEUKOCYTE ESTERASE UR QL STRIP: ABNORMAL
MICROSCOPIC COMMENT: ABNORMAL
NITRITE UR QL STRIP: NEGATIVE
NON-SQ EPI CELLS #/AREA URNS AUTO: 1 /HPF
PH UR STRIP: 6 [PH] (ref 5–8)
PROT UR QL STRIP: NEGATIVE
RBC #/AREA URNS AUTO: 5 /HPF (ref 0–4)
SP GR UR STRIP: 1.01 (ref 1–1.03)
SQUAMOUS #/AREA URNS AUTO: 3 /HPF
URN SPEC COLLECT METH UR: ABNORMAL
WBC #/AREA URNS AUTO: 9 /HPF (ref 0–5)

## 2023-05-10 PROCEDURE — 1159F MED LIST DOCD IN RCRD: CPT | Mod: CPTII,S$GLB,, | Performed by: NURSE PRACTITIONER

## 2023-05-10 PROCEDURE — 3079F DIAST BP 80-89 MM HG: CPT | Mod: CPTII,S$GLB,, | Performed by: ANESTHESIOLOGY

## 2023-05-10 PROCEDURE — 87086 URINE CULTURE/COLONY COUNT: CPT | Performed by: NURSE PRACTITIONER

## 2023-05-10 PROCEDURE — 99205 PR OFFICE/OUTPT VISIT, NEW, LEVL V, 60-74 MIN: ICD-10-PCS | Mod: S$GLB,,, | Performed by: ANESTHESIOLOGY

## 2023-05-10 PROCEDURE — 1160F RVW MEDS BY RX/DR IN RCRD: CPT | Mod: CPTII,S$GLB,, | Performed by: ANESTHESIOLOGY

## 2023-05-10 PROCEDURE — 3079F PR MOST RECENT DIASTOLIC BLOOD PRESSURE 80-89 MM HG: ICD-10-PCS | Mod: CPTII,S$GLB,, | Performed by: ANESTHESIOLOGY

## 2023-05-10 PROCEDURE — 3008F BODY MASS INDEX DOCD: CPT | Mod: CPTII,S$GLB,, | Performed by: NURSE PRACTITIONER

## 2023-05-10 PROCEDURE — 99205 OFFICE O/P NEW HI 60 MIN: CPT | Mod: S$GLB,,, | Performed by: ANESTHESIOLOGY

## 2023-05-10 PROCEDURE — 3008F PR BODY MASS INDEX (BMI) DOCUMENTED: ICD-10-PCS | Mod: CPTII,S$GLB,, | Performed by: NURSE PRACTITIONER

## 2023-05-10 PROCEDURE — 3008F PR BODY MASS INDEX (BMI) DOCUMENTED: ICD-10-PCS | Mod: CPTII,S$GLB,, | Performed by: ANESTHESIOLOGY

## 2023-05-10 PROCEDURE — 1160F PR REVIEW ALL MEDS BY PRESCRIBER/CLIN PHARMACIST DOCUMENTED: ICD-10-PCS | Mod: CPTII,S$GLB,, | Performed by: ANESTHESIOLOGY

## 2023-05-10 PROCEDURE — 1159F PR MEDICATION LIST DOCUMENTED IN MEDICAL RECORD: ICD-10-PCS | Mod: CPTII,S$GLB,, | Performed by: NURSE PRACTITIONER

## 2023-05-10 PROCEDURE — 99214 PR OFFICE/OUTPT VISIT, EST, LEVL IV, 30-39 MIN: ICD-10-PCS | Mod: S$GLB,,, | Performed by: NURSE PRACTITIONER

## 2023-05-10 PROCEDURE — 99999 PR PBB SHADOW E&M-EST. PATIENT-LVL IV: CPT | Mod: PBBFAC,,, | Performed by: NURSE PRACTITIONER

## 2023-05-10 PROCEDURE — 99999 PR PBB SHADOW E&M-EST. PATIENT-LVL III: CPT | Mod: PBBFAC,,, | Performed by: ANESTHESIOLOGY

## 2023-05-10 PROCEDURE — 1159F PR MEDICATION LIST DOCUMENTED IN MEDICAL RECORD: ICD-10-PCS | Mod: CPTII,S$GLB,, | Performed by: ANESTHESIOLOGY

## 2023-05-10 PROCEDURE — 3008F BODY MASS INDEX DOCD: CPT | Mod: CPTII,S$GLB,, | Performed by: ANESTHESIOLOGY

## 2023-05-10 PROCEDURE — 3078F DIAST BP <80 MM HG: CPT | Mod: CPTII,S$GLB,, | Performed by: NURSE PRACTITIONER

## 2023-05-10 PROCEDURE — 3074F SYST BP LT 130 MM HG: CPT | Mod: CPTII,S$GLB,, | Performed by: NURSE PRACTITIONER

## 2023-05-10 PROCEDURE — 99999 PR PBB SHADOW E&M-EST. PATIENT-LVL III: ICD-10-PCS | Mod: PBBFAC,,, | Performed by: ANESTHESIOLOGY

## 2023-05-10 PROCEDURE — 3074F PR MOST RECENT SYSTOLIC BLOOD PRESSURE < 130 MM HG: ICD-10-PCS | Mod: CPTII,S$GLB,, | Performed by: NURSE PRACTITIONER

## 2023-05-10 PROCEDURE — 3074F SYST BP LT 130 MM HG: CPT | Mod: CPTII,S$GLB,, | Performed by: ANESTHESIOLOGY

## 2023-05-10 PROCEDURE — 3074F PR MOST RECENT SYSTOLIC BLOOD PRESSURE < 130 MM HG: ICD-10-PCS | Mod: CPTII,S$GLB,, | Performed by: ANESTHESIOLOGY

## 2023-05-10 PROCEDURE — 99214 OFFICE O/P EST MOD 30 MIN: CPT | Mod: S$GLB,,, | Performed by: NURSE PRACTITIONER

## 2023-05-10 PROCEDURE — 99999 PR PBB SHADOW E&M-EST. PATIENT-LVL IV: ICD-10-PCS | Mod: PBBFAC,,, | Performed by: NURSE PRACTITIONER

## 2023-05-10 PROCEDURE — 3078F PR MOST RECENT DIASTOLIC BLOOD PRESSURE < 80 MM HG: ICD-10-PCS | Mod: CPTII,S$GLB,, | Performed by: NURSE PRACTITIONER

## 2023-05-10 PROCEDURE — 1159F MED LIST DOCD IN RCRD: CPT | Mod: CPTII,S$GLB,, | Performed by: ANESTHESIOLOGY

## 2023-05-10 PROCEDURE — 81001 URINALYSIS AUTO W/SCOPE: CPT | Performed by: NURSE PRACTITIONER

## 2023-05-10 NOTE — PROGRESS NOTES
"Subjective:       Patient ID: Sraahy Ma is a 62 y.o. female.    Chief Complaint: Hematuria    Patient is new to me. She is a 63 yo WF who is here today for evaluation of persistent microscopic hematuria. Patient denies hx of nephrolithiasis, the use of blood thinners, or any known injury. She reports smoking cigarettes in high school only. Patient also reports a hx of recurrent UTIs and thinks she may get four a year    Hematuria  This is a chronic problem. The current episode started more than 1 year ago. The problem is unchanged. She describes the hematuria as microscopic hematuria. She reports no clotting in her urine stream. Her pain is at a severity of 0/10. She is experiencing no pain. She describes her urine color as yellow. Irritative symptoms include frequency and nocturia (x1-3). Irritative symptoms do not include urgency. Associated symptoms include hesitancy (sometimes). Pertinent negatives include no abdominal pain, chills, dysuria, facial swelling, fever, flank pain, genital pain, inability to urinate, nausea or vomiting. She is not sexually active. Her past medical history is significant for hypertension, tobacco use ("in high school only") and UTI. There is no history of  trauma or kidney stones.   Review of Systems   Constitutional:  Negative for chills, fatigue and fever.   HENT:  Negative for facial swelling.    Gastrointestinal:  Negative for abdominal pain, nausea and vomiting.   Genitourinary:  Positive for frequency, hematuria, hesitancy (sometimes) and nocturia (x1-3). Negative for decreased urine volume, difficulty urinating, dysuria, flank pain, pelvic pain, urgency, vaginal bleeding, vaginal discharge and vaginal pain.   Neurological:  Negative for dizziness, weakness and headaches.   Psychiatric/Behavioral: Negative.         Objective:      Physical Exam  Vitals and nursing note reviewed.   Constitutional:       General: She is not in acute distress.     Appearance: She is " well-developed and normal weight. She is not ill-appearing.   HENT:      Head: Normocephalic and atraumatic.   Eyes:      Pupils: Pupils are equal, round, and reactive to light.   Cardiovascular:      Rate and Rhythm: Normal rate.   Pulmonary:      Effort: Pulmonary effort is normal. No respiratory distress.   Abdominal:      Palpations: Abdomen is soft.      Tenderness: There is no abdominal tenderness.   Musculoskeletal:         General: Normal range of motion.      Cervical back: Normal range of motion.   Skin:     General: Skin is warm and dry.   Neurological:      Mental Status: She is alert and oriented to person, place, and time.      Coordination: Coordination normal.   Psychiatric:         Mood and Affect: Mood normal.         Behavior: Behavior normal.         Thought Content: Thought content normal.         Judgment: Judgment normal.       Assessment:       Problem List Items Addressed This Visit    None  Visit Diagnoses       Microscopic hematuria    -  Primary    Relevant Orders    Urinalysis    Urine culture    CT Urogram Abd Pelvis W WO    Creatinine, Serum    Urinary frequency        Relevant Orders    Urinalysis    Urine culture    Nocturia        Relevant Orders    Urinalysis    Urine culture    History of recurrent UTIs        Relevant Orders    Urinalysis    Urine culture    Creatinine, Serum            Plan:           Sarahy was seen today for hematuria.    Diagnoses and all orders for this visit:    Microscopic hematuria  -     Urinalysis  -     Urine culture  -     CT Urogram Abd Pelvis W WO; Future  -     POCT CREATININE    Urinary frequency  -     Urinalysis  -     Urine culture    Nocturia  -     Urinalysis  -     Urine culture    History of recurrent UTIs  -     Urinalysis  -     Urine culture        -     Discussed estrace and premarin cream use & bladder antiseptic with patient. May revisit one of those treatment options later.      Follow-up pending urine cx and CT results.     Familia  BLAZE Royal, NP

## 2023-05-10 NOTE — PROGRESS NOTES
Subjective:      Patient ID: Sarahy Ma is a 62 y.o. female.    Chief Complaint: No chief complaint on file.    Referred by: Rachel Wakefield,*     Sarahy Ma presents for chronic lower back pain and right leg pain.  She states that the pain does often wrap around the leg (top of her thigh toward her groin, and sometimes down the side of her leg (lateral) to the foot.  She states that heating pads, ibuprofen can help.  She feels the pain prevents her from taking care of her grandson and doing gardening around the house. She feels like the injection she had 2 years ago only helped for 1 day.  She has tried chiropracters, physical therapy, dry needling in the past.  She finds some benefit in her last 2 sessions of her physical therapy as well as massages every week. She continues to take gabapentin (600mg at night) and tylenol 500mg (not even once a day).  She states she can't take NSAIDs due to ulcerative colitis.     Pain Scales  Best: 1/10  Worst: 10/10  Usually: 5/10  Today: 1/10      6/1/23 L3/4 Interlaminar Epidural Steroid    Interventional Pain History  21: Right L2/3 L3/4 TFESI with Chaiban    Past Medical History:   Diagnosis Date    Hypertension     Postablative hypothyroidism     secondary to the radiation treatment for tongue cancer - treated by oncologist    Tongue cancer     Ulcerative colitis     Treated by Dr. Tobias    Ulcerative colitis        Past Surgical History:   Procedure Laterality Date     SECTION      CHOLECYSTECTOMY  2016    removed    CHOLECYSTECTOMY  2016    COLONOSCOPY      Dr. Tobias    COLONOSCOPY N/A 2021    Procedure: COLONOSCOPY Suprep;  Surgeon: Chetan Forte MD;  Location: [facility];  Service: Endoscopy;  Laterality: N/A;    feeding tube      groin surgery  2017    swollen lump node    HYSTERECTOMY      OOPHORECTOMY      PORTACATH PLACEMENT      TONSILLECTOMY      TRANSFORAMINAL EPIDURAL INJECTION OF STEROID Right  12/21/2021    Procedure: LUMBAR TRANSFORAMINAL RIGHT L2/3 AND L3/4 DIRECT REFERRAL ;  Surgeon: Tana Beckwith MD;  Location: [facility];  Service: Pain Management;  Laterality: Right;    TUBAL LIGATION         Review of patient's allergies indicates:   Allergen Reactions    Amoxicillin      Other reaction(s): fever blisters    Nsaids (non-steroidal anti-inflammatory drug)      Other reaction(s): nausea, stomach issues       Current Outpatient Medications   Medication Sig Dispense Refill    amLODIPine (NORVASC) 10 MG tablet Take 1 tablet (10 mg total) by mouth once daily. 90 tablet 3    BREO ELLIPTA 100-25 mcg/dose diskus inhaler INHALE 1 PUFF INTO THE LUNGS ONCE DAILY 180 each 2    gabapentin (NEURONTIN) 300 MG capsule Take 1 capsule (300 mg total) by mouth 3 (three) times daily. (Patient taking differently: Take 600 mg by mouth every evening.) 90 capsule 11    meclizine (ANTIVERT) 25 mg tablet Take 1 tablet (25 mg total) by mouth 3 (three) times daily as needed for Dizziness. 20 tablet 0    mesalamine (LIALDA) 1.2 gram TbEC Take 1 tablet (1.2 g total) by mouth 2 (two) times daily. 60 tablet 11    ondansetron (ZOFRAN-ODT) 4 MG TbDL Take 1 tablet (4 mg total) by mouth every 8 (eight) hours as needed (nausea). 10 tablet 0    pantoprazole (PROTONIX) 40 MG tablet Take 1 tablet (40 mg total) by mouth once daily. 30 tablet 11    SYNTHROID 75 mcg tablet Take 1 tablet (75 mcg total) by mouth before breakfast. 90 tablet 3    nitrofurantoin, macrocrystal-monohydrate, (MACROBID) 100 MG capsule Take 1 capsule (100 mg total) by mouth 2 (two) times daily. (Patient not taking: Reported on 5/10/2023) 10 capsule 0     No current facility-administered medications for this visit.       Family History   Problem Relation Age of Onset    Hypertension Father     Diabetes Father     Heart attack Father     Heart disease Father     Heart failure Father     Hyperlipidemia Father     Arthritis Father     Hypertension Mother      Arthritis Mother     Thyroid disease Sister     Thyroid disease Brother     Thyroid disease Sister     Thyroid disease Brother     Cancer Paternal Grandmother     Early death Brother     Stroke Neg Hx        Social History     Socioeconomic History    Marital status:    Tobacco Use    Smoking status: Former     Packs/day: 1.00     Years: 19.00     Pack years: 19.00     Types: Cigarettes     Start date: 1980     Quit date: 5/15/2009     Years since quittin.9    Smokeless tobacco: Never   Substance and Sexual Activity    Alcohol use: Not Currently     Comment: occasionally    Drug use: No    Sexual activity: Not Currently     Partners: Male     Birth control/protection: Post-menopausal     Social Determinants of Health     Financial Resource Strain: Low Risk     Difficulty of Paying Living Expenses: Not hard at all   Food Insecurity: No Food Insecurity    Worried About Running Out of Food in the Last Year: Never true    Ran Out of Food in the Last Year: Never true   Transportation Needs: No Transportation Needs    Lack of Transportation (Medical): No    Lack of Transportation (Non-Medical): No   Physical Activity: Sufficiently Active    Days of Exercise per Week: 5 days    Minutes of Exercise per Session: 50 min   Stress: Stress Concern Present    Feeling of Stress : To some extent   Social Connections: Unknown    Frequency of Communication with Friends and Family: More than three times a week    Frequency of Social Gatherings with Friends and Family: More than three times a week    Active Member of Clubs or Organizations: Yes    Attends Club or Organization Meetings: More than 4 times per year    Marital Status:    Housing Stability: Low Risk     Unable to Pay for Housing in the Last Year: No    Number of Places Lived in the Last Year: 1    Unstable Housing in the Last Year: No       Imaging:  MRI LUMBAR SPINE WITHOUT CONTRAST     CLINICAL HISTORY:  lumbar radiculopathy; Radiculopathy, lumbar  region     TECHNIQUE:  Multiplanar, multisequence MR images were acquired from the thoracolumbar junction to the sacrum without the administration of contrast.     COMPARISON:  Radiographs 10/14/2021     FINDINGS:  There is a mild levocurvature of the upper lumbar spine.  The vertebral body heights are well maintained.  There is severe disc space narrowing at T12-L1, L1-L2, L2-L3 and L4-5.     No evidence of malignant bone marrow replacement process or infection.     Adjacent endplate edema noted at L2-L3, at the concavity of the scoliosis consistent with active degenerative disc disease.     The conus medullaris terminates in good position.     T12-L1: Mild diffuse disc bulge, small right paracentral disc protrusion, no canal stenosis or foraminal narrowing.     L1-L2: Diffuse disc bulge and ligamentum flavum hypertrophy, no canal stenosis.  There is mild right foraminal narrowing.     L2-L3: Diffuse disc bulge and facet joint degenerative change, no greater than mild central canal narrowing, there is mild right foraminal narrowing.     L3-L4: Mild diffuse disc bulge, ligamentum flavum hypertrophy and moderate facet joint osseous hypertrophy result in moderate canal stenosis.  There is moderate left foraminal narrowing.     L4-L5: Diffuse disc bulge and mild facet joint osseous hypertrophy, no canal stenosis.  There is mild bilateral foraminal narrowing.     L5-S1: No disc abnormality, no canal stenosis.  There is mild right foraminal narrowing.     The upper sacrum and upper sacroiliac joints appear normal.     The paraspinal soft tissues appear normal.     Impression:     Moderate canal stenosis at L3-L4 due to diffuse disc bulge, facet joint osseous hypertrophy and ligamentum flavum hypertrophy.     Adjacent endplate edema at L2-3 consistent with active degenerative disc disease.        Electronically signed by: Haylie Laurent MD  Date:                                            10/26/2021  Time:            "                                17:14    Review of Systems   Constitutional: Positive for diaphoresis. Negative for chills and fever.   HENT:  Negative for sore throat.    Eyes:  Negative for blurred vision and double vision.   Cardiovascular:  Negative for chest pain and palpitations.   Respiratory:  Negative for shortness of breath.    Hematologic/Lymphatic: Does not bruise/bleed easily.   Skin:  Negative for rash.   Musculoskeletal:  Positive for back pain and myalgias.   Gastrointestinal:  Negative for abdominal pain, constipation, heartburn, nausea and vomiting.   Genitourinary:  Negative for dysuria, hematuria and urgency.   Neurological:  Negative for headaches and weakness.   Psychiatric/Behavioral:  Negative for depression and substance abuse. The patient has insomnia.          Objective:   /81 (BP Location: Right arm, Patient Position: Sitting, BP Method: Medium (Automatic))   Pulse 77   Ht 5' 2" (1.575 m)   Wt 52.6 kg (115 lb 15.4 oz)   BMI 21.21 kg/m²   Pain Disability Index Review:  No flowsheet data found.  Normocephalic.  Atraumatic.  Affect appropriate.  Breathing unlabored.  Extra ocular muscles intact.           General    Constitutional: She is oriented to person, place, and time. She appears well-developed and well-nourished.   HENT:   Head: Normocephalic and atraumatic.   Eyes: EOM are normal.   Cardiovascular:  Normal rate.            Pulmonary/Chest: Effort normal.   Abdominal: There is no abdominal tenderness.   Neurological: She is alert and oriented to person, place, and time. She has normal reflexes.   Psychiatric: She has a normal mood and affect. Her behavior is normal. Judgment and thought content normal.             Muscle Strength   Right Lower Extremity   Hip Flexion: 5/5   Hip Extensors: 5/5  Quadriceps:  5/5   Hamstrin/5   Anterior tibial:  5/5   Gastrocsoleus:  5/5   EHL:  5/5  Left Lower Extremity   Hip Flexion: 5/5   Hip Extensors: 5/5  Quadriceps:  5/5 "   Hamstrin/5   Anterior tibial:  5/5   Gastrocsoleus:  5/5   EHL:  5/5    Reflexes     Left Side  Biceps:  2+  Triceps:  2+  Brachioradialis:  2+  Achilles:  2+  Ankle Clonus:  absent  Quadriceps:  2+    Right Side   Biceps:  2+  Triceps:  2+  Brachioradialis:  2+  Achilles:  2+  Ankle Clonus:  absent  Quadriceps:  1+      Assessment:       Encounter Diagnoses   Name Primary?    Osteoarthritis of spine with radiculopathy, lumbar region Yes    Right hip pain          Plan:   We discussed with the patient the assessment and recommendations. The following is the plan we agreed on:        Diagnoses and all orders for this visit:    Osteoarthritis of spine with radiculopathy, lumbar region    Right hip pain  -     X-Ray Hip 2 or 3 views Right (with Pelvis when performed); Future    - I have stressed the importance of physical activity and a home exercise plan to help with pain and improve health.  - Patient can continue with medications for now since they are providing benefits, using them appropriately, and without side effects.  - considering her good, but short-lived benefit from her last transforaminal epidural steroid injection in , we can consider right hip pain as a possible etiology of her pain  - xray right hip and pelvis  - follow up in 4 - 6 weeks  - plan for right hip injection for diagnostic and therapeutic reasons  - if her hip injection fails to confer adequate relief, we can consider repeating right transforaminal epidural injection at a shifted level (right L3/4 and L4/5)  - continue with physical therapy for right hip and back  - Counseled patient regarding the importance of activity modification and physical therapy.    Marjan Mcgregor MD [facility] Fellow      I have personally taken the history and examined this patient and agree with the fellow's note as stated above.

## 2023-05-11 LAB — BACTERIA UR CULT: NORMAL

## 2023-05-12 ENCOUNTER — PATIENT MESSAGE (OUTPATIENT)
Dept: UROLOGY | Facility: CLINIC | Age: 63
End: 2023-05-12
Payer: COMMERCIAL

## 2023-05-16 ENCOUNTER — TELEPHONE (OUTPATIENT)
Dept: UROLOGY | Facility: CLINIC | Age: 63
End: 2023-05-16
Payer: COMMERCIAL

## 2023-05-16 NOTE — TELEPHONE ENCOUNTER
----- Message from Familia Royal NP sent at 5/15/2023  5:00 PM CDT -----  Please inform patient via telephone that her urine cx was normal. However, U/A still showed blood. CT urogram still recommended for further evaluation.

## 2023-05-23 DIAGNOSIS — K51.80 OTHER ULCERATIVE COLITIS WITHOUT COMPLICATION: ICD-10-CM

## 2023-05-23 RX ORDER — MESALAMINE 1.2 G/1
TABLET, DELAYED RELEASE ORAL
Qty: 180 TABLET | Refills: 3 | Status: SHIPPED | OUTPATIENT
Start: 2023-05-23 | End: 2023-08-15

## 2023-05-26 ENCOUNTER — OFFICE VISIT (OUTPATIENT)
Dept: PRIMARY CARE CLINIC | Facility: CLINIC | Age: 63
End: 2023-05-26
Payer: COMMERCIAL

## 2023-05-26 VITALS
OXYGEN SATURATION: 97 % | BODY MASS INDEX: 21.59 KG/M2 | HEIGHT: 62 IN | DIASTOLIC BLOOD PRESSURE: 68 MMHG | HEART RATE: 84 BPM | WEIGHT: 117.31 LBS | SYSTOLIC BLOOD PRESSURE: 104 MMHG

## 2023-05-26 DIAGNOSIS — M51.36 DEGENERATIVE DISC DISEASE, LUMBAR: ICD-10-CM

## 2023-05-26 DIAGNOSIS — E89.0 POSTABLATIVE HYPOTHYROIDISM: ICD-10-CM

## 2023-05-26 DIAGNOSIS — K51.80 OTHER ULCERATIVE COLITIS WITHOUT COMPLICATION: ICD-10-CM

## 2023-05-26 DIAGNOSIS — Z79.899 ENCOUNTER FOR LONG-TERM (CURRENT) USE OF MEDICATIONS: ICD-10-CM

## 2023-05-26 DIAGNOSIS — Z00.00 ANNUAL PHYSICAL EXAM: Primary | ICD-10-CM

## 2023-05-26 DIAGNOSIS — I10 ESSENTIAL HYPERTENSION: ICD-10-CM

## 2023-05-26 DIAGNOSIS — Z85.810 HISTORY OF TONGUE CANCER: ICD-10-CM

## 2023-05-26 PROBLEM — M25.60 DECREASED RANGE OF MOTION WITH DECREASED STRENGTH: Status: RESOLVED | Noted: 2023-05-02 | Resolved: 2023-05-26

## 2023-05-26 PROBLEM — R68.89 IMPAIRED TOLERANCE OF ACTIVITY: Status: RESOLVED | Noted: 2023-05-02 | Resolved: 2023-05-26

## 2023-05-26 PROBLEM — R53.1 DECREASED RANGE OF MOTION WITH DECREASED STRENGTH: Status: RESOLVED | Noted: 2023-05-02 | Resolved: 2023-05-26

## 2023-05-26 PROCEDURE — 3008F BODY MASS INDEX DOCD: CPT | Mod: CPTII,S$GLB,, | Performed by: INTERNAL MEDICINE

## 2023-05-26 PROCEDURE — 3078F DIAST BP <80 MM HG: CPT | Mod: CPTII,S$GLB,, | Performed by: INTERNAL MEDICINE

## 2023-05-26 PROCEDURE — 3078F PR MOST RECENT DIASTOLIC BLOOD PRESSURE < 80 MM HG: ICD-10-PCS | Mod: CPTII,S$GLB,, | Performed by: INTERNAL MEDICINE

## 2023-05-26 PROCEDURE — 3074F PR MOST RECENT SYSTOLIC BLOOD PRESSURE < 130 MM HG: ICD-10-PCS | Mod: CPTII,S$GLB,, | Performed by: INTERNAL MEDICINE

## 2023-05-26 PROCEDURE — 99396 PREV VISIT EST AGE 40-64: CPT | Mod: S$GLB,,, | Performed by: INTERNAL MEDICINE

## 2023-05-26 PROCEDURE — 99999 PR PBB SHADOW E&M-EST. PATIENT-LVL III: CPT | Mod: PBBFAC,,, | Performed by: INTERNAL MEDICINE

## 2023-05-26 PROCEDURE — 1159F MED LIST DOCD IN RCRD: CPT | Mod: CPTII,S$GLB,, | Performed by: INTERNAL MEDICINE

## 2023-05-26 PROCEDURE — 99396 PR PREVENTIVE VISIT,EST,40-64: ICD-10-PCS | Mod: S$GLB,,, | Performed by: INTERNAL MEDICINE

## 2023-05-26 PROCEDURE — 99999 PR PBB SHADOW E&M-EST. PATIENT-LVL III: ICD-10-PCS | Mod: PBBFAC,,, | Performed by: INTERNAL MEDICINE

## 2023-05-26 PROCEDURE — 3008F PR BODY MASS INDEX (BMI) DOCUMENTED: ICD-10-PCS | Mod: CPTII,S$GLB,, | Performed by: INTERNAL MEDICINE

## 2023-05-26 PROCEDURE — 1159F PR MEDICATION LIST DOCUMENTED IN MEDICAL RECORD: ICD-10-PCS | Mod: CPTII,S$GLB,, | Performed by: INTERNAL MEDICINE

## 2023-05-26 PROCEDURE — 3074F SYST BP LT 130 MM HG: CPT | Mod: CPTII,S$GLB,, | Performed by: INTERNAL MEDICINE

## 2023-05-26 RX ORDER — LEVOTHYROXINE SODIUM 75 UG/1
75 TABLET ORAL
Qty: 90 TABLET | Refills: 3 | Status: SHIPPED | OUTPATIENT
Start: 2023-05-26 | End: 2024-05-25

## 2023-05-26 NOTE — PROGRESS NOTES
[facility] Note    Chief Complaint      Chief Complaint   Patient presents with    Follow-up     History of Present Illness      Sarahy Ma is a 63 y.o. female who presents today for Annual preventative visit.  Patient comes to appointment alone.    Problem List Items Addressed This Visit       Postablative hypothyroidism    Current Assessment & Plan     On mesalamine, works well for her.  Sees Dr. Forte.  Cscope 2/2021.           Relevant Medications    SYNTHROID 75 mcg tablet    Other Relevant Orders    TSH    T4, FREE    Ulcerative colitis    Current Assessment & Plan     On mesalamine, works well for her.  Sees Dr. Forte.  Cscope 2/2021.  Has been doing better.           History of tongue cancer    Overview     Diagnosed/treated at [facility] in 2009.  Reportedly Stage 3 at time of diagnosis => treated with XRT/chemotherapy.           Current Assessment & Plan     Sees Dr. Dobbins, sees annually.  No longer needs scans.           Degenerative disc disease, lumbar    Current Assessment & Plan     Seeing Dr. Thornton pain mgmt, last MRI 10/2022, planning for L3-L4 KALLIE in 6/2023.Taking tylenol and gabapentin, avoiding NSAIDS 2/2 UC.  Did PT, now doing exercises at home.    10/2021 MRI  Moderate canal stenosis at L3-L4 due to diffuse disc bulge, facet joint osseous hypertrophy and ligamentum flavum hypertrophy.  Adjacent endplate edema at L2-3 consistent with active degenerative disc disease.              Essential hypertension    Current Assessment & Plan     BP controlled on Norvasc 10 mg, no SOB/CP/HA.            Other Visit Diagnoses       Annual physical exam    -  Primary    Relevant Orders    CBC Auto Differential    Lipid Panel    Comprehensive Metabolic Panel    Encounter for long-term (current) use of medications        Relevant Orders    Hemoglobin A1C              Health Maintenance   Topic Date Due    Mammogram  03/23/2023    TETANUS VACCINE  06/02/2027    Lipid Panel   10/26/2027    Hepatitis C Screening  Completed       Past Medical History:   Diagnosis Date    Hypertension     Postablative hypothyroidism     secondary to the radiation treatment for tongue cancer - treated by oncologist    Tongue cancer     Ulcerative colitis     Treated by Dr. Tobias    Ulcerative colitis        Past Surgical History:   Procedure Laterality Date     SECTION      CHOLECYSTECTOMY  2016    removed    CHOLECYSTECTOMY  2016    COLONOSCOPY      Dr. Tobias    COLONOSCOPY N/A 2021    Procedure: COLONOSCOPY Suprep;  Surgeon: Chetan Forte MD;  Location: [facility];  Service: Endoscopy;  Laterality: N/A;    feeding tube      groin surgery  2017    swollen lump node    HYSTERECTOMY      OOPHORECTOMY      PORTACATH PLACEMENT      TONSILLECTOMY      TRANSFORAMINAL EPIDURAL INJECTION OF STEROID Right 2021    Procedure: LUMBAR TRANSFORAMINAL RIGHT L2/3 AND L3/4 DIRECT REFERRAL ;  Surgeon: Tana Beckwith MD;  Location: [facility];  Service: Pain Management;  Laterality: Right;    TUBAL LIGATION         family history includes Arthritis in her father and mother; Cancer in her paternal grandmother; Diabetes in her father; Early death in her brother; Heart attack in her father; Heart disease in her father; Heart failure in her father; Hyperlipidemia in her father; Hypertension in her father and mother; Thyroid disease in her brother, brother, sister, and sister.    Social History     Tobacco Use    Smoking status: Former     Packs/day: 1.00     Years: 19.00     Pack years: 19.00     Types: Cigarettes     Start date: 1980     Quit date: 5/15/2009     Years since quittin.0    Smokeless tobacco: Never   Substance Use Topics    Alcohol use: Not Currently     Comment: occasionally    Drug use: No       Review of Systems   Constitutional:  Negative for chills and fever.   Respiratory:  Negative for cough and shortness of breath.    Cardiovascular:  Negative for  chest pain and palpitations.   Gastrointestinal:  Negative for constipation, diarrhea, nausea and vomiting.   Genitourinary:  Negative for dysuria and hematuria.   Musculoskeletal:  Negative for falls.   Neurological:  Negative for headaches.      Outpatient Encounter Medications as of 5/26/2023   Medication Sig Dispense Refill    amLODIPine (NORVASC) 10 MG tablet Take 1 tablet (10 mg total) by mouth once daily. 90 tablet 3    BREO ELLIPTA 100-25 mcg/dose diskus inhaler INHALE 1 PUFF INTO THE LUNGS ONCE DAILY 180 each 2    gabapentin (NEURONTIN) 300 MG capsule Take 1 capsule (300 mg total) by mouth 3 (three) times daily. (Patient taking differently: Take 600 mg by mouth every evening.) 90 capsule 11    meclizine (ANTIVERT) 25 mg tablet Take 1 tablet (25 mg total) by mouth 3 (three) times daily as needed for Dizziness. 20 tablet 0    mesalamine (LIALDA) 1.2 gram TbEC TAKE 1 TABLET BY MOUTH TWICE A  tablet 3    ondansetron (ZOFRAN-ODT) 4 MG TbDL Take 1 tablet (4 mg total) by mouth every 8 (eight) hours as needed (nausea). 10 tablet 0    pantoprazole (PROTONIX) 40 MG tablet Take 1 tablet (40 mg total) by mouth once daily. 30 tablet 11    [DISCONTINUED] SYNTHROID 75 mcg tablet Take 1 tablet (75 mcg total) by mouth before breakfast. 90 tablet 3    SYNTHROID 75 mcg tablet Take 1 tablet (75 mcg total) by mouth before breakfast. 90 tablet 3    [DISCONTINUED] gabapentin (NEURONTIN) 100 MG capsule Take 1 capsule (100 mg total) by mouth 3 (three) times daily. (Patient not taking: Reported on 5/10/2023) 90 capsule 11    [DISCONTINUED] mesalamine (LIALDA) 1.2 gram TbEC Take 1 tablet (1.2 g total) by mouth 2 (two) times daily. 60 tablet 6    [DISCONTINUED] mesalamine (LIALDA) 1.2 gram TbEC Take 1 tablet (1.2 g total) by mouth 2 (two) times daily. 60 tablet 11    [DISCONTINUED] nitrofurantoin, macrocrystal-monohydrate, (MACROBID) 100 MG capsule Take 1 capsule (100 mg total) by mouth 2 (two) times daily. (Patient not taking:  "Reported on 5/10/2023) 10 capsule 0     No facility-administered encounter medications on file as of 5/26/2023.        Review of patient's allergies indicates:   Allergen Reactions    Amoxicillin      Other reaction(s): fever blisters    Nsaids (non-steroidal anti-inflammatory drug)      Other reaction(s): nausea, stomach issues       Physical Exam      Vital Signs  Pulse: 84  SpO2: 97 %  BP: 104/68  Pain Score: 0-No pain  Height and Weight  Height: 5' 2" (157.5 cm)  Weight: 53.2 kg (117 lb 4.6 oz)  BSA (Calculated - sq m): 1.53 sq meters  BMI (Calculated): 21.4  Weight in (lb) to have BMI = 25: 136.4]    Physical Exam  Constitutional:       Appearance: She is well-developed.   HENT:      Head: Normocephalic and atraumatic.   Cardiovascular:      Rate and Rhythm: Normal rate and regular rhythm.      Heart sounds: Normal heart sounds. No murmur heard.  Pulmonary:      Effort: Pulmonary effort is normal. No respiratory distress.      Breath sounds: Normal breath sounds.   Abdominal:      General: There is no distension.      Palpations: Abdomen is soft.      Tenderness: There is no abdominal tenderness. There is no guarding.   Skin:     General: Skin is warm and dry.   Neurological:      Mental Status: She is alert. Mental status is at baseline.   Psychiatric:         Behavior: Behavior normal.        Laboratory:  CBC:  No results for input(s): WBC, RBC, HGB, HCT, PLT, MCV, MCH, MCHC in the last 2160 hours.    CMP:  No results for input(s): GLU, CALCIUM, ALBUMIN, PROT, NA, K, CO2, CL, BUN, ALKPHOS, ALT, AST, BILITOT in the last 2160 hours.    Invalid input(s): CREATININ    URINALYSIS:  Recent Labs   Lab Result Units 05/03/23  1052 05/10/23  1113   Color, UA  Yellow Yellow   Specific Gravity, UA  1.015 1.015   pH, UA  7.0 6.0   Protein, UA  Negative Negative   Bacteria /hpf Rare  --    Nitrite, UA  Negative Negative   Leukocytes, UA  Trace* 1+*   Urobilinogen, UA EU/dL Negative  --       LIPIDS:  Recent Labs   Lab " Result Units 05/03/23  1034   TSH uIU/mL 2.690     TSH:  Recent Labs   Lab Result Units 05/03/23  1034   TSH uIU/mL 2.690     A1C:  No results for input(s): HGBA1C in the last 2160 hours.    Radiology:  Mammo Digital Screening Bilat w/ Oren    Result Date: 3/24/2022  Result:  Mammo Digital Screening Bilat w/ Oren    History:  Patient is 61 y.o. and is seen for a screening mammogram.      Films Compared:  Compared to: 02/26/2021 Mammo Digital Screening Bilat w/ Oren and   02/17/2020 Mammo Digital Screening Bilat w/ Oren     Findings:   This procedure was performed using tomosynthesis.   Computer-aided detection was utilized in the interpretation of this   examination.    The breasts are heterogeneously dense, which may obscure small masses.   There is no evidence of suspicious masses, microcalcifications or   architectural distortion.           Assessment/Plan     Sarahy Ma is a 63 y.o.female with:    1. Annual physical exam  - CBC Auto Differential; Future  - Lipid Panel; Future  - Comprehensive Metabolic Panel; Future    2. Other ulcerative colitis without complication    3. Postablative hypothyroidism  - SYNTHROID 75 mcg tablet; Take 1 tablet (75 mcg total) by mouth before breakfast.  Dispense: 90 tablet; Refill: 3  - TSH; Future  - T4, FREE; Future    4. Essential hypertension    5. Degenerative disc disease, lumbar    6. History of tongue cancer    7. Encounter for long-term (current) use of medications  - Hemoglobin A1C; Future      -decrease norvasc 5 mg daily  -counseled on scheduling urogram for hematuria workup  -Continue current medications and maintain follow up with specialists.    -Follow up in about 6 months (around 11/26/2023) for Annual Visit.       Barb Dixon MD  [facility]

## 2023-06-06 ENCOUNTER — PATIENT MESSAGE (OUTPATIENT)
Dept: PRIMARY CARE CLINIC | Facility: CLINIC | Age: 63
End: 2023-06-06
Payer: COMMERCIAL

## 2023-06-06 DIAGNOSIS — R31.9 HEMATURIA, UNSPECIFIED TYPE: Primary | ICD-10-CM

## 2023-06-07 ENCOUNTER — OFFICE VISIT (OUTPATIENT)
Dept: PAIN MEDICINE | Facility: CLINIC | Age: 63
End: 2023-06-07
Payer: COMMERCIAL

## 2023-06-07 ENCOUNTER — PATIENT MESSAGE (OUTPATIENT)
Dept: PRIMARY CARE CLINIC | Facility: CLINIC | Age: 63
End: 2023-06-07
Payer: COMMERCIAL

## 2023-06-07 VITALS
DIASTOLIC BLOOD PRESSURE: 73 MMHG | SYSTOLIC BLOOD PRESSURE: 102 MMHG | HEIGHT: 62 IN | HEART RATE: 83 BPM | WEIGHT: 117.31 LBS | BODY MASS INDEX: 21.59 KG/M2

## 2023-06-07 DIAGNOSIS — M51.36 DDD (DEGENERATIVE DISC DISEASE), LUMBAR: ICD-10-CM

## 2023-06-07 DIAGNOSIS — M47.816 LUMBAR SPONDYLOSIS: ICD-10-CM

## 2023-06-07 DIAGNOSIS — M25.551 RIGHT HIP PAIN: ICD-10-CM

## 2023-06-07 DIAGNOSIS — M54.16 LUMBAR RADICULOPATHY: Primary | ICD-10-CM

## 2023-06-07 PROCEDURE — 1160F RVW MEDS BY RX/DR IN RCRD: CPT | Mod: CPTII,S$GLB,, | Performed by: NURSE PRACTITIONER

## 2023-06-07 PROCEDURE — 1159F MED LIST DOCD IN RCRD: CPT | Mod: CPTII,S$GLB,, | Performed by: NURSE PRACTITIONER

## 2023-06-07 PROCEDURE — 1159F PR MEDICATION LIST DOCUMENTED IN MEDICAL RECORD: ICD-10-PCS | Mod: CPTII,S$GLB,, | Performed by: NURSE PRACTITIONER

## 2023-06-07 PROCEDURE — 3074F SYST BP LT 130 MM HG: CPT | Mod: CPTII,S$GLB,, | Performed by: NURSE PRACTITIONER

## 2023-06-07 PROCEDURE — 99213 PR OFFICE/OUTPT VISIT, EST, LEVL III, 20-29 MIN: ICD-10-PCS | Mod: S$GLB,,, | Performed by: NURSE PRACTITIONER

## 2023-06-07 PROCEDURE — 99213 OFFICE O/P EST LOW 20 MIN: CPT | Mod: S$GLB,,, | Performed by: NURSE PRACTITIONER

## 2023-06-07 PROCEDURE — 3008F BODY MASS INDEX DOCD: CPT | Mod: CPTII,S$GLB,, | Performed by: NURSE PRACTITIONER

## 2023-06-07 PROCEDURE — 99999 PR PBB SHADOW E&M-EST. PATIENT-LVL III: CPT | Mod: PBBFAC,,, | Performed by: NURSE PRACTITIONER

## 2023-06-07 PROCEDURE — 1160F PR REVIEW ALL MEDS BY PRESCRIBER/CLIN PHARMACIST DOCUMENTED: ICD-10-PCS | Mod: CPTII,S$GLB,, | Performed by: NURSE PRACTITIONER

## 2023-06-07 PROCEDURE — 3008F PR BODY MASS INDEX (BMI) DOCUMENTED: ICD-10-PCS | Mod: CPTII,S$GLB,, | Performed by: NURSE PRACTITIONER

## 2023-06-07 PROCEDURE — 99999 PR PBB SHADOW E&M-EST. PATIENT-LVL III: ICD-10-PCS | Mod: PBBFAC,,, | Performed by: NURSE PRACTITIONER

## 2023-06-07 PROCEDURE — 3074F PR MOST RECENT SYSTOLIC BLOOD PRESSURE < 130 MM HG: ICD-10-PCS | Mod: CPTII,S$GLB,, | Performed by: NURSE PRACTITIONER

## 2023-06-07 PROCEDURE — 3078F DIAST BP <80 MM HG: CPT | Mod: CPTII,S$GLB,, | Performed by: NURSE PRACTITIONER

## 2023-06-07 PROCEDURE — 3078F PR MOST RECENT DIASTOLIC BLOOD PRESSURE < 80 MM HG: ICD-10-PCS | Mod: CPTII,S$GLB,, | Performed by: NURSE PRACTITIONER

## 2023-06-07 NOTE — PROGRESS NOTES
Chronic patient Established Note (Follow up visit)      SUBJECTIVE:    Interval History 6/7/2023:  Sarahy Ma presents to the clinic for a follow-up appointment for back pain. She continues to report low back pain with intermittent radiating pain into the anterolateral aspect of her thighs. Her pain is worse with prolonged sitting. She also notes increased pain with lifting. Certain shoes will also cause increased pain. She is using ice and heat. She also takes Gabapentin. She continues to perform a home exercise routine. She denies any other health changes. Her pain today is 1/10.    HPI:  Sarahy Ma presents for chronic lower back pain and right leg pain.  She states that the pain does often wrap around the leg (top of her thigh toward her groin, and sometimes down the side of her leg (lateral) to the foot.  She states that heating pads, ibuprofen can help.  She feels the pain prevents her from taking care of her grandson and doing gardening around the house. She feels like the injection she had 2 years ago only helped for 1 day.  She has tried chiropracters, physical therapy, dry needling in the past.  She finds some benefit in her last 2 sessions of her physical therapy as well as massages every week. She continues to take gabapentin (600mg at night) and tylenol 500mg (not even once a day).  She states she can't take NSAIDs due to ulcerative colitis.       Pain Disability Index Review:  No flowsheet data found.    Pain Medications:  Gabapentin     Opioid Contract: not applicable     report:  Not applicable    Pain Procedures:   12/21/2021- Right L2/3 and L3/4 TF KALLIE    Physical Therapy/Home Exercise: yes    Imaging:   Xray Hips 5/24/2023:  FINDINGS:  There is no acute fracture or dislocation. Bones are somewhat demineralized. No aggressive lytic or blastic lesion. No osseous erosion or aggressive periosteal reaction seen. Joint spaces are preserved with normal alignment. No significant joint  effusion. Small pelvic phlebolith noted.  Soft tissues are otherwise unremarkable.        Impression:     Mild osteopenia.  Otherwise, unremarkable radiographic appearance of the right hip.    MRI Lumbar Spine 10/26/2021:  COMPARISON:  Radiographs 10/14/2021     FINDINGS:  There is a mild levocurvature of the upper lumbar spine.  The vertebral body heights are well maintained.  There is severe disc space narrowing at T12-L1, L1-L2, L2-L3 and L4-5.     No evidence of malignant bone marrow replacement process or infection.     Adjacent endplate edema noted at L2-L3, at the concavity of the scoliosis consistent with active degenerative disc disease.     The conus medullaris terminates in good position.     T12-L1: Mild diffuse disc bulge, small right paracentral disc protrusion, no canal stenosis or foraminal narrowing.     L1-L2: Diffuse disc bulge and ligamentum flavum hypertrophy, no canal stenosis.  There is mild right foraminal narrowing.     L2-L3: Diffuse disc bulge and facet joint degenerative change, no greater than mild central canal narrowing, there is mild right foraminal narrowing.     L3-L4: Mild diffuse disc bulge, ligamentum flavum hypertrophy and moderate facet joint osseous hypertrophy result in moderate canal stenosis.  There is moderate left foraminal narrowing.     L4-L5: Diffuse disc bulge and mild facet joint osseous hypertrophy, no canal stenosis.  There is mild bilateral foraminal narrowing.     L5-S1: No disc abnormality, no canal stenosis.  There is mild right foraminal narrowing.     The upper sacrum and upper sacroiliac joints appear normal.     The paraspinal soft tissues appear normal.     Impression:     Moderate canal stenosis at L3-L4 due to diffuse disc bulge, facet joint osseous hypertrophy and ligamentum flavum hypertrophy.     Adjacent endplate edema at L2-3 consistent with active degenerative disc disease.    Allergies:   Review of patient's allergies indicates:   Allergen  Reactions    Amoxicillin      Other reaction(s): fever blisters    Nsaids (non-steroidal anti-inflammatory drug)      Other reaction(s): nausea, stomach issues       Current Medications:   Current Outpatient Medications   Medication Sig Dispense Refill    amLODIPine (NORVASC) 10 MG tablet Take 1 tablet (10 mg total) by mouth once daily. 90 tablet 3    BREO ELLIPTA 100-25 mcg/dose diskus inhaler INHALE 1 PUFF INTO THE LUNGS ONCE DAILY 180 each 2    gabapentin (NEURONTIN) 300 MG capsule Take 1 capsule (300 mg total) by mouth 3 (three) times daily. (Patient taking differently: Take 600 mg by mouth every evening.) 90 capsule 11    meclizine (ANTIVERT) 25 mg tablet Take 1 tablet (25 mg total) by mouth 3 (three) times daily as needed for Dizziness. 20 tablet 0    mesalamine (LIALDA) 1.2 gram TbEC TAKE 1 TABLET BY MOUTH TWICE A  tablet 3    ondansetron (ZOFRAN-ODT) 4 MG TbDL Take 1 tablet (4 mg total) by mouth every 8 (eight) hours as needed (nausea). 10 tablet 0    pantoprazole (PROTONIX) 40 MG tablet Take 1 tablet (40 mg total) by mouth once daily. 30 tablet 11    SYNTHROID 75 mcg tablet Take 1 tablet (75 mcg total) by mouth before breakfast. 90 tablet 3     No current facility-administered medications for this visit.       REVIEW OF SYSTEMS:    GENERAL:  No weight loss, malaise or fevers.  HEENT:  Negative for frequent or significant headaches.  NECK:  Negative for lumps, goiter, pain and significant neck swelling.  RESPIRATORY:  Negative for cough, wheezing or shortness of breath.  CARDIOVASCULAR:  Negative for chest pain, leg swelling or palpitations. HTN  GI:  Negative for abdominal discomfort, blood in stools or black stools or change in bowel habits. Ulcerative Colitis  MUSCULOSKELETAL:  See HPI.  SKIN:  Negative for lesions, rash, and itching.  PSYCH:  Negative for sleep disturbance, mood disorder and recent psychosocial stressors.  HEMATOLOGY/LYMPHOLOGY:  Negative for prolonged bleeding, bruising easily or  swollen nodes.  NEURO:   No history of headaches, syncope, paralysis, seizures or tremors.  ENDO: Thyroid disorder  All other reviewed and negative other than HPI.    Past Medical History:  Past Medical History:   Diagnosis Date    Hypertension     Postablative hypothyroidism     secondary to the radiation treatment for tongue cancer - treated by oncologist    Tongue cancer     Ulcerative colitis     Treated by Dr. Tobias    Ulcerative colitis        Past Surgical History:  Past Surgical History:   Procedure Laterality Date     SECTION      CHOLECYSTECTOMY  2016    removed    CHOLECYSTECTOMY  2016    COLONOSCOPY      Dr. Tobias    COLONOSCOPY N/A 2021    Procedure: COLONOSCOPY Suprep;  Surgeon: Chetan Forte MD;  Location: [facility];  Service: Endoscopy;  Laterality: N/A;    feeding tube      groin surgery  2017    swollen lump node    HYSTERECTOMY      OOPHORECTOMY      PORTACATH PLACEMENT      TONSILLECTOMY      TRANSFORAMINAL EPIDURAL INJECTION OF STEROID Right 2021    Procedure: LUMBAR TRANSFORAMINAL RIGHT L2/3 AND L3/4 DIRECT REFERRAL ;  Surgeon: Tana Beckwith MD;  Location: [facility] PAIN T;  Service: Pain Management;  Laterality: Right;    TUBAL LIGATION         Family History:  Family History   Problem Relation Age of Onset    Hypertension Father     Diabetes Father     Heart attack Father     Heart disease Father     Heart failure Father     Hyperlipidemia Father     Arthritis Father     Hypertension Mother     Arthritis Mother     Thyroid disease Sister     Thyroid disease Brother     Thyroid disease Sister     Thyroid disease Brother     Cancer Paternal Grandmother     Early death Brother     Stroke Neg Hx        Social History:  Social History     Socioeconomic History    Marital status:    Tobacco Use    Smoking status: Former     Packs/day: 1.00     Years: 19.00     Pack years: 19.00     Types: Cigarettes     Start date: 1980     Quit date:  "5/15/2009     Years since quittin.0    Smokeless tobacco: Never   Substance and Sexual Activity    Alcohol use: Not Currently     Comment: occasionally    Drug use: No    Sexual activity: Not Currently     Partners: Male     Birth control/protection: Post-menopausal     Social Determinants of Health     Financial Resource Strain: Low Risk     Difficulty of Paying Living Expenses: Not hard at all   Food Insecurity: No Food Insecurity    Worried About Running Out of Food in the Last Year: Never true    Ran Out of Food in the Last Year: Never true   Transportation Needs: No Transportation Needs    Lack of Transportation (Medical): No    Lack of Transportation (Non-Medical): No   Physical Activity: Sufficiently Active    Days of Exercise per Week: 5 days    Minutes of Exercise per Session: 50 min   Stress: Stress Concern Present    Feeling of Stress : To some extent   Social Connections: Unknown    Frequency of Communication with Friends and Family: More than three times a week    Frequency of Social Gatherings with Friends and Family: More than three times a week    Active Member of Clubs or Organizations: Yes    Attends Club or Organization Meetings: More than 4 times per year    Marital Status:    Housing Stability: Low Risk     Unable to Pay for Housing in the Last Year: No    Number of Places Lived in the Last Year: 1    Unstable Housing in the Last Year: No       OBJECTIVE:    /73 (BP Location: Right arm, Patient Position: Sitting, BP Method: Medium (Automatic))   Pulse 83   Ht 5' 2" (1.575 m)   Wt 53.2 kg (117 lb 4.6 oz)   BMI 21.45 kg/m²     PHYSICAL EXAMINATION:    General appearance: Well appearing, in no acute distress, alert and oriented x3.  Psych:  Mood and affect appropriate.  Skin: Skin color, texture, turgor normal, no rashes or lesions, in both upper and lower body.  Head/face:  Atraumatic, normocephalic.   Cor: RRR  Pulm: Symmetric chest rise, no respiratory distress noted. "   Back: Straight leg raising in the sitting position is negative to radicular pain. Limited ROM with pain on extension.   Extremities: No deformities, edema, or skin discoloration. Good capillary refill.  Musculoskeletal: Bilateral lower extremity strength is normal and symmetric.  No atrophy or tone abnormalities are noted.  Neuro:  No loss of sensation is noted.  Gait: Normal.    ASSESSMENT: 63 y.o. year old female with low back pain, consistent with the followin. Lumbar radiculopathy        2. Lumbar spondylosis        3. DDD (degenerative disc disease), lumbar        4. Right hip pain              PLAN:     - Previous imaging was reviewed and discussed with the patient today.    - She is scheduled for KALLIE in 2 weeks.     - Continue Gabapentin.     - I have stressed the importance of physical activity and a home exercise plan to help with pain and improve health.    - RTC 2 weeks after above procedure.     The above plan and management options were discussed at length with patient. Patient is in agreement with the above and verbalized understanding.    Gabby Sawyer  2023

## 2023-06-08 ENCOUNTER — PATIENT MESSAGE (OUTPATIENT)
Dept: PRIMARY CARE CLINIC | Facility: CLINIC | Age: 63
End: 2023-06-08
Payer: COMMERCIAL

## 2023-06-19 ENCOUNTER — PATIENT MESSAGE (OUTPATIENT)
Dept: PAIN MEDICINE | Facility: OTHER | Age: 63
End: 2023-06-19
Payer: COMMERCIAL

## 2023-06-19 ENCOUNTER — TELEPHONE (OUTPATIENT)
Dept: PAIN MEDICINE | Facility: CLINIC | Age: 63
End: 2023-06-19
Payer: COMMERCIAL

## 2023-06-19 DIAGNOSIS — R06.02 SHORTNESS OF BREATH: ICD-10-CM

## 2023-06-19 RX ORDER — FLUTICASONE FUROATE AND VILANTEROL 100; 25 UG/1; UG/1
1 POWDER RESPIRATORY (INHALATION) DAILY
Qty: 180 EACH | Refills: 2 | Status: SHIPPED | OUTPATIENT
Start: 2023-06-19

## 2023-06-19 NOTE — TELEPHONE ENCOUNTER
----- Message from Sujey Ferreira sent at 6/19/2023  3:43 PM CDT -----  Regarding: cancel procedure  Name of Who is Calling:DENISE VERNON [[id number]]          What is the request in detail: pt states that she would like to cancel her procedure 6/22.  She said that the medication and exercises are working. Please assist           Can the clinic reply by [facility]:yes           What Number to Call Back if not in [facility]:[phone]

## 2023-06-19 NOTE — TELEPHONE ENCOUNTER
No care due was identified.  [facility] Embedded Care Due Messages. Reference number: [id number].   6/19/2023 11:59:31 AM CDT

## 2023-06-28 ENCOUNTER — PATIENT MESSAGE (OUTPATIENT)
Dept: UROLOGY | Facility: CLINIC | Age: 63
End: 2023-06-28
Payer: COMMERCIAL

## 2023-06-28 DIAGNOSIS — R31.29 MICROSCOPIC HEMATURIA: Primary | ICD-10-CM

## 2023-06-28 NOTE — PROGRESS NOTES
Diagnoses and all orders for this visit:    Microscopic hematuria  -     Creatinine, Serum; Future    NOTE: Schedule CT urogram previously ordered.    Familia Royal NP

## 2023-06-30 ENCOUNTER — OFFICE VISIT (OUTPATIENT)
Dept: GASTROENTEROLOGY | Facility: CLINIC | Age: 63
End: 2023-06-30
Payer: COMMERCIAL

## 2023-06-30 VITALS — WEIGHT: 118.81 LBS | BODY MASS INDEX: 21.73 KG/M2

## 2023-06-30 DIAGNOSIS — R10.13 EPIGASTRIC PAIN: Primary | ICD-10-CM

## 2023-06-30 DIAGNOSIS — K51.80 OTHER ULCERATIVE COLITIS WITHOUT COMPLICATION: ICD-10-CM

## 2023-06-30 DIAGNOSIS — R10.13 DYSPEPSIA: ICD-10-CM

## 2023-06-30 DIAGNOSIS — R68.81 EARLY SATIETY: ICD-10-CM

## 2023-06-30 DIAGNOSIS — R11.10 REGURGITATION OF FOOD: ICD-10-CM

## 2023-06-30 PROCEDURE — 99999 PR PBB SHADOW E&M-EST. PATIENT-LVL III: ICD-10-PCS | Mod: PBBFAC,,, | Performed by: INTERNAL MEDICINE

## 2023-06-30 PROCEDURE — 3008F BODY MASS INDEX DOCD: CPT | Mod: CPTII,S$GLB,, | Performed by: INTERNAL MEDICINE

## 2023-06-30 PROCEDURE — 99999 PR PBB SHADOW E&M-EST. PATIENT-LVL III: CPT | Mod: PBBFAC,,, | Performed by: INTERNAL MEDICINE

## 2023-06-30 PROCEDURE — 1159F PR MEDICATION LIST DOCUMENTED IN MEDICAL RECORD: ICD-10-PCS | Mod: CPTII,S$GLB,, | Performed by: INTERNAL MEDICINE

## 2023-06-30 PROCEDURE — 1159F MED LIST DOCD IN RCRD: CPT | Mod: CPTII,S$GLB,, | Performed by: INTERNAL MEDICINE

## 2023-06-30 PROCEDURE — 99214 OFFICE O/P EST MOD 30 MIN: CPT | Mod: S$GLB,,, | Performed by: INTERNAL MEDICINE

## 2023-06-30 PROCEDURE — 99214 PR OFFICE/OUTPT VISIT, EST, LEVL IV, 30-39 MIN: ICD-10-PCS | Mod: S$GLB,,, | Performed by: INTERNAL MEDICINE

## 2023-06-30 PROCEDURE — 3008F PR BODY MASS INDEX (BMI) DOCUMENTED: ICD-10-PCS | Mod: CPTII,S$GLB,, | Performed by: INTERNAL MEDICINE

## 2023-06-30 RX ORDER — MESALAMINE 0.38 G/1
1.5 CAPSULE, EXTENDED RELEASE ORAL DAILY
Qty: 120 CAPSULE | Refills: 11 | Status: SHIPPED | OUTPATIENT
Start: 2023-06-30 | End: 2023-07-05 | Stop reason: SDUPTHER

## 2023-06-30 RX ORDER — DICYCLOMINE HYDROCHLORIDE 10 MG/1
10 CAPSULE ORAL
Qty: 90 CAPSULE | Refills: 2 | Status: SHIPPED | OUTPATIENT
Start: 2023-06-30 | End: 2023-10-01

## 2023-06-30 NOTE — PROGRESS NOTES
GASTROENTEROLOGY CLINIC NOTE    Reason for visit: The primary encounter diagnosis was Epigastric pain. Diagnoses of Other ulcerative colitis without complication, Dyspepsia, Early satiety, and Regurgitation of food were also pertinent to this visit.  Referring provider/PCP: Barb Dixon MD    HPI:  Sarahy Ma is a 63 y.o. female here today for chronic UC.  Previously follows with Dr. Tobias.      Interval 6/30/23  Last visit about 2 years ago.  Constant bloating, indigestion. Upper abd symptoms. Sometimes regurg when bends over.  Still taking protonix   Takes carafate occasonally, sometimes helps.  Has fluctuating and urgent bowels at times.  No blood in stool    Currently taking 2 pills of lialda daily  , was on 4 daily and reduced to 2 without any changes in symptoms.    ============================  Initial clinic visit  She is here to establish care.  Her prior GI provider is likely retiring in the near future.  She is doing fairly well with her colitis, some days are better than others.  She has not had a colonoscopy within the past 3 or 4 years.  See below for her prior history of colitis.  She believes her colitis is mainly left-sided.  There is very rare rectal bleeding at present.  She has intermittent abdominal pains that she does not take anything for.  She takes mesalamine 1.2 g daily.  One pill daily.  She initially was on 4 pills daily.  Sometimes has diarrhea and fecal urgency.  Has fecal incontinence maybe 2 -3 times a month, she is embarrased by this.   She has arthritis in hands and back. Unsure if this is inflammatory or not.    =======================================  IBD history:  Diagnosed 2016, rectal bleeding and tenesmus. abd pains initially. Had her GB out before all this. Diagnosed with Hpylori as well.   Seems to be left sided disease.     Prior therapies  lialda 4.8 for induction        Prior Endoscopy:  EGD:  Colon: about 4 years ago, becky, told left sided  colitis.    Colon  2/2021 with me  Impression:           - The examined portion of the ileum was normal.                         - The entire examined colon is normal.                         - The examination was otherwise normal on direct                         and retroflexion views.                         - Multiple biopsies were obtained in the rectum, in                         the sigmoid colon, in the descending colon, in the                         ascending colon and in the cecum.   Recommendation:                             - Await pathology results.                         - Repeat colonoscopy in 5 years for surveillance.                         - Return to my office PRN.                         - Continue low dose lialda 1 pill / day as                         currently colitis in endoscopic remission.   PATH  1.  Cecum/ascending colon, biopsy:      Colonic mucosa with no significant   histopathologic abnormality.               No evidence of  active colitis, dysplasia or malignancy.   2.  Transverse colon, biopsy:      Colonic mucosa with no significant   histopathologic abnormality.               No evidence of  active colitis, dysplasia or malignancy.   3.  Sigmoid/descending colon, biopsy:          Colonic mucosa with no   significant histopathologic abnormality.               No evidence of  active colitis, dysplasia or malignancy.   4.  Rectum, biopsy:      Colonic mucosa with no significant histopathologic   abnormality.               No evidence of  active colitis, dysplasia or malignancy.     (Portions of this note were dictated using voice recognition software and may contain dictation related errors in spelling/grammar/syntax not found on text review)    Review of Systems   Constitutional:  Negative for fever and malaise/fatigue.   Respiratory:  Negative for cough and shortness of breath.    Cardiovascular:  Negative for chest pain and palpitations.   Gastrointestinal:  Positive for  heartburn and nausea. Negative for abdominal pain, blood in stool and vomiting.   Neurological:  Negative for dizziness and headaches.     Past Medical History: has a past medical history of Hypertension, Postablative hypothyroidism, Tongue cancer, Ulcerative colitis, and Ulcerative colitis.    Past Surgical History: has a past surgical history that includes  section; Tonsillectomy; feeding tube; Portacath placement; Tubal ligation; Cholecystectomy (2016); Cholecystectomy (2016); groin surgery (2017); Colonoscopy; Hysterectomy; Oophorectomy; Colonoscopy (N/A, 2021); and Transforaminal epidural injection of steroid (Right, 2021).    Family History:family history includes Arthritis in her father and mother; Cancer in her paternal grandmother; Diabetes in her father; Early death in her brother; Heart attack in her father; Heart disease in her father; Heart failure in her father; Hyperlipidemia in her father; Hypertension in her father and mother; Thyroid disease in her brother, brother, sister, and sister.    Allergies:   Review of patient's allergies indicates:   Allergen Reactions    Amoxicillin      Other reaction(s): fever blisters    Nsaids (non-steroidal anti-inflammatory drug)      Other reaction(s): nausea, stomach issues       Social History: reports that she quit smoking about 14 years ago. Her smoking use included cigarettes. She started smoking about 42 years ago. She has a 19.00 pack-year smoking history. She has never used smokeless tobacco. She reports that she does not currently use alcohol. She reports that she does not use drugs.    Home medications:   Current Outpatient Medications on File Prior to Visit   Medication Sig Dispense Refill    amLODIPine (NORVASC) 10 MG tablet Take 1 tablet (10 mg total) by mouth once daily. 90 tablet 3    fluticasone furoate-vilanteroL (BREO ELLIPTA) 100-25 mcg/dose diskus inhaler Inhale 1 puff into the lungs once daily. Controller 180  each 2    gabapentin (NEURONTIN) 300 MG capsule Take 1 capsule (300 mg total) by mouth 3 (three) times daily. (Patient taking differently: Take 600 mg by mouth every evening.) 90 capsule 11    meclizine (ANTIVERT) 25 mg tablet Take 1 tablet (25 mg total) by mouth 3 (three) times daily as needed for Dizziness. 20 tablet 0    mesalamine (LIALDA) 1.2 gram TbEC TAKE 1 TABLET BY MOUTH TWICE A  tablet 3    ondansetron (ZOFRAN-ODT) 4 MG TbDL Take 1 tablet (4 mg total) by mouth every 8 (eight) hours as needed (nausea). 10 tablet 0    pantoprazole (PROTONIX) 40 MG tablet Take 1 tablet (40 mg total) by mouth once daily. 30 tablet 11    SYNTHROID 75 mcg tablet Take 1 tablet (75 mcg total) by mouth before breakfast. 90 tablet 3     No current facility-administered medications on file prior to visit.       Vital signs:  Wt 53.9 kg (118 lb 13.3 oz)   BMI 21.73 kg/m²     Physical Exam  Vitals reviewed.   Constitutional:       General: She is not in acute distress.  HENT:      Head: Normocephalic and atraumatic.   Eyes:      General: No scleral icterus.     Conjunctiva/sclera: Conjunctivae normal.   Skin:     General: Skin is warm.      Coloration: Skin is not pale.      Findings: No rash.   Neurological:      Mental Status: She is oriented to person, place, and time.      Gait: Gait normal.   Psychiatric:         Mood and Affect: Mood normal.         Behavior: Behavior normal.       Routine labs:  Lab Results   Component Value Date    WBC 3.96 10/26/2022    HGB 13.4 10/26/2022    HCT 39.9 10/26/2022    MCV 90 10/26/2022     10/26/2022     No results found for: INR  Lab Results   Component Value Date    IRON 39 11/14/2016    FERRITIN 141 12/28/2017    TIBC 451 (H) 11/14/2016    FESATURATED 9 (L) 11/14/2016     Lab Results   Component Value Date     10/26/2022    K 3.6 10/26/2022     10/26/2022    CO2 28 10/26/2022    BUN 15 10/26/2022    CREATININE 0.86 10/26/2022     Lab Results   Component Value Date     ALBUMIN 4.3 10/26/2022    ALT 20 10/26/2022    AST 26 10/26/2022    ALKPHOS 76 10/26/2022    BILITOT 0.6 10/26/2022     No results found for: GLUCOSE    I have reviewed prior labs, imaging, notes from last month    Reviewed recent labs, normal    Assessment:  1. Epigastric pain    2. Other ulcerative colitis without complication    3. Dyspepsia    4. Early satiety    5. Regurgitation of food      Ongoing dyspepsia and upper gi symptoms    Insurance is dictating a switch in mesalamine formulation  Will trial apriso (previously controlled very well on lialda 2 pills / day for many years)    Plan:  Orders Placed This Encounter    dicyclomine (BENTYL) 10 MG capsule    mesalamine (APRISO) 0.375 gram Cp24    Case Request Endoscopy: EGD (ESOPHAGOGASTRODUODENOSCOPY)     Apriso 2 pills daily    Protonix  Carafate    Trial bentyl for IBS prn    Schedule EGD      RTC prn      Chetan Forte MD  [facility] [place]

## 2023-06-30 NOTE — PATIENT INSTRUCTIONS
EGD Instructions    [facility] [address]  Clinic Office [phone]  Endoscopy Lab [phone]    You are scheduled for an EGD with Dr. Forte  on  7/25/23 at [facility] in [place].    Check in at the Hospital -1st floor, Information desk.   Call [phone] to reschedule.    You cannot have anything to eat or drink after Midnight. You can brush your teeth with a sip of water.     An adult friend/family member must come with you to drive you home.  You cannot drive, take a taxi, Uber/Lyft or bus to leave the Endoscopy Center alone.  If you do not have someone to drive you home, your test will be cancelled.     Please follow the directions of your doctor if you take any pills that thin your blood. If you take these meds: Aggrenox, Brilinta, Effient, Eliquis, Lovenox, Plavix, Pletal, Pradaxa, Ticilid, Xarelto or Coumadin, let the doctor's office know.    DON'T: On the morning of the test do not take insulin or pills for diabetes.     DO: On the morning of the test, do take any pills for blood pressure, heart, anti-rejection and or seizures with a small sip of water. Bring any inhalers with you.    Leave all valuables and jewelry at home. You will be at the hospital for 2-4 hours.    Call the Endoscopy department at [phone] with any questions about your procedure.    Thank you for choosing [facility].

## 2023-07-03 ENCOUNTER — PATIENT MESSAGE (OUTPATIENT)
Dept: ENDOSCOPY | Facility: HOSPITAL | Age: 63
End: 2023-07-03
Payer: COMMERCIAL

## 2023-07-03 DIAGNOSIS — K51.80 OTHER ULCERATIVE COLITIS WITHOUT COMPLICATION: ICD-10-CM

## 2023-07-05 RX ORDER — MESALAMINE 0.38 G/1
1.5 CAPSULE, EXTENDED RELEASE ORAL DAILY
Qty: 120 CAPSULE | Refills: 11 | Status: SHIPPED | OUTPATIENT
Start: 2023-07-05

## 2023-07-07 ENCOUNTER — PATIENT MESSAGE (OUTPATIENT)
Dept: ENDOSCOPY | Facility: HOSPITAL | Age: 63
End: 2023-07-07
Payer: COMMERCIAL

## 2023-07-07 ENCOUNTER — PATIENT MESSAGE (OUTPATIENT)
Dept: PAIN MEDICINE | Facility: CLINIC | Age: 63
End: 2023-07-07
Payer: COMMERCIAL

## 2023-07-07 RX ORDER — BALSALAZIDE DISODIUM 750 MG/1
2250 CAPSULE ORAL DAILY
Qty: 90 CAPSULE | Refills: 11 | Status: SHIPPED | OUTPATIENT
Start: 2023-07-07 | End: 2023-08-15

## 2023-07-10 NOTE — TELEPHONE ENCOUNTER
Staff spoke with patient regarding her request to get rescheduled for her procedure. Patient is currently busy and said that she would call back tomorrow to get rescheduled.

## 2023-07-12 ENCOUNTER — TELEPHONE (OUTPATIENT)
Dept: PAIN MEDICINE | Facility: CLINIC | Age: 63
End: 2023-07-12
Payer: COMMERCIAL

## 2023-07-12 NOTE — TELEPHONE ENCOUNTER
----- Message from Judy Santiago sent at 7/12/2023  3:05 PM CDT -----  Regarding: call back  Name of caller: Sarahy       What is the requesting detail: pt is requesting a call back to schedule an epidural. Please advise       Can the clinic reply by [facility]:       What number to call back:[phone]

## 2023-07-13 ENCOUNTER — TELEPHONE (OUTPATIENT)
Dept: UROLOGY | Facility: CLINIC | Age: 63
End: 2023-07-13
Payer: COMMERCIAL

## 2023-07-13 NOTE — TELEPHONE ENCOUNTER
Spoke to patient and results given to patient, attempted to schedule her for in clinic cysto and she stated she will call back to schedule it due to her being out of town and she does not have her schedule book with her.

## 2023-07-13 NOTE — TELEPHONE ENCOUNTER
----- Message from Familia Royal NP sent at 7/13/2023  9:57 AM CDT -----  Please inform patient via telephone that her CT urogram did not identify the source of patient's microscopic hematuria. No acute findings noted. Patient needs an in-clinic cystoscopy for further evaluation of microscopic hematuria. Please arrange. Thanks.

## 2023-07-18 ENCOUNTER — TELEPHONE (OUTPATIENT)
Dept: ADMINISTRATIVE | Facility: OTHER | Age: 63
End: 2023-07-18
Payer: COMMERCIAL

## 2023-07-18 ENCOUNTER — TELEPHONE (OUTPATIENT)
Dept: GASTROENTEROLOGY | Facility: CLINIC | Age: 63
End: 2023-07-18
Payer: COMMERCIAL

## 2023-07-18 ENCOUNTER — TELEPHONE (OUTPATIENT)
Dept: PAIN MEDICINE | Facility: CLINIC | Age: 63
End: 2023-07-18
Payer: COMMERCIAL

## 2023-07-18 NOTE — TELEPHONE ENCOUNTER
----- Message from Nirav Bell sent at 7/18/2023 12:47 PM CDT -----  Pt Requesting Call Back    Who called: pt    Who called for pt:  Best call back #: [phone]  Add notes: pt says she requests a call back to reschedule her missed procedure appt

## 2023-07-19 ENCOUNTER — PATIENT MESSAGE (OUTPATIENT)
Dept: PAIN MEDICINE | Facility: OTHER | Age: 63
End: 2023-07-19
Payer: COMMERCIAL

## 2023-07-20 ENCOUNTER — TELEPHONE (OUTPATIENT)
Dept: GASTROENTEROLOGY | Facility: CLINIC | Age: 63
End: 2023-07-20
Payer: COMMERCIAL

## 2023-07-20 NOTE — TELEPHONE ENCOUNTER
Spoke with pt. Pt states she is getting back injections and wants to wait till after that to reschedule the procedure. I cancelled her procedure for 7/25/23.

## 2023-07-20 NOTE — TELEPHONE ENCOUNTER
----- Message from Autumn Martin sent at 7/20/2023  3:08 PM CDT -----  Regarding: call back  Contact: [phone]  Who Called: PT     Patient is calling to talk to nurse in regards to rescheduling her procedure. Please advice

## 2023-07-26 ENCOUNTER — PATIENT OUTREACH (OUTPATIENT)
Dept: ADMINISTRATIVE | Facility: HOSPITAL | Age: 63
End: 2023-07-26
Payer: COMMERCIAL

## 2023-07-26 NOTE — PROGRESS NOTES
Health Maintenance Due   Topic Date Due    Pneumococcal Vaccines (Age 0-64) (1 - PCV) Never done    Shingles Vaccine (1 of 2) Never done    COVID-19 Vaccine (3 - Moderna series) 07/15/2021     Chart review done.  updated. Immunizations reviewed & updated. Care Everywhere updated.  Mammogram noted as complete 6/13/23.

## 2023-07-27 RX ORDER — PANTOPRAZOLE SODIUM 40 MG/1
TABLET, DELAYED RELEASE ORAL
Qty: 90 TABLET | Refills: 2 | Status: SHIPPED | OUTPATIENT
Start: 2023-07-27 | End: 2024-02-28

## 2023-08-14 ENCOUNTER — PATIENT MESSAGE (OUTPATIENT)
Dept: PRIMARY CARE CLINIC | Facility: CLINIC | Age: 63
End: 2023-08-14
Payer: COMMERCIAL

## 2023-08-15 ENCOUNTER — E-VISIT (OUTPATIENT)
Dept: PRIMARY CARE CLINIC | Facility: CLINIC | Age: 63
End: 2023-08-15
Payer: COMMERCIAL

## 2023-08-15 DIAGNOSIS — J06.9 UPPER RESPIRATORY TRACT INFECTION, UNSPECIFIED TYPE: Primary | ICD-10-CM

## 2023-08-15 PROCEDURE — 99421 OL DIG E/M SVC 5-10 MIN: CPT | Mod: ,,, | Performed by: INTERNAL MEDICINE

## 2023-08-15 PROCEDURE — 99421 PR E&M, ONLINE DIGIT, EST, < 7 DAYS, 5-10 MINS: ICD-10-PCS | Mod: ,,, | Performed by: INTERNAL MEDICINE

## 2023-08-15 RX ORDER — AZITHROMYCIN 250 MG/1
TABLET, FILM COATED ORAL
Qty: 6 TABLET | Refills: 0 | Status: SHIPPED | OUTPATIENT
Start: 2023-08-15 | End: 2023-08-20

## 2023-08-15 NOTE — PROGRESS NOTES
Patient ID: Sarahy Ma is a 63 y.o. female.    Chief Complaint: URI (Entered automatically based on patient selection in Patient Portal.)    The patient initiated a request through [employer] on 8/15/2023 for evaluation and management with a chief complaint of URI (Entered automatically based on patient selection in Patient Portal.)     I evaluated the questionnaire submission on 8/15/2023.    [facility] Peq Evisit Upper Respitatory/Cough Questionnaire    8/15/2023 11:00 AM CDT - Filed by Patient   Do you agree to participate in an E-Visit? Yes   If you have any of the following symptoms, please present to your local ER or call 911:  I acknowledge   What is the main issue that you would like for your doctor to address today? Sinus pressure, congestion,cough. Sore throat, headaches, not feeling well   Are you able to take your vital signs? Yes   Systolic Blood Pressure: 101   Diastolic Blood Pressure: 74   Weight: 117   Height: 62   Pulse: 76   Temperature: 97.6   Respiration rate:    Pulse Oxygen:    What symptoms do you currently have?  Cough;  Fatigue;  Headache;  Nasal Congestion;  Runny nose;  Sore throat;  Pain around the nose and face   Describe your cough: Productive (containing mucus);  Dry   Describe the mucus: Green;  Yellow;  Thick   Have you had any of the following? None of the above   Have you ever smoked? I have smoked in the past   Have you had a fever? No   When did your symptoms first appear? 8/10/2023   In the last two weeks, have you been in close contact with someone who has COVID-19 or the Flu? No   In the last two weeks, have you worked or volunteered in a healthcare facility or as a ? Healthcare facilities include a hospital, medical or dental clinic, long-term care facility, or nursing home No   Do you live in a long-term care facility, nursing home, group home, or homeless shelter? No   List what you have done or taken to help your symptoms. Mucinex, tyenol   How severe are  your symptoms? Moderate   Have your symptoms improved since they first appeared? Worse   Have you taken an at home Covid test? Yes   What were the results? Negative   Have you taken a Flu test? No   Have you been fully vaccinated for COVID? (2 Pfizer, 2 Moderna or 1 Juan Carlos & Juan Carlos vaccine injections) Yes   Have you received a booster? No   Have you recieved a Flu shot? Yes   When did you recieve your Flu shot? 2/27/2023   Do you have transportation to get tested for COVID if it is indicated and ordered for you at an [facility] location? Yes   Provide any information you feel is important to your history not asked above I do not do well on any type of steriod meds   Please attach any relevant images or files          Recent Labs Obtained:  Office Visit on 08/14/2023   Component Date Value Ref Range Status    SARS Coronavirus 2 Antigen 08/14/2023 Negative  Negative Final     Acceptable 08/14/2023 Yes   Final       Encounter Diagnosis   Name Primary?    Upper respiratory tract infection, unspecified type Yes        No orders of the defined types were placed in this encounter.     Medications Ordered This Encounter   Medications    azithromycin (Z-RICARDO) 250 MG tablet     Sig: Take 2 tablets by mouth on day 1; Take 1 tablet by mouth on days 2-5     Dispense:  6 tablet     Refill:  0        No follow-ups on file.      E-Visit Time Tracking:    Day 1 Time (in minutes): 5     Total Time (in minutes): 5

## 2023-08-21 ENCOUNTER — TELEPHONE (OUTPATIENT)
Dept: PAIN MEDICINE | Facility: CLINIC | Age: 63
End: 2023-08-21
Payer: COMMERCIAL

## 2023-08-21 NOTE — TELEPHONE ENCOUNTER
----- Message from Aviva Rangel sent at 8/21/2023  9:03 AM CDT -----  Name of Who is Calling:DENISE VERNON [[id number]]                What is the request in detail: Pt calling to reschedule her procedure on 8/22.Please call back to further assist.                 Can the clinic reply by [facility]: No                What Number to Call Back if not in [facility]:[phone]

## 2023-09-05 ENCOUNTER — LAB VISIT (OUTPATIENT)
Dept: LAB | Facility: HOSPITAL | Age: 63
End: 2023-09-05
Attending: NURSE PRACTITIONER
Payer: COMMERCIAL

## 2023-09-05 ENCOUNTER — OFFICE VISIT (OUTPATIENT)
Dept: PRIMARY CARE CLINIC | Facility: CLINIC | Age: 63
End: 2023-09-05
Payer: COMMERCIAL

## 2023-09-05 VITALS
HEART RATE: 84 BPM | WEIGHT: 117.5 LBS | OXYGEN SATURATION: 98 % | BODY MASS INDEX: 21.62 KG/M2 | DIASTOLIC BLOOD PRESSURE: 78 MMHG | HEIGHT: 62 IN | SYSTOLIC BLOOD PRESSURE: 108 MMHG

## 2023-09-05 DIAGNOSIS — I10 ESSENTIAL HYPERTENSION: ICD-10-CM

## 2023-09-05 DIAGNOSIS — R53.83 FATIGUE, UNSPECIFIED TYPE: ICD-10-CM

## 2023-09-05 DIAGNOSIS — R51.9 FACIAL PAIN: ICD-10-CM

## 2023-09-05 DIAGNOSIS — R42 DIZZY: ICD-10-CM

## 2023-09-05 DIAGNOSIS — Z85.810 HISTORY OF TONGUE CANCER: ICD-10-CM

## 2023-09-05 DIAGNOSIS — R42 DIZZY: Primary | ICD-10-CM

## 2023-09-05 LAB
ALBUMIN SERPL BCP-MCNC: 4 G/DL (ref 3.5–5.2)
ALP SERPL-CCNC: 94 U/L (ref 55–135)
ALT SERPL W/O P-5'-P-CCNC: 12 U/L (ref 10–44)
ANION GAP SERPL CALC-SCNC: 9 MMOL/L (ref 8–16)
AST SERPL-CCNC: 18 U/L (ref 10–40)
BASOPHILS # BLD AUTO: 0.02 K/UL (ref 0–0.2)
BASOPHILS NFR BLD: 0.4 % (ref 0–1.9)
BILIRUB SERPL-MCNC: 0.5 MG/DL (ref 0.1–1)
BUN SERPL-MCNC: 15 MG/DL (ref 8–23)
CALCIUM SERPL-MCNC: 9.1 MG/DL (ref 8.7–10.5)
CHLORIDE SERPL-SCNC: 105 MMOL/L (ref 95–110)
CO2 SERPL-SCNC: 24 MMOL/L (ref 23–29)
CREAT SERPL-MCNC: 0.9 MG/DL (ref 0.5–1.4)
DIFFERENTIAL METHOD: NORMAL
EOSINOPHIL # BLD AUTO: 0.2 K/UL (ref 0–0.5)
EOSINOPHIL NFR BLD: 3 % (ref 0–8)
ERYTHROCYTE [DISTWIDTH] IN BLOOD BY AUTOMATED COUNT: 12.4 % (ref 11.5–14.5)
EST. GFR  (NO RACE VARIABLE): >60 ML/MIN/1.73 M^2
GLUCOSE SERPL-MCNC: 86 MG/DL (ref 70–110)
HCT VFR BLD AUTO: 41.7 % (ref 37–48.5)
HGB BLD-MCNC: 13.5 G/DL (ref 12–16)
IMM GRANULOCYTES # BLD AUTO: 0.01 K/UL (ref 0–0.04)
IMM GRANULOCYTES NFR BLD AUTO: 0.2 % (ref 0–0.5)
LYMPHOCYTES # BLD AUTO: 1.5 K/UL (ref 1–4.8)
LYMPHOCYTES NFR BLD: 26.2 % (ref 18–48)
MCH RBC QN AUTO: 29.2 PG (ref 27–31)
MCHC RBC AUTO-ENTMCNC: 32.4 G/DL (ref 32–36)
MCV RBC AUTO: 90 FL (ref 82–98)
MONOCYTES # BLD AUTO: 0.7 K/UL (ref 0.3–1)
MONOCYTES NFR BLD: 12.3 % (ref 4–15)
NEUTROPHILS # BLD AUTO: 3.3 K/UL (ref 1.8–7.7)
NEUTROPHILS NFR BLD: 57.9 % (ref 38–73)
NRBC BLD-RTO: 0 /100 WBC
PLATELET # BLD AUTO: 203 K/UL (ref 150–450)
PMV BLD AUTO: 9.6 FL (ref 9.2–12.9)
POTASSIUM SERPL-SCNC: 4.5 MMOL/L (ref 3.5–5.1)
PROT SERPL-MCNC: 7.1 G/DL (ref 6–8.4)
RBC # BLD AUTO: 4.62 M/UL (ref 4–5.4)
SODIUM SERPL-SCNC: 138 MMOL/L (ref 136–145)
WBC # BLD AUTO: 5.68 K/UL (ref 3.9–12.7)

## 2023-09-05 PROCEDURE — 99999 PR PBB SHADOW E&M-EST. PATIENT-LVL IV: ICD-10-PCS | Mod: PBBFAC,,, | Performed by: NURSE PRACTITIONER

## 2023-09-05 PROCEDURE — 80053 COMPREHEN METABOLIC PANEL: CPT | Performed by: NURSE PRACTITIONER

## 2023-09-05 PROCEDURE — 3008F PR BODY MASS INDEX (BMI) DOCUMENTED: ICD-10-PCS | Mod: CPTII,S$GLB,, | Performed by: NURSE PRACTITIONER

## 2023-09-05 PROCEDURE — 1159F PR MEDICATION LIST DOCUMENTED IN MEDICAL RECORD: ICD-10-PCS | Mod: CPTII,S$GLB,, | Performed by: NURSE PRACTITIONER

## 2023-09-05 PROCEDURE — 99999 PR PBB SHADOW E&M-EST. PATIENT-LVL IV: CPT | Mod: PBBFAC,,, | Performed by: NURSE PRACTITIONER

## 2023-09-05 PROCEDURE — 3074F PR MOST RECENT SYSTOLIC BLOOD PRESSURE < 130 MM HG: ICD-10-PCS | Mod: CPTII,S$GLB,, | Performed by: NURSE PRACTITIONER

## 2023-09-05 PROCEDURE — 1159F MED LIST DOCD IN RCRD: CPT | Mod: CPTII,S$GLB,, | Performed by: NURSE PRACTITIONER

## 2023-09-05 PROCEDURE — 1160F RVW MEDS BY RX/DR IN RCRD: CPT | Mod: CPTII,S$GLB,, | Performed by: NURSE PRACTITIONER

## 2023-09-05 PROCEDURE — 99214 PR OFFICE/OUTPT VISIT, EST, LEVL IV, 30-39 MIN: ICD-10-PCS | Mod: S$GLB,,, | Performed by: NURSE PRACTITIONER

## 2023-09-05 PROCEDURE — 3008F BODY MASS INDEX DOCD: CPT | Mod: CPTII,S$GLB,, | Performed by: NURSE PRACTITIONER

## 2023-09-05 PROCEDURE — 85025 COMPLETE CBC W/AUTO DIFF WBC: CPT | Performed by: NURSE PRACTITIONER

## 2023-09-05 PROCEDURE — 3074F SYST BP LT 130 MM HG: CPT | Mod: CPTII,S$GLB,, | Performed by: NURSE PRACTITIONER

## 2023-09-05 PROCEDURE — 36415 COLL VENOUS BLD VENIPUNCTURE: CPT | Performed by: NURSE PRACTITIONER

## 2023-09-05 PROCEDURE — 99214 OFFICE O/P EST MOD 30 MIN: CPT | Mod: S$GLB,,, | Performed by: NURSE PRACTITIONER

## 2023-09-05 PROCEDURE — 3078F PR MOST RECENT DIASTOLIC BLOOD PRESSURE < 80 MM HG: ICD-10-PCS | Mod: CPTII,S$GLB,, | Performed by: NURSE PRACTITIONER

## 2023-09-05 PROCEDURE — 3078F DIAST BP <80 MM HG: CPT | Mod: CPTII,S$GLB,, | Performed by: NURSE PRACTITIONER

## 2023-09-05 PROCEDURE — 1160F PR REVIEW ALL MEDS BY PRESCRIBER/CLIN PHARMACIST DOCUMENTED: ICD-10-PCS | Mod: CPTII,S$GLB,, | Performed by: NURSE PRACTITIONER

## 2023-09-05 NOTE — PROGRESS NOTES
[facility] Note    Chief Complaint      Chief Complaint   Patient presents with    brain fog     X 1 month     Fatigue       History of Present Illness      Sarahy Ma is a 63 y.o. female with chronic conditions of hypothyroidism, ulcerative colitis, history of tongue cancer, lumbar degenerative disc disease, and HTN who presents today for: feels off balance X couple of weeks. If closes eyes, feels great. Tried meclizine didn't help. Does feel dizzy/lightheaded when rolls out of bed in the AM.Tried Mucinex and flonase. Having some frontal sinus pressure. Feels very tired. Was sick 3 weeks ago, given Zpack.  Going to make an appointment with the eye doctor.   Hx of tongue cancer: needs new ENT due to insurance; will play referral.   HTN: decreased to 5 mg Amlodipine on 23, BP controlled today.on a new program with the school for monitoring, will send us new readings if low will discontinue Amlodipine.     MMSE 30    Past Medical History:  Past Medical History:   Diagnosis Date    Hypertension     Postablative hypothyroidism     secondary to the radiation treatment for tongue cancer - treated by oncologist    Tongue cancer     Ulcerative colitis     Treated by Dr. Tobias    Ulcerative colitis        Past Surgical History:   has a past surgical history that includes  section; Tonsillectomy; feeding tube; Portacath placement; Tubal ligation; Cholecystectomy (2016); Cholecystectomy (2016); groin surgery (2017); Colonoscopy; Hysterectomy; Oophorectomy; Colonoscopy (N/A, 2021); and Transforaminal epidural injection of steroid (Right, 2021).    Family History:  family history includes Arthritis in her father and mother; Cancer in her paternal grandmother; Diabetes in her father; Early death in her brother; Heart attack in her father; Heart disease in her father; Heart failure in her father; Hyperlipidemia in her father; Hypertension in her father and  mother; Thyroid disease in her brother, brother, sister, and sister.     Social History:  Social History     Tobacco Use    Smoking status: Former     Current packs/day: 0.00     Average packs/day: 1 pack/day for 28.7 years (28.7 ttl pk-yrs)     Types: Cigarettes     Start date: 1980     Quit date: 5/15/2009     Years since quittin.3     Passive exposure: Never    Smokeless tobacco: Never   Substance Use Topics    Alcohol use: Not Currently     Comment: occasionally    Drug use: No       Review of Systems   Constitutional:  Negative for chills and fever.   Respiratory:  Negative for cough and shortness of breath.    Cardiovascular:  Negative for chest pain and palpitations.   Gastrointestinal:  Negative for constipation, diarrhea, nausea and vomiting.   Genitourinary:  Negative for dysuria and hematuria.   Musculoskeletal:  Negative for falls.   Neurological:  Negative for headaches.        Medications:  Outpatient Encounter Medications as of 2023   Medication Sig Dispense Refill    amLODIPine (NORVASC) 10 MG tablet Take 1 tablet (10 mg total) by mouth once daily. (Patient taking differently: Take 5 mg by mouth once daily.) 90 tablet 3    dicyclomine (BENTYL) 10 MG capsule Take 1 capsule (10 mg total) by mouth before meals as needed (abdominal upset). 90 capsule 2    fluticasone furoate-vilanteroL (BREO ELLIPTA) 100-25 mcg/dose diskus inhaler Inhale 1 puff into the lungs once daily. Controller 180 each 2    gabapentin (NEURONTIN) 300 MG capsule Take 1 capsule (300 mg total) by mouth 3 (three) times daily. (Patient taking differently: Take 600 mg by mouth every evening.) 90 capsule 11    mesalamine (APRISO) 0.375 gram Cp24 Take 4 capsules (1.5 g total) by mouth once daily. Can taper to 2 capsules per day if symptoms controlled 120 capsule 11    ondansetron (ZOFRAN-ODT) 4 MG TbDL Take 1 tablet (4 mg total) by mouth every 8 (eight) hours as needed (nausea). 10 tablet 0    pantoprazole (PROTONIX) 40 MG  "tablet TAKE 1 TABLET BY MOUTH EVERY DAY 90 tablet 2    SYNTHROID 75 mcg tablet Take 1 tablet (75 mcg total) by mouth before breakfast. 90 tablet 3    meclizine (ANTIVERT) 25 mg tablet Take 1 tablet (25 mg total) by mouth 3 (three) times daily as needed for Dizziness. (Patient not taking: Reported on 9/5/2023) 20 tablet 0     No facility-administered encounter medications on file as of 9/5/2023.       Allergies:  Review of patient's allergies indicates:   Allergen Reactions    Amoxicillin      Other reaction(s): fever blisters    Nsaids (non-steroidal anti-inflammatory drug)      Other reaction(s): nausea, stomach issues       Health Maintenance:  Immunization History   Administered Date(s) Administered    COVID-19, MRNA, LN-S, PF (MODERNA FULL 0.5 ML DOSE) 04/22/2021, 05/20/2021    Influenza - Quadrivalent - PF *Preferred* (6 months and older) 09/24/2019, 09/17/2020, 10/28/2021, 11/07/2022    Tdap 06/02/2017      Health Maintenance   Topic Date Due    Shingles Vaccine (1 of 2) Never done    LDCT Lung Screen  05/27/2022    Mammogram  06/13/2024    Colorectal Cancer Screening  02/19/2026    TETANUS VACCINE  06/02/2027    Lipid Panel  10/26/2027    Hepatitis C Screening  Completed        Physical Exam      Vital Signs  Pulse: 84  SpO2: 98 %  BP: 108/78  BP Location: Left arm  Patient Position: Sitting  Height and Weight  Height: 5' 2" (157.5 cm)  Weight: 53.3 kg (117 lb 8.1 oz)  BSA (Calculated - sq m): 1.53 sq meters  BMI (Calculated): 21.5  Weight in (lb) to have BMI = 25: 136.4]    Physical Exam  Constitutional:       Appearance: She is well-developed.   HENT:      Head: Normocephalic and atraumatic.      Ears:      Comments: Bilateral TM with clear effusion      Mouth/Throat:      Mouth: Mucous membranes are moist.      Pharynx: No oropharyngeal exudate or posterior oropharyngeal erythema.   Eyes:      Pupils: Pupils are equal, round, and reactive to light.      Comments: Negative for nystagmus.     Cardiovascular: "      Rate and Rhythm: Normal rate and regular rhythm.      Heart sounds: Normal heart sounds. No murmur heard.  Pulmonary:      Effort: Pulmonary effort is normal. No respiratory distress.      Breath sounds: Normal breath sounds.   Abdominal:      General: There is no distension.      Palpations: Abdomen is soft.      Tenderness: There is no abdominal tenderness. There is no guarding.   Musculoskeletal:         General: Normal range of motion.   Skin:     General: Skin is warm and dry.   Neurological:      General: No focal deficit present.      Mental Status: She is alert and oriented to person, place, and time. Mental status is at baseline.      Cranial Nerves: No cranial nerve deficit.   Psychiatric:         Behavior: Behavior normal.          Laboratory:  CBC:  Recent Labs   Lab 07/19/21  0854 10/26/21  0733 10/26/22  0719   WBC 6.56 5.55 3.96   RBC 4.46 4.57 4.42   Hemoglobin 13.4 13.9 13.4   Hematocrit 40.4 40.8 39.9   Platelets 276 209 205   MCV 91 89 90   MCH 30.0 30.4 30.3   MCHC 33.2 34.1 33.6     CMP:  Recent Labs   Lab 11/09/20  0755 07/19/21  0854 10/26/21  0733 10/26/22  0719   Glucose 94   < > 96 98   Calcium 9.6   < > 9.8 8.9   Albumin 4.2  --  4.6 4.3   Total Protein 6.9  --  7.4 7.2   Sodium 141   < > 141 141   Potassium 3.8   < > 4.1 3.6   CO2 29   < > 29 28   Chloride 106   < > 104 104   BUN 14   < > 18 H 15   Alkaline Phosphatase 75  --  86 76   ALT 15  --  16 20   AST 22  --  24 26   Total Bilirubin 0.7  --  0.6 0.6    < > = values in this interval not displayed.     URINALYSIS:  Recent Labs   Lab 01/29/21  1407 10/26/22  0718 06/07/23  1719   Color, UA Yellow   < > Yellow   Specific Gravity, UA >=1.030 A   < > <=1.005 A   pH, UA 6.0   < > 6.0   Protein, UA 1+ A   < > Negative   Bacteria Few A   < > Rare   Nitrite, UA Negative   < > Negative   Leukocytes, UA 1+ A   < > Negative   Urobilinogen, UA Negative   < > Negative   Hyaline Casts, UA 0  --   --     < > = values in this interval not  displayed.      LIPIDS:  Recent Labs   Lab 11/09/20  0755 05/27/21  0927 10/26/21  0733 04/19/22  0812 10/26/22  0719 05/03/23  1034   TSH 0.578   < > 3.050 2.810 2.760 2.690   HDL 74  --  79 H  --  77 H  --    Cholesterol 183  --  204 H  --  217 H  --    Triglycerides 70  --  81  --  86  --    LDL Cholesterol 95.0  --  108.8  --  122.8  --    HDL/Cholesterol Ratio 40.4  --  38.7  --  35.5  --    Non-HDL Cholesterol 109  --  125  --  140  --    Total Cholesterol/HDL Ratio 2.5  --  2.6  --  2.8  --     < > = values in this interval not displayed.     TSH:  Recent Labs   Lab 04/19/22  0812 10/26/22  0719 05/03/23  1034   TSH 2.810 2.760 2.690     A1C:  Recent Labs   Lab 11/09/20  0755   Hemoglobin A1C 5.2       Assessment/Plan     Sarahy Ma is a 63 y.o.female with:    Dizzy  Will send to ENT for further eval. Will order CT imaging if no improvement prior to appointment     Fatigue  Deferred thyroid testing at this time. TSH in May WNL. Will check CBC, CMP.     Essential Hypertension  Bp at goal. Will send in 1 week of BP readings to ensure not low. Continue current meds.      Facial pain  Likely sinus. Had CT in 2022 with history below. Offered repeat, and ENT consult.   Continue flonase and nasal rinse.   Does not tolerate oral steroids well.     History of Oral cancer       Chronic conditions status updated as per HPI.  Other than changes above, cont current medications and maintain follow up with specialists.  Follow up if symptoms worsen or fail to improve.    Future Appointments   Date Time Provider Department Center   9/6/2023 11:00 AM Blake Tejeda PA-C [facility] [place]   11/27/2023  7:30 AM [facility]   11/29/2023 11:15 AM Barb Dixon MD [facility] [place]       Adrienne Cotaya, FNP [facility]

## 2023-09-06 ENCOUNTER — OFFICE VISIT (OUTPATIENT)
Dept: OTOLARYNGOLOGY | Facility: CLINIC | Age: 63
End: 2023-09-06
Payer: COMMERCIAL

## 2023-09-06 VITALS
BODY MASS INDEX: 21.57 KG/M2 | HEART RATE: 76 BPM | DIASTOLIC BLOOD PRESSURE: 71 MMHG | SYSTOLIC BLOOD PRESSURE: 103 MMHG | WEIGHT: 117.94 LBS

## 2023-09-06 DIAGNOSIS — K21.9 LARYNGOPHARYNGEAL REFLUX (LPR): ICD-10-CM

## 2023-09-06 DIAGNOSIS — J31.0 CHRONIC RHINITIS: ICD-10-CM

## 2023-09-06 DIAGNOSIS — J34.3 HYPERTROPHY OF INFERIOR NASAL TURBINATE: ICD-10-CM

## 2023-09-06 DIAGNOSIS — Z85.810 HISTORY OF TONGUE CANCER: ICD-10-CM

## 2023-09-06 DIAGNOSIS — R13.10 DYSPHAGIA, UNSPECIFIED TYPE: ICD-10-CM

## 2023-09-06 DIAGNOSIS — K11.7 XEROSTOMIA DUE TO RADIOTHERAPY: ICD-10-CM

## 2023-09-06 DIAGNOSIS — R26.89 BALANCE DISORDER: Primary | ICD-10-CM

## 2023-09-06 DIAGNOSIS — G44.86 CERVICOGENIC HEADACHE: ICD-10-CM
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
## 2023-09-06 PROCEDURE — 99214 OFFICE O/P EST MOD 30 MIN: CPT | Mod: 25,S$GLB,, | Performed by: PHYSICIAN ASSISTANT

## 2023-09-06 PROCEDURE — 99214 PR OFFICE/OUTPT VISIT, EST, LEVL IV, 30-39 MIN: ICD-10-PCS | Mod: 25,S$GLB,, | Performed by: PHYSICIAN ASSISTANT

## 2023-09-06 PROCEDURE — 99999 PR PBB SHADOW E&M-EST. PATIENT-LVL IV: CPT | Mod: PBBFAC,,, | Performed by: PHYSICIAN ASSISTANT

## 2023-09-06 PROCEDURE — 31575 LARYNGOSCOPY: ICD-10-PCS | Mod: S$GLB,,, | Performed by: PHYSICIAN ASSISTANT

## 2023-09-06 PROCEDURE — 1159F PR MEDICATION LIST DOCUMENTED IN MEDICAL RECORD: ICD-10-PCS | Mod: CPTII,S$GLB,, | Performed by: PHYSICIAN ASSISTANT

## 2023-09-06 PROCEDURE — 1159F MED LIST DOCD IN RCRD: CPT | Mod: CPTII,S$GLB,, | Performed by: PHYSICIAN ASSISTANT

## 2023-09-06 PROCEDURE — 3078F DIAST BP <80 MM HG: CPT | Mod: CPTII,S$GLB,, | Performed by: PHYSICIAN ASSISTANT

## 2023-09-06 PROCEDURE — 3008F PR BODY MASS INDEX (BMI) DOCUMENTED: ICD-10-PCS | Mod: CPTII,S$GLB,, | Performed by: PHYSICIAN ASSISTANT

## 2023-09-06 PROCEDURE — 3078F PR MOST RECENT DIASTOLIC BLOOD PRESSURE < 80 MM HG: ICD-10-PCS | Mod: CPTII,S$GLB,, | Performed by: PHYSICIAN ASSISTANT

## 2023-09-06 PROCEDURE — 99999 PR PBB SHADOW E&M-EST. PATIENT-LVL IV: ICD-10-PCS | Mod: PBBFAC,,, | Performed by: PHYSICIAN ASSISTANT

## 2023-09-06 PROCEDURE — 3074F SYST BP LT 130 MM HG: CPT | Mod: CPTII,S$GLB,, | Performed by: PHYSICIAN ASSISTANT

## 2023-09-06 PROCEDURE — 3008F BODY MASS INDEX DOCD: CPT | Mod: CPTII,S$GLB,, | Performed by: PHYSICIAN ASSISTANT

## 2023-09-06 PROCEDURE — 3074F PR MOST RECENT SYSTOLIC BLOOD PRESSURE < 130 MM HG: ICD-10-PCS | Mod: CPTII,S$GLB,, | Performed by: PHYSICIAN ASSISTANT

## 2023-09-06 PROCEDURE — 31575 DIAGNOSTIC LARYNGOSCOPY: CPT | Mod: S$GLB,,, | Performed by: PHYSICIAN ASSISTANT

## 2023-09-06 RX ORDER — FLUTICASONE PROPIONATE 50 MCG
2 SPRAY, SUSPENSION (ML) NASAL DAILY
Qty: 16 G | Refills: 2 | Status: SHIPPED | OUTPATIENT
Start: 2023-09-06 | End: 2023-12-17

## 2023-09-06 NOTE — PROGRESS NOTES
Subjective:     HPI: Sarahy Ma is a 63 y.o. female with h/o tongue cancer s/p radiation+chemo, UC, GERD (on PPI) who was referred to me by Nancy Contreras in consultation for  off balance .    Patient reports recurrent symptoms of off-balance or seesaw symptoms daily for the last 6 weeks with associated occipital headache.  Patient has reported similar episodes in the past following radiation and chemo for tongue cancer, but now symptoms are more frequent.  Patient denies any room spinning dizziness or vertigo.  Patient reports this is still different than prior episodes of vertigo.  Patient reports symptoms usually worse with moving her eyes in certain ways an with standing quickly.  Lasts all day.  Patient denies any ear pain, ear pressure, snoring, nasal obstruction, sneezing, rhinorrhea, or nasal obstruction.  Patient does report chronic postnasal drip for years which waxes and wanes.    Current sinonasal medications include none at present.  The last course of antibiotics was a long time ago.    She does not recall previously having allergy testing.  She denies a history of asthma.  She relates a history of reflux symptoms which is currently managed with pantoprazole.    She denies a diagnosis of obstructive sleep apnea.   She does not recall a prior history of nasal trauma.  She has not had sinonasal surgery.    She has had a tonsillectomy.  She is not a tobacco smoker.     Past Medical/Past Surgical History  Past Medical History:   Diagnosis Date    Hypertension     Postablative hypothyroidism     secondary to the radiation treatment for tongue cancer - treated by oncologist    Tongue cancer     Ulcerative colitis     Treated by Dr. Tobias    Ulcerative colitis      She has a past surgical history that includes  section; Tonsillectomy; feeding tube; Portacath placement; Tubal ligation; Cholecystectomy (2016); Cholecystectomy (2016); groin surgery (2017); Colonoscopy;  Hysterectomy; Oophorectomy; Colonoscopy (N/A, 02/19/2021); and Transforaminal epidural injection of steroid (Right, 12/21/2021).    Family History/Social History  Her family history includes Arthritis in her father and mother; Cancer in her paternal grandmother; Diabetes in her father; Early death in her brother; Heart attack in her father; Heart disease in her father; Heart failure in her father; Hyperlipidemia in her father; Hypertension in her father and mother; Thyroid disease in her brother, brother, sister, and sister.  She reports that she quit smoking about 14 years ago. Her smoking use included cigarettes. She started smoking about 43 years ago. She has a 28.7 pack-year smoking history. She has never been exposed to tobacco smoke. She has never used smokeless tobacco. She reports that she does not currently use alcohol. She reports that she does not use drugs.    Allergies/Immunizations  She is allergic to amoxicillin and nsaids (non-steroidal anti-inflammatory drug).  Immunization History   Administered Date(s) Administered    COVID-19, MRNA, LN-S, PF (MODERNA FULL 0.5 ML DOSE) 04/22/2021, 05/20/2021    Influenza - Quadrivalent - PF *Preferred* (6 months and older) 09/24/2019, 09/17/2020, 10/28/2021, 11/07/2022    Tdap 06/02/2017        Medications   amLODIPine  dicyclomine  fluticasone furoate-vilanteroL  gabapentin  meclizine  mesalamine Cp24  ondansetron Tbdl  pantoprazole  SYNTHROID Tab     Review of Systems     Constitutional: Positive for fatigue.      HENT: Positive for postnasal drip, sinus infection, sinus pressure and trouble swallowing.      Eyes:  Negative for change in eyesight, eye drainage, eye itching and photophobia.     Respiratory:  Negative for cough, shortness of breath, sleep apnea, snoring and wheezing.      Cardiovascular:  Negative for chest pain, foot swelling, irregular heartbeat and swollen veins.     Gastrointestinal:  Positive for acid reflux.     Genitourinary: Negative for  difficulty urinating, sexual problems and frequent urination.     Musc: Positive for back pain and neck pain.     Skin: Negative for rash.     Allergy: Negative for food allergies and seasonal allergies.     Endocrine: Positive for cold intolerance.     Neurological: Positive for headaches and light-headedness.     Hematologic: Positive for bruises/bleeds easily.     Psychiatric: Positive for decreased concentration.         Objective:     /71 (BP Location: Right arm, Patient Position: Sitting)   Pulse 76   Wt 53.5 kg (117 lb 15.1 oz)   BMI 21.57 kg/m²        Constitutional:   She appears well-developed and well-nourished. Normal speech.      Head:  Normocephalic and atraumatic. Facial strength is normal.      Ears:    Right Ear: No drainage or swelling. Tympanic membrane is scarred. Tympanic membrane is not perforated and not erythematous. No middle ear effusion.   Left Ear: No drainage or swelling. Tympanic membrane is scarred. Tympanic membrane is not perforated and not erythematous.  No middle ear effusion.     Nose:  Mucosal edema and rhinorrhea present. No septal deviation or polyps.  No foreign bodies. Turbinate hypertrophy.  Turbinates normal and no turbinate masses.  Right sinus exhibits maxillary sinus tenderness. Right sinus exhibits no frontal sinus tenderness. Left sinus exhibits maxillary sinus tenderness. Left sinus exhibits no frontal sinus tenderness.     Mouth/Throat  Oropharynx clear and moist without lesions or asymmetry and normal uvula midline. No trismus or mucous membrane lesions. No oropharyngeal exudate, posterior oropharyngeal edema or posterior oropharyngeal erythema. Tonsils not present.      Neck:  Phonation normal. Thyroid tenderness is present. No thyroid mass and no thyromegaly present.     She has no cervical adenopathy.     Pulmonary/Chest:   Effort normal.     Psychiatric:   She has a normal mood and affect. Her speech is normal and behavior is normal.  "      Procedure    Flexible laryngoscopy performed.  See procedure note.     L NV     L MT     R NV     R MT      Data Reviewed  I personally reviewed the chart, including any outside records, and pertinent data below:    I reviewed the following notes: Urgent Care, Primary care    WBC (K/uL)   Date Value   09/05/2023 5.68     Eosinophil % (%)   Date Value   09/05/2023 3.0     Eos # (K/uL)   Date Value   09/05/2023 0.2     Platelets (K/uL)   Date Value   09/05/2023 203     Glucose (mg/dL)   Date Value   09/05/2023 86     No results found for: "IGE"    I independently reviewed the images of the CT sinuses dated 4/29/22. Pertinent findings include overall patent sinuses.    Assessment & Plan:     1. Balance disorder  -     Laryngoscopy without evidence of sinus infection, polyps, or masses  - Ear exam WNL  - Do not suspect inner ear pathology  - Advised eval by ophthalmologist  - Cherelle's exercises provided  - Will have patient evaluated by otologist and undergo VNG if symptoms persist    2. Chronic rhinitis  3. Hypertrophy of inferior nasal turbinate  Prescribed patient to START fluticasone propionate & azelastine and also educated patient on how to properly use nasal sprays    4. History of tongue cancer  Overview:  Diagnosed/treated at [facility] in 2009.  Reportedly Stage 3 at time of diagnosis => treated with XRT/chemotherapy.    5. Laryngopharyngeal reflux (LPR)   - discussed LPR and cutting back on coffee  6. Cervicogenic headache   - could be contributing to sxs of facial pressure and headache  7. 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 Dysphagia, unspecified type   - side effects of radiation   - will continue to monitor    She will Follow up in about 6 weeks (around 10/18/2023) for re-evaluate post treatment.  I had a discussion with the patient regarding her condition and the further workup and management options.    All questions were answered, and the patient is in agreement with the above. "     Disclaimer:  This note may have been prepared utilizing voice recognition software which may result in occasional typographical errors in the text such as sound alike words.   If further clarification is needed, please contact the ENT department of [facility].

## 2023-09-06 NOTE — PROCEDURES
"Laryngoscopy    Date/Time: 9/6/2023 11:00 AM    Performed by: Blake Tejeda PA-C  Authorized by: Blake Tejeda PA-C    Time out: Immediately prior to procedure a "time out" was called to verify the correct patient, procedure, equipment, support staff and site/side marked as required.    Consent Done?:  Yes (Verbal)  Anesthesia:     Local anesthetic:  Lidocaine 4% and Fred-Synephrine 1/2%    Patient tolerance:  Patient tolerated the procedure well with no immediate complications  Laryngoscopy:     Areas examined:  Nasal cavities, nasopharynx, oropharynx, hypopharynx, larynx and vocal cords    Laryngoscope size:  4 mm  Nose Intranasal:      Mucosa no polyps     Mucosa ulcers not present     No mucosa lesions present     No septum gross deformity     Enlarged turbinates  Nasopharynx:      No mucosa lesions     Adenoids present     Posterior choanae patent     Eustachian tube patent  Larynx/hypopharynx:      No epiglottis lesions     No epiglottis edema     No AE folds lesions     No vocal cord polyps     Equal and normal bilateral     No hypopharynx lesions     No piriform sinus pooling     No piriform sinus lesions     Post cricoid edema     No post cricoid erythema     VF appear edematous  Clear PND from posterior R IT    "

## 2023-09-06 NOTE — PATIENT INSTRUCTIONS
"  Nasal Steroid Spray (Flonase)  You have been prescribed or instructed to take a nasal steroid spray (Flonase). Some symptoms will experience relief within a few days; however, it may take 2-3 weeks to begin to see improvement. This medication needs to be taken consistently to see results.    Use as directed and please be aware the Flonase takes 7-10 days of consistent use before becoming effective- so try to be patient :)    Helpful hints for maximizing nasal spray medication into the nose  - Use the opposite hand to spray the nostril (example: right hand for left nostril). This will help avoid spraying the medication onto the septum (the area that divides the left and right nasal cavity.)  - Tilt the bottle so that it is facing at a slight angle up or straight back, but avoid pointing the bottle straight up while spraying.   - Gently sniff (do not snort) a few seconds after spraying.     LARYNGOPHARYNGEAL REFLUX  (SILENT OR ATYPICAL REFLUX)    LPR is a very challenging disease as it includes a vast range of symptoms and difficult to diagnose.      SYMPTOMS  If you have any of the following symptoms you MAY have laryngopharyngeal reflux (LPR):    1. Hoarseness  2. Sore throat  4. throat clearing, sometimes clearing thick mucous  5. chronic cough, especially cough that wakes you up from sleep  6.  "postnasal drip" without the need to blow your nose  7. Globus sensation, like the sensation of something being stuck in the throat  8.  Red, swollen or irritated larynx (voice box)  Many people with LPR do not have symptoms of heartburn. Compared to the esophagus, the voice box and the back of the throat are significantly more sensitive to the effects of acid and digestive enzymes on surrounding tissue. Acid passing quickly through the esophagus does not have a chance to irritate the area for too long.  However acid that pools in the throat or voice box can cause prolonged irritation resulting in the symptoms of " LPR.    HOW  There are muscles (esophageal sphincters) at both ends of the tube that carries food to your stomach (the esophagus). These muscles relax to let food pass. Then they tighten to keep stomach acid down. When the lower esophageal sphincter (LES) doesn't tighten enough, acid can flow back (reflux) from your stomach into your esophagus. This may cause heartburn. In some cases the upper esophageal sphincter (UES) also doesn't work well. Then acid can travel higher and enter your throat (pharynx). In many cases, this causes throat symptoms.    DIAGNOSIS  A common procedure performed by an ENT is called a direct Laryngoscope.  A thin tube is inserted through the nose in order to visualize the larynx (the voice box). This method can detect abnormalities in the voice box ranging from polyps to laryngeal cancer.  This can also reveal signs of LPR, but is not 100% reliable.      If symptoms persist despite medical treatment listed below, further testing is needed.  Three commonly used tests are: a swallowing study; a direct look at the stomach and esophagus through an endoscope, and; an esophageal pH test.  Further testing is usually coordinated with a gastroenterologist.     TREATMENT  Lifestyle changes  1. Do not smoke.  Smoking will worsen reflux.    2. Avoid eating at least 2-3 hours prior to bedtime.  Try to avoid very large meals at night.    4. Weight loss.  For patient's with recent weight gain, shedding a few pounds is all that is required to improve reflux.    5. Avoid reflux triggers. These can worsen acid in the stomach or even cause the esophagus muscles to relax: caffeine, soft drinks, acidic foods (tomato, lots of fruit) mints, alcoholic beverages, particularly at night, cheese, fried foods, spicy foods, eggs, and chocolate.    6. Sleep with the head of bed elevated at least 6 inches.    Medications  Take over-the-counter (OTC) medications, including antacids, such as Gaviscon Advance (others include  Tums®, Maalox®, or Mylanta)  Prescription and OTC stomach acid reducers, such as ranitidine (Tagamet® or Zantac®) OR proton pump inhibitors (PPIs), such as omeprazole (Prilosec®), pantoprazole (Protonix®), and esomeprazole (Nexium®).  Most patients will begin to notice some relief in their symptoms about 2-4 weeks after starting an acid reducing medication; however it is generally recommended the medication should be continued for at least 2 months. If the symptoms completely resolve, the medication can then be tapered.  Some people will remain symptom free while others may have relapses which required treatment again.      Other Treatments:  All natural remedies include Reflux Gourmet (and Reflux Raft) which create a temporary protective barrier in your stomach to prevent reflux into your esophagus. This can be an effective therapy without the side effects of PPIs and was also developed by laryngologists (LPR specialists).   Speech Therapy: education and techniques aimed at helping you control the cough

## 2023-09-11 ENCOUNTER — PATIENT MESSAGE (OUTPATIENT)
Dept: PRIMARY CARE CLINIC | Facility: CLINIC | Age: 63
End: 2023-09-11
Payer: COMMERCIAL

## 2023-09-11 DIAGNOSIS — Z85.810 HISTORY OF TONGUE CANCER: Primary | ICD-10-CM

## 2023-09-17 DIAGNOSIS — R06.02 SHORTNESS OF BREATH: ICD-10-CM

## 2023-09-17 NOTE — TELEPHONE ENCOUNTER
No care due was identified.  [facility] Embedded Care Due Messages. Reference number: [id number].   9/17/2023 10:45:03 AM CDT

## 2023-09-18 ENCOUNTER — CLINICAL SUPPORT (OUTPATIENT)
Dept: OTHER | Facility: CLINIC | Age: 63
End: 2023-09-18
Payer: COMMERCIAL

## 2023-09-18 DIAGNOSIS — Z00.8 ENCOUNTER FOR OTHER GENERAL EXAMINATION: ICD-10-CM

## 2023-09-18 RX ORDER — BUDESONIDE AND FORMOTEROL FUMARATE DIHYDRATE 80; 4.5 UG/1; UG/1
AEROSOL RESPIRATORY (INHALATION)
Refills: 0 | OUTPATIENT
Start: 2023-09-18

## 2023-09-18 NOTE — TELEPHONE ENCOUNTER
Refill Decision Note   Sarahy Ma  is requesting a refill authorization.  Brief Assessment and Rationale for Refill:  Quick Discontinue     Medication Therapy Plan:    Pharmacy is requesting new scripts for the following medications without required information, (sig/ frequency/qty/etc)      Medication Reconciliation Completed: No     Comments: Pharmacies have been requesting medications for patients without required information, (sig, frequency, qty, etc.). In addition, requests are sent for medication(s) pt. are currently not taking, and medications patients have never taken.    We have spoken to the pharmacies about these request types and advised their teams previously that we are unable to assess these New Script requests and require all details for these requests. This is a known issue and has been reported.     Note composed:8:30 AM 09/18/2023

## 2023-09-19 ENCOUNTER — TELEPHONE (OUTPATIENT)
Dept: HEMATOLOGY/ONCOLOGY | Facility: CLINIC | Age: 63
End: 2023-09-19
Payer: COMMERCIAL

## 2023-09-19 VITALS
SYSTOLIC BLOOD PRESSURE: 104 MMHG | DIASTOLIC BLOOD PRESSURE: 70 MMHG | BODY MASS INDEX: 21.57 KG/M2 | WEIGHT: 117.19 LBS | HEIGHT: 62 IN

## 2023-09-19 LAB
GLUCOSE SERPL-MCNC: 115 MG/DL (ref 60–140)
HDLC SERPL-MCNC: 61 MG/DL
POC CHOLESTEROL, LDL (DOCK): 78 MG/DL
POC CHOLESTEROL, TOTAL: 197 MG/DL
TRIGL SERPL-MCNC: 367 MG/DL

## 2023-09-19 NOTE — TELEPHONE ENCOUNTER
----- Message from Aria Dobbins MD sent at 2023  1:29 PM CDT -----  peter Lenz or whoever sees [facility]  ----- Message -----  From: Dominique Carrasco, RN  Sent: 2023   9:59 AM CDT  To: Aria Dobbins MD    Hey,  You saw her at , do you want to see her for oral surgery clearance or shall I set her up with Dr. Henao?  ----- Message -----  From: Ventura Brooks  Sent: 9/15/2023   3:22 PM CDT  To: Dominique Carrasco, KETURAH    New Cancer Patient Intake Documentation    Diagnosis: tongue cancer    Date patient referral received: self referral (patient advised she saw Dr. Dobbins at  in  and would like to follow up with her to get oral surgery clearance)    Records collected: care everywhere    Questions asked:  What doctor have you seen for this diagnosis?  Aria Dobbins MD ([facility]      What imaging have you had? CT Maxillofacial With Contrast  Date of imagin22  Where did that occur?  [facility]    Have you been diagnosed with cancer by a biopsy and/or surgery? Yes  Date of biopsy:   Date of surgery:  Where did that occur?  [place]    Other pertinent info:  Patient advised she completed chemo/radiation but no surgery  Sent request to [place] for pathology reports, additional clinic notes from Dr. Dobbins, and chemotherapy/radiation treatment summary

## 2023-09-25 ENCOUNTER — PATIENT MESSAGE (OUTPATIENT)
Dept: PRIMARY CARE CLINIC | Facility: CLINIC | Age: 63
End: 2023-09-25
Payer: COMMERCIAL

## 2023-09-25 ENCOUNTER — OFFICE VISIT (OUTPATIENT)
Dept: URGENT CARE | Facility: CLINIC | Age: 63
End: 2023-09-25
Payer: COMMERCIAL

## 2023-09-25 VITALS
BODY MASS INDEX: 21.53 KG/M2 | DIASTOLIC BLOOD PRESSURE: 74 MMHG | WEIGHT: 117 LBS | RESPIRATION RATE: 18 BRPM | TEMPERATURE: 98 F | SYSTOLIC BLOOD PRESSURE: 109 MMHG | OXYGEN SATURATION: 97 % | HEIGHT: 62 IN | HEART RATE: 81 BPM

## 2023-09-25 DIAGNOSIS — R30.0 DYSURIA: ICD-10-CM

## 2023-09-25 DIAGNOSIS — N30.01 ACUTE CYSTITIS WITH HEMATURIA: Primary | ICD-10-CM

## 2023-09-25 LAB
BILIRUB UR QL STRIP: NEGATIVE
GLUCOSE UR QL STRIP: NEGATIVE
KETONES UR QL STRIP: NEGATIVE
LEUKOCYTE ESTERASE UR QL STRIP: NEGATIVE
PH, POC UA: 5.5
POC BLOOD, URINE: POSITIVE
POC NITRATES, URINE: NEGATIVE
PROT UR QL STRIP: NEGATIVE
SP GR UR STRIP: 1.01 (ref 1–1.03)
UROBILINOGEN UR STRIP-ACNC: NORMAL (ref 0.1–1.1)

## 2023-09-25 PROCEDURE — 87186 SC STD MICRODIL/AGAR DIL: CPT | Performed by: NURSE PRACTITIONER

## 2023-09-25 PROCEDURE — 99213 PR OFFICE/OUTPT VISIT, EST, LEVL III, 20-29 MIN: ICD-10-PCS | Mod: S$GLB,,, | Performed by: NURSE PRACTITIONER

## 2023-09-25 PROCEDURE — 81003 URINALYSIS AUTO W/O SCOPE: CPT | Mod: QW,S$GLB,, | Performed by: NURSE PRACTITIONER

## 2023-09-25 PROCEDURE — 87077 CULTURE AEROBIC IDENTIFY: CPT | Performed by: NURSE PRACTITIONER

## 2023-09-25 PROCEDURE — 81003 POCT URINALYSIS, DIPSTICK, AUTOMATED, W/O SCOPE: ICD-10-PCS | Mod: QW,S$GLB,, | Performed by: NURSE PRACTITIONER

## 2023-09-25 PROCEDURE — 99213 OFFICE O/P EST LOW 20 MIN: CPT | Mod: S$GLB,,, | Performed by: NURSE PRACTITIONER

## 2023-09-25 PROCEDURE — 87086 URINE CULTURE/COLONY COUNT: CPT | Performed by: NURSE PRACTITIONER

## 2023-09-25 PROCEDURE — 87088 URINE BACTERIA CULTURE: CPT | Performed by: NURSE PRACTITIONER

## 2023-09-25 RX ORDER — PHENAZOPYRIDINE HYDROCHLORIDE 200 MG/1
200 TABLET, FILM COATED ORAL 3 TIMES DAILY PRN
Qty: 30 TABLET | Refills: 0 | Status: SHIPPED | OUTPATIENT
Start: 2023-09-25 | End: 2023-10-05

## 2023-09-25 RX ORDER — CEPHALEXIN 500 MG/1
500 CAPSULE ORAL EVERY 8 HOURS
Qty: 21 CAPSULE | Refills: 0 | Status: SHIPPED | OUTPATIENT
Start: 2023-09-25 | End: 2023-10-02

## 2023-09-25 NOTE — PATIENT INSTRUCTIONS
After complete evaluation, including thorough history and physical exam, presentation is most consistent with uncomplicated UTI.  The patient has no severe flank pain or systemic symptoms to suggest pyelonephritis or sepsis. Physical exam is inconsistent with nephrolithiasis or acute intra-abdominal infection.  Patient is tolerating PO and is stable for D/C with PO antibiotics.  The patient was informed of findings, and recommended to follow-up with PCP for further management and urine re-check in 3-5 days.         You must understand that you've received an Urgent Care treatment only and that you may be released before all your medical problems are known or treated. You, the patient, will arrange for follow up care as instructed.  Follow up with your PCP or specialty clinic as directed in the next 1-2 weeks if not improved or as needed.  You can call [phone] to schedule an appointment with the appropriate provider.  If your condition worsens we recommend that you receive another evaluation at the emergency room immediately or contact your primary medical clinics after hours call service to discuss your concerns.  Please return here or go to the Emergency Department for any concerns or worsening of condition.    If you were prescribed a narcotic or controlled medication, do not drive or operate heavy equipment or machinery while taking these medications.    Thank you for choosing [facility]!    Our goal in the Urgent Care is to always provide outstanding medical care. You may receive a survey by mail or e-mail in the next week regarding your experience today. We would greatly appreciate you completing and returning the survey. Your feedback provides us with a way to recognize our staff who provide very good care, and it helps us learn how to improve when your experience was below our aspiration of excellence.      We appreciate you trusting us with your medical care. We hope you feel better soon. We  will be happy to take care of you for all of your future medical needs.    Sincerely,    Reagan Espinoza DNP, FNP-C

## 2023-09-25 NOTE — PROGRESS NOTES
"Subjective:      Patient ID: Sarahy Ma is a 63 y.o. female.    Vitals:  height is 5' 2" (1.575 m) and weight is 53.1 kg (117 lb). Her oral temperature is 98.2 °F (36.8 °C). Her blood pressure is 109/74 and her pulse is 81. Her respiration is 18 and oxygen saturation is 97%.     Chief Complaint: Dysuria    63-year-old female presents with a complaint of dysuria, urinary frequency urgency and flank pain.  She reports onset of symptoms, as of yesterday.  She denies a fever, chills, pelvic pain.  History of urinary tract infections with hematuria.  Patient states she was advised to obtain a cystoscope secondary to history of hematuria, she states she denied.  She is established with Urology.    Dysuria   This is a new problem. The current episode started yesterday. The problem occurs every urination. The problem has been gradually worsening. The quality of the pain is described as burning. The pain is at a severity of 8/10. The pain is severe. There has been no fever. She is Not sexually active. There is No history of pyelonephritis. Associated symptoms include flank pain, frequency, sweats and urgency. Pertinent negatives include no behavior changes, chills, discharge, hematuria, hesitancy, nausea, possible pregnancy, vomiting, weight loss, bubble bath use, constipation, rash or withholding. She has tried increased fluids for the symptoms. The treatment provided no relief. Her past medical history is significant for hypertension and recurrent UTIs. There is no history of catheterization, diabetes insipidus, diabetes mellitus, genitourinary reflux, kidney stones, a single kidney, STD, urinary stasis or a urological procedure.       Constitution: Negative for chills, fever and generalized weakness.   Cardiovascular:  Negative for chest pain and palpitations.   Gastrointestinal:  Negative for abdominal pain, nausea, vomiting and constipation.   Genitourinary:  Positive for dysuria, frequency, urgency and flank " pain. Negative for hematuria, history of kidney stones, vaginal discharge, vaginal odor and pelvic pain.   Skin:  Negative for rash.   Neurological:  Negative for dizziness.      Objective:     Physical Exam   Constitutional: She is oriented to person, place, and time. She appears well-developed.   HENT:   Head: Normocephalic and atraumatic.   Ears:   Right Ear: External ear normal.   Left Ear: External ear normal.   Nose: Nose normal. No nasal deformity. No epistaxis.   Mouth/Throat: Oropharynx is clear and moist and mucous membranes are normal.   Eyes: Lids are normal.   Neck: Trachea normal and phonation normal. Neck supple.   Cardiovascular: Normal pulses.   Pulmonary/Chest: Effort normal.   Abdominal: Normal appearance and bowel sounds are normal. She exhibits no distension. Soft. There is no abdominal tenderness.   Musculoskeletal:        Back:       Comments: Mild lumbar pain   Neurological: She is alert and oriented to person, place, and time.   Skin: Skin is warm, dry and intact.   Psychiatric: Her speech is normal and behavior is normal.   Nursing note and vitals reviewed.      Assessment:     1. Acute cystitis with hematuria    2. Dysuria      Results for orders placed or performed in visit on 09/25/23   POCT Urinalysis, Dipstick, Automated, W/O Scope   Result Value Ref Range    POC Blood, Urine Positive (A) Negative    POC Bilirubin, Urine Negative Negative    POC Urobilinogen, Urine normal 0.1 - 1.1    POC Ketones, Urine Negative Negative    POC Protein, Urine Negative Negative    POC Nitrates, Urine Negative Negative    POC Glucose, Urine Negative Negative    pH, UA 5.5     POC Specific Gravity, Urine 1.015 1.003 - 1.029    POC Leukocytes, Urine Negative Negative      Plan:   Acute cystitis with hematuria  -     CULTURE, URINE  -     cephALEXin (KEFLEX) 500 MG capsule; Take 1 capsule (500 mg total) by mouth every 8 (eight) hours. for 7 days  Dispense: 21 capsule; Refill: 0    Dysuria  -     POCT  Urinalysis, Dipstick, Automated, W/O Scope  -     CULTURE, URINE  -     phenazopyridine (PYRIDIUM) 200 MG tablet; Take 1 tablet (200 mg total) by mouth 3 (three) times daily as needed for Pain.  Dispense: 30 tablet; Refill: 0      After complete evaluation, including thorough history and physical exam, presentation is most consistent with uncomplicated UTI.  The patient has no severe flank pain or systemic symptoms to suggest pyelonephritis or sepsis. Physical exam is inconsistent with nephrolithiasis or acute intra-abdominal infection.  Patient is tolerating PO and is stable for D/C with PO antibiotics.  The patient was informed of findings, and recommended to follow-up with PCP for further management and urine re-check in 3-5 days.

## 2023-09-27 DIAGNOSIS — R10.13 DYSPEPSIA: ICD-10-CM

## 2023-09-28 ENCOUNTER — TELEPHONE (OUTPATIENT)
Dept: URGENT CARE | Facility: CLINIC | Age: 63
End: 2023-09-28
Payer: COMMERCIAL

## 2023-09-28 DIAGNOSIS — N39.0 E. COLI UTI: Primary | ICD-10-CM

## 2023-09-28 DIAGNOSIS — B96.20 E. COLI UTI: Primary | ICD-10-CM

## 2023-09-28 LAB — BACTERIA UR CULT: ABNORMAL

## 2023-09-28 RX ORDER — CEPHALEXIN 500 MG/1
500 CAPSULE ORAL EVERY 6 HOURS
Qty: 12 CAPSULE | Refills: 0 | Status: SHIPPED | OUTPATIENT
Start: 2023-09-28 | End: 2023-10-01

## 2023-09-28 NOTE — TELEPHONE ENCOUNTER
Spoke to patient about E coli cultures.  She is on cephalexin 500 3 times a day.  She states she is on day 4 of antibiotics and still having some mild burning.  I instructed her that I will call in a prescription for cephalexin 4 times a day for the next 3 days.  She expressed understanding.

## 2023-10-01 RX ORDER — DICYCLOMINE HYDROCHLORIDE 10 MG/1
10 CAPSULE ORAL
Qty: 270 CAPSULE | Refills: 2 | Status: SHIPPED | OUTPATIENT
Start: 2023-10-01 | End: 2024-02-21

## 2023-10-03 RX ORDER — GABAPENTIN 300 MG/1
300 CAPSULE ORAL 3 TIMES DAILY
Qty: 90 CAPSULE | Refills: 11 | Status: SHIPPED | OUTPATIENT
Start: 2023-10-03 | End: 2024-10-02

## 2023-10-05 ENCOUNTER — TELEPHONE (OUTPATIENT)
Dept: HEMATOLOGY/ONCOLOGY | Facility: CLINIC | Age: 63
End: 2023-10-05
Payer: COMMERCIAL

## 2023-10-05 NOTE — TELEPHONE ENCOUNTER
----- Message from Ventura Brooks sent at 10/5/2023  9:16 AM CDT -----  Patient canceled appointment today because she is not feeling well. Please call her to reschedule.

## 2023-10-12 ENCOUNTER — OFFICE VISIT (OUTPATIENT)
Dept: URGENT CARE | Facility: CLINIC | Age: 63
End: 2023-10-12
Payer: COMMERCIAL

## 2023-10-12 VITALS
BODY MASS INDEX: 21.53 KG/M2 | TEMPERATURE: 98 F | SYSTOLIC BLOOD PRESSURE: 109 MMHG | WEIGHT: 117 LBS | HEIGHT: 62 IN | DIASTOLIC BLOOD PRESSURE: 72 MMHG | HEART RATE: 93 BPM | OXYGEN SATURATION: 96 % | RESPIRATION RATE: 18 BRPM

## 2023-10-12 DIAGNOSIS — R31.9 HEMATURIA, UNSPECIFIED TYPE: ICD-10-CM

## 2023-10-12 DIAGNOSIS — R31.9 URINARY TRACT INFECTION WITH HEMATURIA, SITE UNSPECIFIED: ICD-10-CM

## 2023-10-12 DIAGNOSIS — N39.0 URINARY TRACT INFECTION WITH HEMATURIA, SITE UNSPECIFIED: ICD-10-CM

## 2023-10-12 DIAGNOSIS — R30.0 DYSURIA: Primary | ICD-10-CM

## 2023-10-12 LAB
BILIRUB UR QL STRIP: NEGATIVE
GLUCOSE UR QL STRIP: NEGATIVE
KETONES UR QL STRIP: NEGATIVE
LEUKOCYTE ESTERASE UR QL STRIP: POSITIVE
PH, POC UA: 5 (ref 5–8)
POC BLOOD, URINE: POSITIVE
POC NITRATES, URINE: NEGATIVE
PROT UR QL STRIP: NEGATIVE
SP GR UR STRIP: 1.01 (ref 1–1.03)
UROBILINOGEN UR STRIP-ACNC: NORMAL (ref 0.1–1.1)

## 2023-10-12 PROCEDURE — 99213 OFFICE O/P EST LOW 20 MIN: CPT | Mod: S$GLB,,, | Performed by: PHYSICIAN ASSISTANT

## 2023-10-12 PROCEDURE — 99213 PR OFFICE/OUTPT VISIT, EST, LEVL III, 20-29 MIN: ICD-10-PCS | Mod: S$GLB,,, | Performed by: PHYSICIAN ASSISTANT

## 2023-10-12 PROCEDURE — 81003 URINALYSIS AUTO W/O SCOPE: CPT | Mod: QW,S$GLB,, | Performed by: PHYSICIAN ASSISTANT

## 2023-10-12 PROCEDURE — 81003 POCT URINALYSIS, DIPSTICK, AUTOMATED, W/O SCOPE: ICD-10-PCS | Mod: QW,S$GLB,, | Performed by: PHYSICIAN ASSISTANT

## 2023-10-12 RX ORDER — CIPROFLOXACIN 500 MG/1
500 TABLET ORAL EVERY 12 HOURS
Qty: 20 TABLET | Refills: 0 | Status: SHIPPED | OUTPATIENT
Start: 2023-10-12 | End: 2023-10-22

## 2023-10-12 NOTE — PROGRESS NOTES
"Subjective:      Patient ID: Sarahy Ma is a 63 y.o. female.    Vitals:  height is 5' 2" (1.575 m) and weight is 53.1 kg (117 lb). Her oral temperature is 98.1 °F (36.7 °C). Her blood pressure is 109/72 and her pulse is 93. Her respiration is 18 and oxygen saturation is 96%.     Chief Complaint: Dysuria    PATIENT WITH A HISTORY OF UTIS.  SHE WAS WITH THE TREATED 2-3 WEEKS AGO COMES BACK NOW WITH SAME SYMPTOMS.  No fever chills nausea vomiting diarrhea.  No flank pain.    Dysuria   This is a new problem. The current episode started today. The problem occurs every urination. The problem has been gradually worsening. The quality of the pain is described as aching and burning. The pain is at a severity of 10/10. The pain is severe. There has been no fever. She is Sexually active. There is No history of pyelonephritis. Associated symptoms include a discharge, frequency and urgency. Pertinent negatives include no behavior changes, chills, flank pain, hematuria, hesitancy, nausea, possible pregnancy, sweats, vomiting, weight loss, bubble bath use, constipation, rash or withholding. Associated symptoms comments: Abdominal pain, back pain. She has tried increased fluids and acetaminophen for the symptoms. Improvement on treatment: a little. Her past medical history is significant for hypertension and recurrent UTIs. There is no history of catheterization, diabetes insipidus, diabetes mellitus, genitourinary reflux, kidney stones, a single kidney, STD, urinary stasis or a urological procedure.       Constitution: Negative for chills.   Gastrointestinal:  Negative for nausea, vomiting and constipation.   Genitourinary:  Positive for dysuria, frequency and urgency. Negative for flank pain and hematuria.   Skin:  Negative for rash.      Objective:     Physical Exam   Constitutional: She is oriented to person, place, and time. She appears well-developed.   HENT:   Head: Normocephalic and atraumatic.   Ears:   Right Ear: " External ear normal.   Left Ear: External ear normal.   Nose: Nose normal. No rhinorrhea or congestion.   Mouth/Throat: Mucous membranes are normal.   Eyes: Conjunctivae and lids are normal.   Neck: Trachea normal. Neck supple.   Cardiovascular: Normal rate and regular rhythm.   Pulmonary/Chest: Effort normal. No stridor. No respiratory distress.   Abdominal: Normal appearance and bowel sounds are normal. She exhibits no distension and no mass. Soft. There is no abdominal tenderness. There is no rebound, no guarding, no left CVA tenderness and no right CVA tenderness.   Musculoskeletal: Normal range of motion.         General: Normal range of motion.   Neurological: She is alert and oriented to person, place, and time. She has normal strength.   Skin: Skin is warm, dry, intact, not diaphoretic and not pale.   Psychiatric: Her speech is normal and behavior is normal. Judgment and thought content normal.   Nursing note and vitals reviewed.    Results for orders placed or performed in visit on 10/12/23   POCT Urinalysis, Dipstick, Automated, W/O Scope   Result Value Ref Range    POC Blood, Urine Positive (A) Negative    POC Bilirubin, Urine Negative Negative    POC Urobilinogen, Urine Normal 0.1 - 1.1    POC Ketones, Urine Negative Negative    POC Protein, Urine Negative Negative    POC Nitrates, Urine Negative Negative    POC Glucose, Urine Negative Negative    pH, UA 5.0 5 - 8    POC Specific Gravity, Urine 1.010 1.003 - 1.029    POC Leukocytes, Urine Positive (A) Negative    No results found.       Assessment:     1. Dysuria    2. Hematuria, unspecified type    3. Urinary tract infection with hematuria, site unspecified        Plan:       Dysuria  -     POCT Urinalysis, Dipstick, Automated, W/O Scope    Hematuria, unspecified type  -     Ambulatory referral/consult to Urology    Urinary tract infection with hematuria, site unspecified  -     ciprofloxacin HCl (CIPRO) 500 MG tablet; Take 1 tablet (500 mg total) by  mouth every 12 (twelve) hours. for 10 days  Dispense: 20 tablet; Refill: 0      Follow up if symptoms worsen or fail to improve, for F/U with PCP or ED. There are no Patient Instructions on file for this visit.

## 2023-10-19 ENCOUNTER — OFFICE VISIT (OUTPATIENT)
Dept: HEMATOLOGY/ONCOLOGY | Facility: CLINIC | Age: 63
End: 2023-10-19
Payer: COMMERCIAL

## 2023-10-19 VITALS
DIASTOLIC BLOOD PRESSURE: 69 MMHG | SYSTOLIC BLOOD PRESSURE: 105 MMHG | RESPIRATION RATE: 16 BRPM | BODY MASS INDEX: 21.93 KG/M2 | WEIGHT: 119.19 LBS | HEART RATE: 77 BPM | OXYGEN SATURATION: 96 % | HEIGHT: 62 IN

## 2023-10-19 DIAGNOSIS — K08.9 DENTAL DISEASE: Primary | ICD-10-CM
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
## 2023-10-19 DIAGNOSIS — Z85.810 HISTORY OF TONGUE CANCER: ICD-10-CM

## 2023-10-19 DIAGNOSIS — K11.7 XEROSTOMIA DUE TO RADIOTHERAPY: ICD-10-CM

## 2023-10-19 PROCEDURE — 3078F PR MOST RECENT DIASTOLIC BLOOD PRESSURE < 80 MM HG: ICD-10-PCS | Mod: CPTII,S$GLB,, | Performed by: INTERNAL MEDICINE

## 2023-10-19 PROCEDURE — 99205 OFFICE O/P NEW HI 60 MIN: CPT | Mod: S$GLB,,, | Performed by: INTERNAL MEDICINE

## 2023-10-19 PROCEDURE — 3008F BODY MASS INDEX DOCD: CPT | Mod: CPTII,S$GLB,, | Performed by: INTERNAL MEDICINE

## 2023-10-19 PROCEDURE — 3078F DIAST BP <80 MM HG: CPT | Mod: CPTII,S$GLB,, | Performed by: INTERNAL MEDICINE

## 2023-10-19 PROCEDURE — 1160F RVW MEDS BY RX/DR IN RCRD: CPT | Mod: CPTII,S$GLB,, | Performed by: INTERNAL MEDICINE

## 2023-10-19 PROCEDURE — 1159F MED LIST DOCD IN RCRD: CPT | Mod: CPTII,S$GLB,, | Performed by: INTERNAL MEDICINE

## 2023-10-19 PROCEDURE — 1159F PR MEDICATION LIST DOCUMENTED IN MEDICAL RECORD: ICD-10-PCS | Mod: CPTII,S$GLB,, | Performed by: INTERNAL MEDICINE

## 2023-10-19 PROCEDURE — 3074F PR MOST RECENT SYSTOLIC BLOOD PRESSURE < 130 MM HG: ICD-10-PCS | Mod: CPTII,S$GLB,, | Performed by: INTERNAL MEDICINE

## 2023-10-19 PROCEDURE — 3008F PR BODY MASS INDEX (BMI) DOCUMENTED: ICD-10-PCS | Mod: CPTII,S$GLB,, | Performed by: INTERNAL MEDICINE

## 2023-10-19 PROCEDURE — 99999 PR PBB SHADOW E&M-EST. PATIENT-LVL IV: ICD-10-PCS | Mod: PBBFAC,,, | Performed by: INTERNAL MEDICINE

## 2023-10-19 PROCEDURE — 99999 PR PBB SHADOW E&M-EST. PATIENT-LVL IV: CPT | Mod: PBBFAC,,, | Performed by: INTERNAL MEDICINE

## 2023-10-19 PROCEDURE — 99205 PR OFFICE/OUTPT VISIT, NEW, LEVL V, 60-74 MIN: ICD-10-PCS | Mod: S$GLB,,, | Performed by: INTERNAL MEDICINE

## 2023-10-19 PROCEDURE — 1160F PR REVIEW ALL MEDS BY PRESCRIBER/CLIN PHARMACIST DOCUMENTED: ICD-10-PCS | Mod: CPTII,S$GLB,, | Performed by: INTERNAL MEDICINE

## 2023-10-19 PROCEDURE — 3074F SYST BP LT 130 MM HG: CPT | Mod: CPTII,S$GLB,, | Performed by: INTERNAL MEDICINE

## 2023-10-19 RX ORDER — PILOCARPINE HYDROCHLORIDE 5 MG/1
5 TABLET, FILM COATED ORAL 3 TIMES DAILY
Qty: 90 TABLET | Refills: 11 | Status: SHIPPED | OUTPATIENT
Start: 2023-10-19 | End: 2024-02-21

## 2023-10-19 NOTE — PROGRESS NOTES
"PATIENT: Sarahy Ma  MRN: [id number]  DATE: 10/19/2023    Subjective:     Chief complaint:  Chief Complaint   Patient presents with    Advice Only       Oncologic History:  Treated in 2009 by Solange Kothari and Lola for left lateral tongue cancer; she was treated with concurrent chemoradiation with cisplatin+cetuximab. She has been CARIE since completion of treatment. She had followed with Dr. Kothari until her custodial then she followed with Dr. Dobbins who had taken Dr. Kothari's place. She presents for medical oncology evaluation in setting of prior history of chemoradiation. She would like to have a dental implant put in in tooth #4 and was told she would need medical clearance prior to procedure.   She has no complaints at this time.   She is a former smoker with fairly limited smoking history.   Treatment records from [facility] reviewed; discussed with Dr. Davila as well.                Review of Systems   A comprehensive review of systems was performed; pertinent positives and negatives are noted in the HPI.        ECOG Performance Status:   ECOG SCORE    0 - Fully active-able to carry on all pre-disease performance without restriction         Objective:      Vitals:   Vitals:    10/19/23 1309   BP: 105/69   BP Location: Right arm   Patient Position: Sitting   BP Method: Medium (Automatic)   Pulse: 77   Resp: 16   SpO2: 96%   Weight: 54.1 kg (119 lb 2.5 oz)   Height: 5' 2" (1.575 m)     BMI: Body mass index is 21.79 kg/m².      Physical Exam:   Physical Exam  Vitals and nursing note reviewed.   Constitutional:       General: She is not in acute distress.     Appearance: Normal appearance. She is not toxic-appearing.   HENT:      Head: Normocephalic and atraumatic.      Mouth/Throat:      Lips: No lesions.      Mouth: Mucous membranes are moist. No oral lesions.      Dentition: No gingival swelling or gum lesions.      Tongue: No lesions.      Pharynx: Oropharynx is clear. No oropharyngeal exudate.   Eyes:     "  General: No scleral icterus.     Conjunctiva/sclera: Conjunctivae normal.   Pulmonary:      Effort: Pulmonary effort is normal. No respiratory distress.   Musculoskeletal:         General: No swelling or deformity.   Skin:     Coloration: Skin is not jaundiced.      Findings: No erythema.   Neurological:      General: No focal deficit present.      Mental Status: She is alert and oriented to person, place, and time.      Motor: No weakness.   Psychiatric:         Mood and Affect: Mood normal.         Behavior: Behavior normal.           Laboratory Data:  WBC   Date Value Ref Range Status   09/05/2023 5.68 3.90 - 12.70 K/uL Final     Hemoglobin   Date Value Ref Range Status   09/05/2023 13.5 12.0 - 16.0 g/dL Final     Hematocrit   Date Value Ref Range Status   09/05/2023 41.7 37.0 - 48.5 % Final     Platelets   Date Value Ref Range Status   09/05/2023 203 150 - 450 K/uL Final     Gran # (ANC)   Date Value Ref Range Status   09/05/2023 3.3 1.8 - 7.7 K/uL Final     Gran %   Date Value Ref Range Status   09/05/2023 57.9 38.0 - 73.0 % Final       Chemistry        Component Value Date/Time     09/05/2023 1211    K 4.5 09/05/2023 1211     09/05/2023 1211    CO2 24 09/05/2023 1211    BUN 15 09/05/2023 1211    CREATININE 0.9 09/05/2023 1211    GLU 86 09/05/2023 1211        Component Value Date/Time    CALCIUM 9.1 09/05/2023 1211    ALKPHOS 94 09/05/2023 1211    AST 18 09/05/2023 1211    ALT 12 09/05/2023 1211    BILITOT 0.5 09/05/2023 1211    ESTGFRAFRICA >60.0 10/26/2021 0733    EGFRNONAA >60.0 10/26/2021 0733              Assessment/Plan:     1. Dental disease    2. 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 She is to have dental implant placed for tooth #4 however due to history of radiation to head and neck, her dental team would like clearance for her to get surgery. I reviewed her prior radiation records and discussed with Dr. Davila as  well. She did receive full dose of radiation to the mandible and if she were to have work done on the mandible she likely would need hyperbaric therapy to aid in healing process. It appears that maxillary jaw was not directly radiated and as such the risks of complications from dental procedures is likely not substantially elevated. If dental implant is indicated and necessary, it would be appropriate to proceed. Her prior irradiation history likely warrants close post-procedural follow-up to ensure adequate healing and low threshold for referral to hyperbarics if it appears there is any delay in expected healing.     Med and Orders:  Orders Placed This Encounter    pilocarpine (SALAGEN) 5 MG Tab       Follow Up:  Follow up if symptoms worsen or fail to improve.      Above care plan was discussed with patient and all questions were addressed to their expressed satisfaction.       Julio Henao MD, FACP  Hematology & Medical Oncology  [facility]     Total time of this visit, including time spent face to face with patient and/or via video/audio, and also in preparing for today's visit for MDM and documentation. (Medical Decision Making, including consideration of possible diagnoses, management options, complex medical record review, review of diagnostic tests and information, consideration and discussion of significant complications based on comorbidities, and discussion with providers involved with the care of the patient) 61 minutes. Greater than 50% was spent face to face with the patient counseling and coordinating care.

## 2023-10-20 ENCOUNTER — OFFICE VISIT (OUTPATIENT)
Dept: UROLOGY | Facility: CLINIC | Age: 63
End: 2023-10-20
Payer: COMMERCIAL

## 2023-10-20 VITALS
WEIGHT: 119.25 LBS | HEIGHT: 62 IN | BODY MASS INDEX: 21.94 KG/M2 | SYSTOLIC BLOOD PRESSURE: 101 MMHG | HEART RATE: 91 BPM | DIASTOLIC BLOOD PRESSURE: 68 MMHG

## 2023-10-20 DIAGNOSIS — N39.0 RECURRENT UTI: ICD-10-CM

## 2023-10-20 DIAGNOSIS — R31.29 MICROSCOPIC HEMATURIA: ICD-10-CM

## 2023-10-20 DIAGNOSIS — R31.29 MICROSCOPIC HEMATURIA: Primary | ICD-10-CM

## 2023-10-20 PROCEDURE — 3074F SYST BP LT 130 MM HG: CPT | Mod: CPTII,S$GLB,, | Performed by: UROLOGY

## 2023-10-20 PROCEDURE — 1160F PR REVIEW ALL MEDS BY PRESCRIBER/CLIN PHARMACIST DOCUMENTED: ICD-10-PCS | Mod: CPTII,S$GLB,, | Performed by: UROLOGY

## 2023-10-20 PROCEDURE — 1160F RVW MEDS BY RX/DR IN RCRD: CPT | Mod: CPTII,S$GLB,, | Performed by: UROLOGY

## 2023-10-20 PROCEDURE — 1159F PR MEDICATION LIST DOCUMENTED IN MEDICAL RECORD: ICD-10-PCS | Mod: CPTII,S$GLB,, | Performed by: UROLOGY

## 2023-10-20 PROCEDURE — 3008F BODY MASS INDEX DOCD: CPT | Mod: CPTII,S$GLB,, | Performed by: UROLOGY

## 2023-10-20 PROCEDURE — 3008F PR BODY MASS INDEX (BMI) DOCUMENTED: ICD-10-PCS | Mod: CPTII,S$GLB,, | Performed by: UROLOGY

## 2023-10-20 PROCEDURE — 3078F DIAST BP <80 MM HG: CPT | Mod: CPTII,S$GLB,, | Performed by: UROLOGY

## 2023-10-20 PROCEDURE — 99999 PR PBB SHADOW E&M-EST. PATIENT-LVL III: CPT | Mod: PBBFAC,,, | Performed by: UROLOGY

## 2023-10-20 PROCEDURE — 3074F PR MOST RECENT SYSTOLIC BLOOD PRESSURE < 130 MM HG: ICD-10-PCS | Mod: CPTII,S$GLB,, | Performed by: UROLOGY

## 2023-10-20 PROCEDURE — 99999 PR PBB SHADOW E&M-EST. PATIENT-LVL III: ICD-10-PCS | Mod: PBBFAC,,, | Performed by: UROLOGY

## 2023-10-20 PROCEDURE — 99214 PR OFFICE/OUTPT VISIT, EST, LEVL IV, 30-39 MIN: ICD-10-PCS | Mod: S$GLB,,, | Performed by: UROLOGY

## 2023-10-20 PROCEDURE — 3078F PR MOST RECENT DIASTOLIC BLOOD PRESSURE < 80 MM HG: ICD-10-PCS | Mod: CPTII,S$GLB,, | Performed by: UROLOGY

## 2023-10-20 PROCEDURE — 1159F MED LIST DOCD IN RCRD: CPT | Mod: CPTII,S$GLB,, | Performed by: UROLOGY

## 2023-10-20 PROCEDURE — 99214 OFFICE O/P EST MOD 30 MIN: CPT | Mod: S$GLB,,, | Performed by: UROLOGY

## 2023-10-20 RX ORDER — CONJUGATED ESTROGENS 0.62 MG/G
1 CREAM VAGINAL
Qty: 1 APPLICATOR | Refills: 3 | Status: SHIPPED | OUTPATIENT
Start: 2023-10-23 | End: 2023-10-20

## 2023-10-20 RX ORDER — PHENAZOPYRIDINE HYDROCHLORIDE 200 MG/1
200 TABLET, FILM COATED ORAL 3 TIMES DAILY PRN
Qty: 9 TABLET | Refills: 0 | Status: SHIPPED | OUTPATIENT
Start: 2023-10-20 | End: 2023-10-23

## 2023-10-20 RX ORDER — METHENAMINE HIPPURATE 1000 MG/1
1 TABLET ORAL 2 TIMES DAILY
Qty: 60 TABLET | Refills: 6 | Status: SHIPPED | OUTPATIENT
Start: 2023-10-20 | End: 2023-11-02

## 2023-10-20 RX ORDER — ESTRADIOL 0.1 MG/G
1 CREAM VAGINAL
Qty: 42.5 G | Refills: 3 | Status: SHIPPED | OUTPATIENT
Start: 2023-10-23 | End: 2024-10-22

## 2023-10-20 RX ORDER — IBUPROFEN 100 MG/5ML
1000 SUSPENSION, ORAL (FINAL DOSE FORM) ORAL 2 TIMES DAILY
Qty: 60 TABLET | Refills: 5 | COMMUNITY
Start: 2023-10-20 | End: 2024-02-21

## 2023-10-20 NOTE — PROGRESS NOTES
Subjective:       Patient ID: Sarahy Ma is a 63 y.o. female.    Chief Complaint: Continuous uti infections and blood in urine     This is a 63 y.o.  female patient that is new to me but not new to the system.    H/o recurrent UTI and microhematuria seen by ERON Royal in past.   UA micro with 6/5 RBC/hpf in 5-6/23.    CTU negative 7/23.  Multiple E coli UTIs in 2023.  H/o hysterectomy in ? 2015 for prolapse, ovaries removed as well.  Increasing frequency of UTI since, symptoms: severe dysuria, frequency/urgency, hematuria at time of recent UTI 10/23.  On 10 days of cipro BID started 10/12.  Continued dysuria.  Not sexually active, can't tie UTI to certain behavior.        Lab Results   Component Value Date    CREATININE 0.9 09/05/2023       ---  PMH/PSH/Medications/Allergies/Social history reviewed and as in chart.    Review of Systems   Constitutional:  Negative for activity change, chills, fatigue and fever.   Respiratory:  Negative for cough, chest tightness and shortness of breath.    Cardiovascular:  Negative for chest pain.   Gastrointestinal:  Negative for abdominal distention, abdominal pain, nausea and vomiting.   Genitourinary:  Positive for dysuria, frequency and hematuria. Negative for difficulty urinating, flank pain and pelvic pain.   Musculoskeletal:  Negative for back pain and gait problem.       Objective:      Physical Exam  HENT:      Head: Normocephalic.   Pulmonary:      Effort: Pulmonary effort is normal.   Abdominal:      General: Abdomen is flat.   Musculoskeletal:      Cervical back: Normal range of motion.   Skin:     General: Skin is warm.   Neurological:      General: No focal deficit present.      Mental Status: She is alert.         Assessment:     Problem Noted   Microscopic Hematuria 10/20/2023    Past smoker  Gross hematuria with UTI 10/23  CTU 7/23: negative  Needs cystoscopy      Recurrent Uti 10/20/2023     E coli 9/23, 1/23, 1/23, 1/21  Hippurate + Vitamin C started  10/23  Vaginal estrogen 10/23         Plan:     Follow up for cystoscopy, Finish cipro  Methenamine and vit c  Vaginal estrogen.  Side effects, risks, benefits and alternatives of the medication discussed.      Tony Russell MD    The above referenced imaging and interpretations were personally reviewed.  Disclaimer: This note has been generated using voice-recognition software. There may be typographical errors that have been missed during proof-reading.

## 2023-10-31 ENCOUNTER — PATIENT MESSAGE (OUTPATIENT)
Dept: UROLOGY | Facility: CLINIC | Age: 63
End: 2023-10-31
Payer: COMMERCIAL

## 2023-11-02 DIAGNOSIS — N39.0 RECURRENT UTI: Primary | ICD-10-CM

## 2023-11-07 ENCOUNTER — TELEPHONE (OUTPATIENT)
Dept: HEMATOLOGY/ONCOLOGY | Facility: CLINIC | Age: 63
End: 2023-11-07
Payer: COMMERCIAL

## 2023-11-07 NOTE — TELEPHONE ENCOUNTER
Spoke with Danielle she was needed some information on what patient had with her radiation. I told her she would have to reach out to Dr. Davila's office. I have her the number and told her if they give her any issues to let me know and I will see what I can do to get it. She will call back if need be and thanked me for all my help.         ----- Message from Wesly Painter sent at 11/7/2023  8:28 AM CST -----  [facility]  .Type:  Needs Medical Advice    Who Called:  [facility]  Would the patient rather a call back or a response via [facility]?  Call back  Best Call Back Number: [phone]  Additional Information: Danielle is returning a call back to the nurse [place] regarding the above patient.

## 2023-11-15 ENCOUNTER — PROCEDURE VISIT (OUTPATIENT)
Dept: UROLOGY | Facility: CLINIC | Age: 63
End: 2023-11-15
Payer: COMMERCIAL

## 2023-11-15 VITALS
SYSTOLIC BLOOD PRESSURE: 106 MMHG | DIASTOLIC BLOOD PRESSURE: 72 MMHG | HEART RATE: 81 BPM | HEIGHT: 62 IN | BODY MASS INDEX: 21.76 KG/M2 | WEIGHT: 118.25 LBS

## 2023-11-15 DIAGNOSIS — R31.29 MICROSCOPIC HEMATURIA: ICD-10-CM

## 2023-11-15 DIAGNOSIS — N39.0 RECURRENT UTI: ICD-10-CM

## 2023-11-15 PROCEDURE — 52000 CYSTOURETHROSCOPY: CPT | Mod: S$GLB,,, | Performed by: UROLOGY

## 2023-11-15 PROCEDURE — 99213 PR OFFICE/OUTPT VISIT, EST, LEVL III, 20-29 MIN: ICD-10-PCS | Mod: 25,S$GLB,, | Performed by: UROLOGY

## 2023-11-15 PROCEDURE — 52000 CYSTOSCOPY: ICD-10-PCS | Mod: S$GLB,,, | Performed by: UROLOGY

## 2023-11-15 PROCEDURE — 99213 OFFICE O/P EST LOW 20 MIN: CPT | Mod: 25,S$GLB,, | Performed by: UROLOGY

## 2023-11-15 NOTE — PROGRESS NOTES
Subjective:       Patient ID: Sarahy Ma is a 63 y.o. female.    Chief Complaint: Procedure (cysto)     This is a 63 y.o.  female patient that is new to me but not new to the system.    H/o recurrent UTI and microhematuria seen by ERON Royal in past.   UA micro with 6/5 RBC/hpf in 5-6/23.    CTU negative 7/23.  Multiple E coli UTIs in 2023.  H/o hysterectomy in ? 2015 for prolapse, ovaries removed as well.  Increasing frequency of UTI since, symptoms: severe dysuria, frequency/urgency, hematuria at time of recent UTI 10/23.  On 10 days of cipro BID started 10/12.  Continued dysuria.  Not sexually active, can't tie UTI to certain behavior.    Cystoscopy 11/23:  Atrophic vaginitis noted on external exam, no significant findings in bladder.      Lab Results   Component Value Date    CREATININE 0.9 09/05/2023       ---  PMH/PSH/Medications/Allergies/Social history reviewed and as in chart.    Review of Systems   Constitutional:  Negative for activity change, chills, fatigue and fever.   Respiratory:  Negative for cough, chest tightness and shortness of breath.    Cardiovascular:  Negative for chest pain.   Gastrointestinal:  Negative for abdominal distention, abdominal pain, nausea and vomiting.   Genitourinary:  Positive for frequency. Negative for difficulty urinating, dysuria, flank pain, hematuria and pelvic pain.   Musculoskeletal:  Negative for back pain and gait problem.       Objective:      Physical Exam  HENT:      Head: Normocephalic.   Pulmonary:      Effort: Pulmonary effort is normal.   Abdominal:      General: Abdomen is flat.   Musculoskeletal:      Cervical back: Normal range of motion.   Skin:     General: Skin is warm.   Neurological:      General: No focal deficit present.      Mental Status: She is alert.         Assessment:     Problem Noted   Microscopic Hematuria 10/20/2023    Past smoker  Gross hematuria with UTI 10/23  CTU 7/23: negative  Cystoscopy 11/23:  Atrophic vaginitis noted on  external exam, no significant findings in bladder.     Recurrent Uti 10/20/2023     E coli 9/23, 1/23, 1/23, 1/21  D Mannose started 10/23  Vaginal estrogen 10/23         Plan:     Cystoscopy done and reviewed.  Separate from procedure discussed recurrent UTIs, continue D mannose and vaginal estrogen for now.  If recurrent symptomatic concern for infections let me know and we will have straight catheterized urine done.  Follow-up in 6 months    Tony Russell MD    The above referenced imaging and interpretations were personally reviewed.  Disclaimer: This note has been generated using voice-recognition software. There may be typographical errors that have been missed during proof-reading.

## 2023-11-15 NOTE — PROCEDURES
Cystoscopy    Date/Time: 11/15/2023 10:00 AM    Performed by: Tony Russell MD  Authorized by: Tony Russell MD    Timeout: prior to procedure the correct patient, procedure, and site was verified    Prep: patient was prepped and draped in usual sterile fashion    Local anesthesia used?: Yes    Anesthesia:  Lidocaine jelly  Indications: recurrent UTIs    Position:  Supine  Anesthesia:  Lidocaine jelly  Patient sedated?: No    Preparation: Patient was prepped and draped in usual sterile fashion    Scope type:  Flexible cystoscope  External exam normal: No (Atrophic vaginitis)    Digital exam performed: No    Urethra normal: Yes    Bladder neck normal: Yes    Bladder normal: Yes     patient tolerated the procedure well with no immediate complications

## 2023-11-29 ENCOUNTER — OFFICE VISIT (OUTPATIENT)
Dept: PRIMARY CARE CLINIC | Facility: CLINIC | Age: 63
End: 2023-11-29
Payer: COMMERCIAL

## 2023-11-29 VITALS
OXYGEN SATURATION: 98 % | HEART RATE: 81 BPM | SYSTOLIC BLOOD PRESSURE: 102 MMHG | WEIGHT: 116.88 LBS | BODY MASS INDEX: 21.51 KG/M2 | HEIGHT: 62 IN | DIASTOLIC BLOOD PRESSURE: 68 MMHG

## 2023-11-29 DIAGNOSIS — E89.0 POSTABLATIVE HYPOTHYROIDISM: ICD-10-CM

## 2023-11-29 DIAGNOSIS — N39.0 RECURRENT UTI: ICD-10-CM

## 2023-11-29 DIAGNOSIS — K51.80 OTHER ULCERATIVE COLITIS WITHOUT COMPLICATION: ICD-10-CM

## 2023-11-29 DIAGNOSIS — Z85.810 HISTORY OF TONGUE CANCER: Primary | ICD-10-CM

## 2023-11-29 DIAGNOSIS — I10 ESSENTIAL HYPERTENSION: ICD-10-CM

## 2023-11-29 DIAGNOSIS — Z87.891 PERSONAL HISTORY OF NICOTINE DEPENDENCE: ICD-10-CM

## 2023-11-29 DIAGNOSIS — M51.36 DEGENERATIVE DISC DISEASE, LUMBAR: ICD-10-CM

## 2023-11-29 PROBLEM — R31.29 MICROSCOPIC HEMATURIA: Status: RESOLVED | Noted: 2023-10-20 | Resolved: 2023-11-29

## 2023-11-29 PROCEDURE — 1159F PR MEDICATION LIST DOCUMENTED IN MEDICAL RECORD: ICD-10-PCS | Mod: CPTII,S$GLB,, | Performed by: INTERNAL MEDICINE

## 2023-11-29 PROCEDURE — 3074F PR MOST RECENT SYSTOLIC BLOOD PRESSURE < 130 MM HG: ICD-10-PCS | Mod: CPTII,S$GLB,, | Performed by: INTERNAL MEDICINE

## 2023-11-29 PROCEDURE — 99396 PREV VISIT EST AGE 40-64: CPT | Mod: S$GLB,,, | Performed by: INTERNAL MEDICINE

## 2023-11-29 PROCEDURE — 1159F MED LIST DOCD IN RCRD: CPT | Mod: CPTII,S$GLB,, | Performed by: INTERNAL MEDICINE

## 2023-11-29 PROCEDURE — 99999 PR PBB SHADOW E&M-EST. PATIENT-LVL IV: ICD-10-PCS | Mod: PBBFAC,,, | Performed by: INTERNAL MEDICINE

## 2023-11-29 PROCEDURE — 3044F HG A1C LEVEL LT 7.0%: CPT | Mod: CPTII,S$GLB,, | Performed by: INTERNAL MEDICINE

## 2023-11-29 PROCEDURE — 99396 PR PREVENTIVE VISIT,EST,40-64: ICD-10-PCS | Mod: S$GLB,,, | Performed by: INTERNAL MEDICINE

## 2023-11-29 PROCEDURE — 3044F PR MOST RECENT HEMOGLOBIN A1C LEVEL <7.0%: ICD-10-PCS | Mod: CPTII,S$GLB,, | Performed by: INTERNAL MEDICINE

## 2023-11-29 PROCEDURE — 3008F PR BODY MASS INDEX (BMI) DOCUMENTED: ICD-10-PCS | Mod: CPTII,S$GLB,, | Performed by: INTERNAL MEDICINE

## 2023-11-29 PROCEDURE — 3008F BODY MASS INDEX DOCD: CPT | Mod: CPTII,S$GLB,, | Performed by: INTERNAL MEDICINE

## 2023-11-29 PROCEDURE — 3074F SYST BP LT 130 MM HG: CPT | Mod: CPTII,S$GLB,, | Performed by: INTERNAL MEDICINE

## 2023-11-29 PROCEDURE — 3078F DIAST BP <80 MM HG: CPT | Mod: CPTII,S$GLB,, | Performed by: INTERNAL MEDICINE

## 2023-11-29 PROCEDURE — 99999 PR PBB SHADOW E&M-EST. PATIENT-LVL IV: CPT | Mod: PBBFAC,,, | Performed by: INTERNAL MEDICINE

## 2023-11-29 PROCEDURE — 3078F PR MOST RECENT DIASTOLIC BLOOD PRESSURE < 80 MM HG: ICD-10-PCS | Mod: CPTII,S$GLB,, | Performed by: INTERNAL MEDICINE

## 2023-11-29 RX ORDER — AMLODIPINE BESYLATE 5 MG/1
5 TABLET ORAL DAILY
Qty: 90 TABLET | Refills: 3 | Status: SHIPPED | OUTPATIENT
Start: 2023-11-29 | End: 2024-11-28

## 2023-11-29 NOTE — ASSESSMENT & PLAN NOTE
Sees Dr. Russell, on D mannose and vaginal estrogen since 10/2023. No UTI's since starting that regimen.

## 2023-11-29 NOTE — ASSESSMENT & PLAN NOTE
Seeing Dr. Thornton pain mgmt, last MRI 10/2022, didn't wind up doing KALLIE because pain improved with gabapentin. Taking tylenol and gabapentin, avoiding NSAIDS 2/2 UC.  Did PT, now doing exercises at home.    10/2021 MRI  Moderate canal stenosis at L3-L4 due to diffuse disc bulge, facet joint osseous hypertrophy and ligamentum flavum hypertrophy.  Adjacent endplate edema at L2-3 consistent with active degenerative disc disease.

## 2023-11-29 NOTE — PROGRESS NOTES
[facility]    Chief Complaint      Chief Complaint   Patient presents with    Follow-up     History of Present Illness      Sarahy Ma is a 63 y.o. female who presents today for Annual preventative visit.  Patient comes to appointment alone.    Problem List Items Addressed This Visit       Postablative hypothyroidism    Current Assessment & Plan     Stable on synthroid 75 mcg daily, no issues at present.         Ulcerative colitis    Current Assessment & Plan     On mesalamine, works well for her.  Sees Dr. Forte.  Cscope 2/2021.  Has been doing better.         History of tongue cancer - Primary    Overview     Diagnosed/treated at [facility] in 2009.  Reportedly Stage 3 at time of diagnosis => treated with XRT/chemotherapy.         Current Assessment & Plan     Sees Dr. Henao annually.  No longer needs scans.         Degenerative disc disease, lumbar    Current Assessment & Plan     Seeing Dr. Hillary jean-baptiste mgmt, last MRI 10/2022, didn't wind up doing KALLIE because pain improved with gabapentin. Taking tylenol and gabapentin, avoiding NSAIDS 2/2 UC.  Did PT, now doing exercises at home.    10/2021 MRI  Moderate canal stenosis at L3-L4 due to diffuse disc bulge, facet joint osseous hypertrophy and ligamentum flavum hypertrophy.  Adjacent endplate edema at L2-3 consistent with active degenerative disc disease.            Essential hypertension    Current Assessment & Plan     BP controlled on Norvasc 10 mg, no SOB/CP/HA.         Relevant Medications    amLODIPine (NORVASC) 5 MG tablet    Recurrent UTI    Overview      E coli 9/23, 1/23, 1/23, 1/21  D Mannose started 10/23  Vaginal estrogen 10/23         Current Assessment & Plan     Sees Dr. Russell, on D mannose and vaginal estrogen since 10/2023. No UTI's since starting that regimen.          Other Visit Diagnoses       Personal history of nicotine dependence        Relevant Orders    CT Chest Lung Screening Low Dose               Health Maintenance   Topic Date Due    Shingles Vaccine (1 of 2) Never done    LDCT Lung Screen  2022    Mammogram  2024    Colorectal Cancer Screening  2026    TETANUS VACCINE  2027    Lipid Panel  2028    Hepatitis C Screening  Completed       Past Medical History:   Diagnosis Date    Hypertension     Postablative hypothyroidism     secondary to the radiation treatment for tongue cancer - treated by oncologist    Tongue cancer     Ulcerative colitis     Treated by Dr. Tobias    Ulcerative colitis        Past Surgical History:   Procedure Laterality Date     SECTION      CHOLECYSTECTOMY  2016    removed    CHOLECYSTECTOMY  2016    COLONOSCOPY      Dr. Tobias    COLONOSCOPY N/A 2021    Procedure: COLONOSCOPY Suprep;  Surgeon: Chetan Forte MD;  Location: [facility] ENDO;  Service: Endoscopy;  Laterality: N/A;    feeding tube      groin surgery  2017    swollen lump node    HYSTERECTOMY      OOPHORECTOMY      PORTACATH PLACEMENT      TONSILLECTOMY      TRANSFORAMINAL EPIDURAL INJECTION OF STEROID Right 2021    Procedure: LUMBAR TRANSFORAMINAL RIGHT L2/3 AND L3/4 DIRECT REFERRAL ;  Surgeon: Tana Beckwith MD;  Location: [facility] PAIN T;  Service: Pain Management;  Laterality: Right;    TUBAL LIGATION         family history includes Arthritis in her father and mother; Cancer in her paternal grandmother; Diabetes in her father; Early death in her brother; Heart attack in her father; Heart disease in her father; Heart failure in her father; Hyperlipidemia in her father; Hypertension in her father and mother; Thyroid disease in her brother, brother, sister, and sister.    Social History     Tobacco Use    Smoking status: Former     Current packs/day: 0.00     Average packs/day: 1 pack/day for 28.7 years (28.7 ttl pk-yrs)     Types: Cigarettes     Start date: 1980     Quit date: 5/15/2009     Years since quittin.5     Passive exposure:  Never    Smokeless tobacco: Never   Substance Use Topics    Alcohol use: Not Currently     Comment: occasionally    Drug use: No       Review of Systems   Constitutional:  Negative for chills and fever.   Respiratory:  Negative for cough and shortness of breath.    Cardiovascular:  Negative for chest pain and palpitations.   Gastrointestinal:  Negative for constipation, diarrhea, nausea and vomiting.   Genitourinary:  Negative for dysuria and hematuria.   Musculoskeletal:  Negative for falls.   Neurological:  Negative for headaches.        Outpatient Encounter Medications as of 11/29/2023   Medication Sig Dispense Refill    ascorbic acid, vitamin C, (VITAMIN C) 1000 MG tablet Take 1 tablet (1,000 mg total) by mouth 2 (two) times daily. 60 tablet 5    d-mannose 500 mg Cap Take 2 g by mouth once daily. 90 capsule 3    dicyclomine (BENTYL) 10 MG capsule TAKE 1 CAPSULE (10 MG TOTAL) BY MOUTH BEFORE MEALS AS NEEDED (ABDOMINAL UPSET). 270 capsule 2    estradioL (ESTRACE) 0.01 % (0.1 mg/gram) vaginal cream Place 1 g vaginally twice a week. 42.5 g 3    fluticasone furoate-vilanteroL (BREO ELLIPTA) 100-25 mcg/dose diskus inhaler Inhale 1 puff into the lungs once daily. Controller 180 each 2    fluticasone propionate (FLONASE) 50 mcg/actuation nasal spray 2 sprays (100 mcg total) by Each Nostril route once daily. 16 g 2    gabapentin (NEURONTIN) 300 MG capsule Take 1 capsule (300 mg total) by mouth 3 (three) times daily. 90 capsule 11    meclizine (ANTIVERT) 25 mg tablet Take 1 tablet (25 mg total) by mouth 3 (three) times daily as needed for Dizziness. 20 tablet 0    mesalamine (APRISO) 0.375 gram Cp24 Take 4 capsules (1.5 g total) by mouth once daily. Can taper to 2 capsules per day if symptoms controlled 120 capsule 11    ondansetron (ZOFRAN-ODT) 4 MG TbDL Take 1 tablet (4 mg total) by mouth every 8 (eight) hours as needed (nausea). 10 tablet 0    pantoprazole (PROTONIX) 40 MG tablet TAKE 1 TABLET BY MOUTH EVERY DAY 90  "tablet 2    pilocarpine (SALAGEN) 5 MG Tab Take 1 tablet (5 mg total) by mouth 3 (three) times daily. 90 tablet 11    SYNTHROID 75 mcg tablet Take 1 tablet (75 mcg total) by mouth before breakfast. 90 tablet 3    [DISCONTINUED] amLODIPine (NORVASC) 10 MG tablet Take 1 tablet (10 mg total) by mouth once daily. (Patient taking differently: Take 5 mg by mouth once daily.) 90 tablet 3    amLODIPine (NORVASC) 5 MG tablet Take 1 tablet (5 mg total) by mouth once daily. 90 tablet 3    [DISCONTINUED] methenamine (HIPREX) 1 gram Tab Take 1 tablet (1 g total) by mouth 2 (two) times daily. 60 tablet 6     No facility-administered encounter medications on file as of 11/29/2023.        Review of patient's allergies indicates:   Allergen Reactions    Amoxicillin      Other reaction(s): fever blisters    Nsaids (non-steroidal anti-inflammatory drug)      Other reaction(s): nausea, stomach issues       Physical Exam      Vital Signs  Pulse: 81  SpO2: 98 %  BP: 102/68  Pain Score: 0-No pain  Height and Weight  Height: 5' 2" (157.5 cm)  Weight: 53 kg (116 lb 13.5 oz)  BSA (Calculated - sq m): 1.52 sq meters  BMI (Calculated): 21.4  Weight in (lb) to have BMI = 25: 136.4]    Physical Exam  Constitutional:       Appearance: She is well-developed.   HENT:      Head: Normocephalic and atraumatic.   Cardiovascular:      Rate and Rhythm: Normal rate and regular rhythm.      Heart sounds: Normal heart sounds. No murmur heard.  Pulmonary:      Effort: Pulmonary effort is normal. No respiratory distress.      Breath sounds: Normal breath sounds.   Abdominal:      General: There is no distension.      Palpations: Abdomen is soft.      Tenderness: There is no abdominal tenderness. There is no guarding.   Skin:     General: Skin is warm and dry.   Neurological:      Mental Status: She is alert. Mental status is at baseline.   Psychiatric:         Behavior: Behavior normal.          Laboratory:  CBC:  Recent Labs   Lab Result Units 09/05/23  1211 " 11/27/23  0758   WBC K/uL 5.68 5.94   RBC M/uL 4.62 4.60   Hemoglobin g/dL 13.5 13.9   Hematocrit % 41.7 41.1   Platelets K/uL 203 182   MCV fL 90 89   MCH pg 29.2 30.2   MCHC g/dL 32.4 33.8       CMP:  Recent Labs   Lab Result Units 09/05/23  1211 11/27/23  0758   Glucose mg/dL 86 89   Calcium mg/dL 9.1 9.5   Albumin g/dL 4.0 4.0   Total Protein g/dL 7.1 7.0   Sodium mmol/L 138 139   Potassium mmol/L 4.5 4.0   CO2 mmol/L 24 27   Chloride mmol/L 105 102   BUN mg/dL 15 15   Alkaline Phosphatase U/L 94 84   ALT U/L 12 16   AST U/L 18 21   Total Bilirubin mg/dL 0.5 0.9       URINALYSIS:  Recent Labs   Lab Result Units 10/12/23  1506   pH, UA  5.0      LIPIDS:  Recent Labs   Lab Result Units 11/27/23  0758   TSH uIU/mL 1.710   HDL mg/dL 66   Cholesterol mg/dL 192   Triglycerides mg/dL 79   LDL Cholesterol mg/dL 110.2   HDL/Cholesterol Ratio % 34.4   Non-HDL Cholesterol mg/dL 126   Total Cholesterol/HDL Ratio  2.9     TSH:  Recent Labs   Lab Result Units 11/27/23  0758   TSH uIU/mL 1.710     A1C:  Recent Labs   Lab Result Units 11/27/23  0758   Hemoglobin A1C % 5.2       Radiology:  Mammo Digital Screening Bilat w/ Oren    Result Date: 3/24/2022  Result:  Mammo Digital Screening Bilat w/ Oren    History:  Patient is 61 y.o. and is seen for a screening mammogram.      Films Compared:  Compared to: 02/26/2021 Mammo Digital Screening Bilat w/ Oren and   02/17/2020 Mammo Digital Screening Bilat w/ Oren     Findings:   This procedure was performed using tomosynthesis.   Computer-aided detection was utilized in the interpretation of this   examination.    The breasts are heterogeneously dense, which may obscure small masses.   There is no evidence of suspicious masses, microcalcifications or   architectural distortion.           Assessment/Plan     Sarahy Ma is a 63 y.o.female with:    1. History of tongue cancer    2. Essential hypertension  - amLODIPine (NORVASC) 5 MG tablet; Take 1 tablet (5 mg total) by mouth  once daily.  Dispense: 90 tablet; Refill: 3    3. Degenerative disc disease, lumbar    4. Recurrent UTI    5. Postablative hypothyroidism    6. Other ulcerative colitis without complication    7. Personal history of nicotine dependence  - CT Chest Lung Screening Low Dose; Future      -counseled on Shingrix and RSV vaccine  -counseled on scheduling urogram for hematuria workup  -Continue current medications and maintain follow up with specialists.    -No follow-ups on file.       Barb Dixon MD  [facility]

## 2023-12-07 ENCOUNTER — E-VISIT (OUTPATIENT)
Dept: PRIMARY CARE CLINIC | Facility: CLINIC | Age: 63
End: 2023-12-07
Payer: COMMERCIAL

## 2023-12-07 ENCOUNTER — PATIENT MESSAGE (OUTPATIENT)
Dept: PRIMARY CARE CLINIC | Facility: CLINIC | Age: 63
End: 2023-12-07
Payer: COMMERCIAL

## 2023-12-07 DIAGNOSIS — N30.00 ACUTE CYSTITIS WITHOUT HEMATURIA: Primary | ICD-10-CM

## 2023-12-07 PROCEDURE — 99421 OL DIG E/M SVC 5-10 MIN: CPT | Mod: ,,, | Performed by: INTERNAL MEDICINE

## 2023-12-07 PROCEDURE — 99421 PR E&M, ONLINE DIGIT, EST, < 7 DAYS, 5-10 MINS: ICD-10-PCS | Mod: ,,, | Performed by: INTERNAL MEDICINE

## 2023-12-07 RX ORDER — NITROFURANTOIN 25; 75 MG/1; MG/1
100 CAPSULE ORAL 2 TIMES DAILY
Qty: 10 CAPSULE | Refills: 0 | Status: SHIPPED | OUTPATIENT
Start: 2023-12-07 | End: 2024-02-21

## 2023-12-07 NOTE — PROGRESS NOTES
Patient ID: Sarahy Ma is a 63 y.o. female.    Chief Complaint: Urinary Tract Infection (Entered automatically based on patient selection in Patient Portal.)    The patient initiated a request through [employer] on 12/7/2023 for evaluation and management with a chief complaint of Urinary Tract Infection (Entered automatically based on patient selection in Patient Portal.)     I evaluated the questionnaire submission on 12/7/2023.    [facility] Peq Evisit Uti Questionnaire    12/7/2023  9:22 AM CST - Filed by Patient   Do you agree to participate in an E-Visit? Yes   If you have any of the following problems, please present to your local ER or call 911:  I acknowledge   What is the main issue that you would like for your doctor to address today? Extreme burning and frequent urination   Are you able to take your vital signs? No   What symptoms do you currently have? Pain while passing urine;  Change in urine appearance or smell   When did your symptoms first appear? 12/7/2023   List what you have done or taken to help your symptoms. Drank water   Please indicate whether you have had the following symptoms during the past 24 hours     Urgent urination (a sudden and uncontrollable urge to urinate) Moderate   Frequent urination of small amounts of urine (going to the toilet very often) Severe   Burning pain when urinating Severe   Incomplete bladder emptying (still feel like you need to urinate again after urination) Mild   Pain not associated with urination in the lower abdomen below the belly button) Mild   What does your urine look like? Cloudy   Blood seen in the urine None   Flank pain (pain in one or both sides of the lower back) Moderate   Abnormal Vaginal Discharge (abnormal amount, color and/or odor) None   Discharge from the urethra (urinary opening) without urination None   High body temperature/fever? None-<99.5   Please rate how much discomfort you have experience because of the symptoms in the past 24 hours:  Moderate   Please indicate how the symptoms have interfered with your every day activities/work in the past 24 hours: Moderate   Please indicate how these symptoms have interfered with your social activities (visiting people, meeting with friends, etc.) in the past 24 hours? Mild   Are you a diabetic? No   Please indicate whether you have the following at the time of completion of this questionnaire: None of the above   Provide any information you feel is important to your history not asked above    Please attach any relevant images or files (if you have performed a home test for UTI, please submit a photo of results)          Recent Labs Obtained:  No visits with results within 7 Day(s) from this visit.   Latest known visit with results is:   Lab Visit on 11/27/2023   Component Date Value Ref Range Status    WBC 11/27/2023 5.94  3.90 - 12.70 K/uL Final    RBC 11/27/2023 4.60  4.00 - 5.40 M/uL Final    Hemoglobin 11/27/2023 13.9  12.0 - 16.0 g/dL Final    Hematocrit 11/27/2023 41.1  37.0 - 48.5 % Final    MCV 11/27/2023 89  82 - 98 fL Final    MCH 11/27/2023 30.2  27.0 - 31.0 pg Final    MCHC 11/27/2023 33.8  32.0 - 36.0 g/dL Final    RDW 11/27/2023 12.2  11.5 - 14.5 % Final    Platelets 11/27/2023 182  150 - 450 K/uL Final    MPV 11/27/2023 9.2  9.2 - 12.9 fL Final    Immature Granulocytes 11/27/2023 0.2  0.0 - 0.5 % Final    Gran # (ANC) 11/27/2023 4.3  1.8 - 7.7 K/uL Final    Immature Grans (Abs) 11/27/2023 0.01  0.00 - 0.04 K/uL Final    Comment: Mild elevation in immature granulocytes is non specific and   can be seen in a variety of conditions including stress response,   acute inflammation, trauma and pregnancy. Correlation with other   laboratory and clinical findings is essential.      Lymph # 11/27/2023 0.9 (L)  1.0 - 4.8 K/uL Final    Mono # 11/27/2023 0.6  0.3 - 1.0 K/uL Final    Eos # 11/27/2023 0.1  0.0 - 0.5 K/uL Final    Baso # 11/27/2023 0.02  0.00 - 0.20 K/uL Final    nRBC 11/27/2023 0  0 /100 WBC  Final    Gran % 11/27/2023 72.9  38.0 - 73.0 % Final    Lymph % 11/27/2023 14.5 (L)  18.0 - 48.0 % Final    Mono % 11/27/2023 9.9  4.0 - 15.0 % Final    Eosinophil % 11/27/2023 2.2  0.0 - 8.0 % Final    Basophil % 11/27/2023 0.3  0.0 - 1.9 % Final    Differential Method 11/27/2023 Automated   Final    Cholesterol 11/27/2023 192  120 - 199 mg/dL Final    Comment: The National Cholesterol Education Program (NCEP) has set the  following guidelines (reference ranges) for Cholesterol:  Optimal.....................<200 mg/dL  Borderline High.............200-239 mg/dL  High........................> or = 240 mg/dL      Triglycerides 11/27/2023 79  30 - 150 mg/dL Final    Comment: The National Cholesterol Education Program (NCEP) has set the  following guidelines (reference values) for triglycerides:  Normal......................<150 mg/dL  Borderline High.............150-199 mg/dL  High........................200-499 mg/dL      HDL 11/27/2023 66  40 - 75 mg/dL Final    Comment: The National Cholesterol Education Program (NCEP) has set the  following guidelines (reference values) for HDL Cholesterol:  Low...............<40 mg/dL  Optimal...........>60 mg/dL      LDL Cholesterol 11/27/2023 110.2  63.0 - 159.0 mg/dL Final    Comment: The National Cholesterol Education Program (NCEP) has set the  following guidelines (reference values) for LDL Cholesterol:  Optimal.......................<130 mg/dL  Borderline High...............130-159 mg/dL  High..........................160-189 mg/dL  Very High.....................>190 mg/dL      HDL/Cholesterol Ratio 11/27/2023 34.4  20.0 - 50.0 % Final    Total Cholesterol/HDL Ratio 11/27/2023 2.9  2.0 - 5.0 Final    Non-HDL Cholesterol 11/27/2023 126  mg/dL Final    Comment: Risk category and Non-HDL cholesterol goals:  Coronary heart disease (CHD)or equivalent (10-year risk of CHD >20%):  Non-HDL cholesterol goal     <130 mg/dL  Two or more CHD risk factors and 10-year risk of CHD <=  20%:  Non-HDL cholesterol goal     <160 mg/dL  0 to 1 CHD risk factor:  Non-HDL cholesterol goal     <190 mg/dL      Sodium 11/27/2023 139  136 - 145 mmol/L Final    Potassium 11/27/2023 4.0  3.5 - 5.1 mmol/L Final    Chloride 11/27/2023 102  95 - 110 mmol/L Final    CO2 11/27/2023 27  23 - 29 mmol/L Final    Glucose 11/27/2023 89  70 - 110 mg/dL Final    BUN 11/27/2023 15  7 - 17 mg/dL Final    Creatinine 11/27/2023 0.96  0.50 - 1.40 mg/dL Final    Calcium 11/27/2023 9.5  8.7 - 10.5 mg/dL Final    Total Protein 11/27/2023 7.0  6.0 - 8.4 g/dL Final    Albumin 11/27/2023 4.0  3.5 - 5.2 g/dL Final    Total Bilirubin 11/27/2023 0.9  0.1 - 1.0 mg/dL Final    Comment: For infants and newborns, interpretation of results should be based  on gestational age, weight and in agreement with clinical  observations.    Premature Infant recommended reference ranges:  Up to 24 hours.............<8.0 mg/dL  Up to 48 hours............<12.0 mg/dL  3-5 days..................<15.0 mg/dL  6-29 days.................<15.0 mg/dL      Alkaline Phosphatase 11/27/2023 84  38 - 126 U/L Final    AST 11/27/2023 21  15 - 46 U/L Final    ALT 11/27/2023 16  10 - 44 U/L Final    Anion Gap 11/27/2023 10  8 - 16 mmol/L Final    eGFR 11/27/2023 >60.0  >60 mL/min/1.73 m^2 Final    Hemoglobin A1C 11/27/2023 5.2  4.0 - 5.6 % Final    Comment: ADA Screening Guidelines:  5.7-6.4%  Consistent with prediabetes  >or=6.5%  Consistent with diabetes    High levels of fetal hemoglobin interfere with the HbA1C  assay. Heterozygous hemoglobin variants (HbS, HgC, etc)do  not significantly interfere with this assay.   However, presence of multiple variants may affect accuracy.      Estimated Avg Glucose 11/27/2023 103  68 - 131 mg/dL Final    TSH 11/27/2023 1.710  0.400 - 4.000 uIU/mL Final    Comment: Warning:  Heterophilic antibodies in serum or plasma of   certain individuals are known to cause interference with   immunoassays. These antibodies may be present in  blood samples   from individuals regularly exposed to animal or who have been   treated with animal products.     Patients taking high doses of supplemental biotin may have  negatively biased results.       Free T4 11/27/2023 1.04  0.71 - 1.51 ng/dL Final       Encounter Diagnosis   Name Primary?    Acute cystitis without hematuria Yes        No orders of the defined types were placed in this encounter.     Medications Ordered This Encounter   Medications    nitrofurantoin, macrocrystal-monohydrate, (MACROBID) 100 MG capsule     Sig: Take 1 capsule (100 mg total) by mouth 2 (two) times daily.     Dispense:  10 capsule     Refill:  0        No follow-ups on file.      E-Visit Time Tracking:

## 2023-12-12 ENCOUNTER — OFFICE VISIT (OUTPATIENT)
Dept: URGENT CARE | Facility: CLINIC | Age: 63
End: 2023-12-12
Payer: COMMERCIAL

## 2023-12-12 VITALS
HEIGHT: 62 IN | SYSTOLIC BLOOD PRESSURE: 109 MMHG | HEART RATE: 84 BPM | WEIGHT: 116 LBS | DIASTOLIC BLOOD PRESSURE: 75 MMHG | BODY MASS INDEX: 21.35 KG/M2 | OXYGEN SATURATION: 97 % | TEMPERATURE: 98 F | RESPIRATION RATE: 18 BRPM

## 2023-12-12 DIAGNOSIS — J20.8 ACUTE BACTERIAL BRONCHITIS: ICD-10-CM

## 2023-12-12 DIAGNOSIS — B96.89 ACUTE BACTERIAL BRONCHITIS: ICD-10-CM

## 2023-12-12 DIAGNOSIS — J32.9 SINUSITIS, UNSPECIFIED CHRONICITY, UNSPECIFIED LOCATION: Primary | ICD-10-CM

## 2023-12-12 PROCEDURE — 99213 OFFICE O/P EST LOW 20 MIN: CPT | Mod: S$GLB,,, | Performed by: PHYSICIAN ASSISTANT

## 2023-12-12 PROCEDURE — 99213 PR OFFICE/OUTPT VISIT, EST, LEVL III, 20-29 MIN: ICD-10-PCS | Mod: S$GLB,,, | Performed by: PHYSICIAN ASSISTANT

## 2023-12-12 RX ORDER — CROMOLYN SODIUM 5.2 MG/ML
1 SPRAY, METERED NASAL 4 TIMES DAILY
Qty: 26 ML | Refills: 1 | Status: SHIPPED | OUTPATIENT
Start: 2023-12-12 | End: 2024-02-21

## 2023-12-12 RX ORDER — ALBUTEROL SULFATE 90 UG/1
2 AEROSOL, METERED RESPIRATORY (INHALATION) EVERY 6 HOURS PRN
Qty: 18 G | Refills: 0 | Status: SHIPPED | OUTPATIENT
Start: 2023-12-12 | End: 2024-12-11

## 2023-12-12 RX ORDER — AZITHROMYCIN 250 MG/1
TABLET, FILM COATED ORAL
Qty: 6 TABLET | Refills: 0 | Status: SHIPPED | OUTPATIENT
Start: 2023-12-12 | End: 2023-12-17

## 2023-12-12 RX ORDER — VITAMIN A 3000 MCG
1 CAPSULE ORAL
Qty: 60 ML | Refills: 12 | Status: SHIPPED | OUTPATIENT
Start: 2023-12-12 | End: 2024-02-21

## 2023-12-12 RX ORDER — AZELASTINE 1 MG/ML
1 SPRAY, METERED NASAL 2 TIMES DAILY
Qty: 30 ML | Refills: 0 | Status: SHIPPED | OUTPATIENT
Start: 2023-12-12 | End: 2024-02-21

## 2023-12-12 RX ORDER — GUAIFENESIN/DEXTROMETHORPHAN 100-10MG/5
5 SYRUP ORAL EVERY 4 HOURS PRN
Qty: 236 ML | Refills: 0 | Status: SHIPPED | OUTPATIENT
Start: 2023-12-12 | End: 2023-12-22

## 2023-12-12 NOTE — PROGRESS NOTES
"Subjective:      Patient ID: Sarahy aM is a 63 y.o. female.    Vitals:  height is 5' 2" (1.575 m) and weight is 52.6 kg (116 lb). Her oral temperature is 98.2 °F (36.8 °C). Her blood pressure is 109/75 and her pulse is 84. Her respiration is 18 and oxygen saturation is 97%.     Chief Complaint: Sinus Problem    Pt states that she had been sick since November 28. States that she is having sinus symptoms that won't go away.  She complains of  blowing thick green discharge from the nose and coughing up thick green sputum    Sinus Problem  This is a new problem. The current episode started 1 to 4 weeks ago. The problem is unchanged. There has been no fever. Her pain is at a severity of 3/10. The pain is mild. Associated symptoms include congestion, coughing, headaches, sinus pressure and a sore throat. Treatments tried: coricidin HPB. The treatment provided no relief.       HENT:  Positive for congestion, sinus pressure and sore throat.    Respiratory:  Positive for cough.    Skin:  Negative for erythema.   Neurological:  Positive for headaches.      Objective:     Physical Exam   Constitutional: She is oriented to person, place, and time. She appears well-developed. She is cooperative.  Non-toxic appearance. She does not appear ill. No distress.   HENT:   Head: Normocephalic and atraumatic.   Ears:   Right Ear: Hearing, tympanic membrane, external ear and ear canal normal.   Left Ear: Hearing, tympanic membrane, external ear and ear canal normal.   Nose: Nose normal. No mucosal edema, rhinorrhea or nasal deformity. No epistaxis. Right sinus exhibits no maxillary sinus tenderness and no frontal sinus tenderness. Left sinus exhibits no maxillary sinus tenderness and no frontal sinus tenderness.   Mouth/Throat: Uvula is midline, oropharynx is clear and moist and mucous membranes are normal. No trismus in the jaw. Normal dentition. No uvula swelling. No oropharyngeal exudate, posterior oropharyngeal edema or " posterior oropharyngeal erythema.   Eyes: Conjunctivae, EOM and lids are normal. Pupils are equal, round, and reactive to light. Right eye exhibits no discharge. Left eye exhibits no discharge. No scleral icterus.   Neck: Trachea normal and phonation normal. Neck supple. No JVD present. No tracheal deviation present. No thyromegaly present. No edema present. No erythema present. No neck rigidity present.   Cardiovascular: Normal rate, regular rhythm, normal heart sounds and normal pulses.   No murmur heard.Exam reveals no gallop and no friction rub.   Pulmonary/Chest: Effort normal and breath sounds normal. No stridor. No respiratory distress. She has no decreased breath sounds. She has no wheezes. She has no rhonchi. She has no rales. She exhibits no tenderness.   Abdominal: Normal appearance. She exhibits no distension. Soft. There is no abdominal tenderness. There is no rebound and no guarding.   Musculoskeletal: Normal range of motion.         General: No deformity. Normal range of motion.   Neurological: She is alert and oriented to person, place, and time. She exhibits normal muscle tone. Coordination normal.   Skin: Skin is warm, dry, intact, not diaphoretic, not pale and no rash. Capillary refill takes less than 2 seconds. No erythema   Psychiatric: Her speech is normal and behavior is normal. Judgment and thought content normal.   Nursing note and vitals reviewed.      Assessment:     1. Sinusitis, unspecified chronicity, unspecified location    2. Acute bacterial bronchitis        Plan:       Sinusitis, unspecified chronicity, unspecified location  -     azelastine (ASTELIN) 137 mcg (0.1 %) nasal spray; 1 spray (137 mcg total) by Nasal route 2 (two) times daily.  Dispense: 30 mL; Refill: 0  -     cromolyn (NASALCHROM) 5.2 mg/spray (4 %) nasal spray; 1 spray by Nasal route 4 (four) times daily.  Dispense: 26 mL; Refill: 1  -     sodium chloride (SALINE NASAL) 0.65 % nasal spray; 1 spray by Nasal route as  needed for Congestion.  Dispense: 60 mL; Refill: 12  -     dextromethorphan-guaiFENesin  mg/5 ml (ROBITUSSIN-DM)  mg/5 mL liquid; Take 5 mLs by mouth every 4 (four) hours as needed (Cough).  Dispense: 236 mL; Refill: 0    Acute bacterial bronchitis  -     albuterol (VENTOLIN HFA) 90 mcg/actuation inhaler; Inhale 2 puffs into the lungs every 6 (six) hours as needed for Wheezing. Rescue  Dispense: 18 g; Refill: 0  -     azithromycin (Z-RICARDO) 250 MG tablet; Take 2 tablets by mouth on day 1; Take 1 tablet by mouth on days 2-5  Dispense: 6 tablet; Refill: 0

## 2023-12-17 DIAGNOSIS — J31.0 CHRONIC RHINITIS: ICD-10-CM

## 2023-12-17 RX ORDER — FLUTICASONE PROPIONATE 50 MCG
2 SPRAY, SUSPENSION (ML) NASAL
Qty: 16 ML | Refills: 11 | Status: SHIPPED | OUTPATIENT
Start: 2023-12-17

## 2024-01-18 ENCOUNTER — PATIENT MESSAGE (OUTPATIENT)
Dept: PRIMARY CARE CLINIC | Facility: CLINIC | Age: 64
End: 2024-01-18
Payer: COMMERCIAL

## 2024-02-19 PROBLEM — N39.0 RECURRENT UTI: Status: RESOLVED | Noted: 2023-10-20 | Resolved: 2024-02-19

## 2024-02-21 ENCOUNTER — OFFICE VISIT (OUTPATIENT)
Dept: URGENT CARE | Facility: CLINIC | Age: 64
End: 2024-02-21
Payer: COMMERCIAL

## 2024-02-21 VITALS
WEIGHT: 116 LBS | DIASTOLIC BLOOD PRESSURE: 68 MMHG | OXYGEN SATURATION: 95 % | HEART RATE: 92 BPM | HEIGHT: 62 IN | BODY MASS INDEX: 21.35 KG/M2 | TEMPERATURE: 98 F | SYSTOLIC BLOOD PRESSURE: 100 MMHG | RESPIRATION RATE: 19 BRPM

## 2024-02-21 DIAGNOSIS — H65.193 ACUTE EFFUSION OF BOTH MIDDLE EARS: ICD-10-CM

## 2024-02-21 DIAGNOSIS — J06.9 VIRAL URI WITH COUGH: Primary | ICD-10-CM

## 2024-02-21 LAB
CTP QC/QA: YES
CTP QC/QA: YES
POC MOLECULAR INFLUENZA A AGN: NEGATIVE
POC MOLECULAR INFLUENZA B AGN: NEGATIVE
SARS-COV-2 AG RESP QL IA.RAPID: NEGATIVE

## 2024-02-21 PROCEDURE — 87502 INFLUENZA DNA AMP PROBE: CPT | Mod: QW,S$GLB,, | Performed by: PHYSICIAN ASSISTANT

## 2024-02-21 PROCEDURE — 99213 OFFICE O/P EST LOW 20 MIN: CPT | Mod: S$GLB,,, | Performed by: PHYSICIAN ASSISTANT

## 2024-02-21 PROCEDURE — 87811 SARS-COV-2 COVID19 W/OPTIC: CPT | Mod: QW,S$GLB,, | Performed by: PHYSICIAN ASSISTANT

## 2024-02-21 NOTE — PATIENT INSTRUCTIONS
"                                                         URI   If your condition worsens or fails to improve we recommend that you receive another evaluation at the urgent care/ER immediately or contact your PCP to discuss your concerns. You must understand that you've received an urgent care treatment only and that you may be released before all your medical problems are known or treated. You the patient will arrange for follouwp care as instructed.     If we discussed that I think your illness is viral, it will not respond to antibiotics and will last 10-14 days.     Retest for covid at home in 48 hours  Flonase (fluticasone) is a nasal spray which is available over the counter and may help with your symptoms.   Zyrtec D, Claritin D or Allegra D can also help with symptoms of congestion and drainage.   If you have hypertension avoid using the "D" which is the decongestant   If you just have drainage you can take plain zyrtec, claritin or allegra     Rest and fluids are also important.     Salt water gargles, warm tea with honey and chloraseptic spray as needed for sore throat.   Tylenol n can also be used as directed for pain unless you have an allergy to them or medical condition such as  liver disease etc for which you should not be taking these type of medications.         "

## 2024-02-21 NOTE — PROGRESS NOTES
"Subjective:      Patient ID: Sarahy Ma is a 63 y.o. female.    Vitals:  height is 5' 2" (1.575 m) and weight is 52.6 kg (116 lb). Her oral temperature is 98.1 °F (36.7 °C). Her blood pressure is 100/68 and her pulse is 92. Her respiration is 19 and oxygen saturation is 95%.     Chief Complaint: Cough (Fever & headache - Entered by patient)    Pt present with symptoms of- Cough, Headache and Sore Throat X 2 days.     Cough  This is a new problem. The current episode started in the past 7 days. The problem has been gradually worsening. The problem occurs every few minutes. The cough is Productive of sputum. Associated symptoms include chills, a fever, headaches, myalgias, postnasal drip and a sore throat. Pertinent negatives include no chest pain, ear pain, hemoptysis, nasal congestion, rash or shortness of breath. Nothing aggravates the symptoms. She has tried OTC cough suppressant for the symptoms. There is no history of asthma, bronchitis or COPD.       Constitution: Positive for appetite change, chills, sweating, fatigue and fever.   HENT:  Positive for postnasal drip and sore throat. Negative for ear pain, ear discharge, tinnitus, hearing loss, dental problem, drooling, mouth sores, tongue pain, tongue lesion, congestion, sinus pain, sinus pressure, trouble swallowing and voice change.    Neck: Positive for neck pain. Negative for neck stiffness and painful lymph nodes.   Cardiovascular:  Negative for chest pain and sob on exertion.   Eyes:  Negative for eye discharge and eye itching.   Respiratory:  Positive for cough and sputum production. Negative for chest tightness, bloody sputum and shortness of breath.    Gastrointestinal:  Negative for abdominal pain, nausea, vomiting, constipation and diarrhea.   Musculoskeletal:  Positive for muscle ache. Negative for muscle cramps.   Skin:  Negative for rash.   Neurological:  Positive for headaches. Negative for dizziness and altered mental status. "   Hematologic/Lymphatic: Negative for swollen lymph nodes.   Psychiatric/Behavioral:  Negative for altered mental status.       Past Medical History:   Diagnosis Date    Hypertension     Postablative hypothyroidism     secondary to the radiation treatment for tongue cancer - treated by oncologist    Tongue cancer     Ulcerative colitis     Treated by Dr. Tobias    Ulcerative colitis        Past Surgical History:   Procedure Laterality Date     SECTION      CHOLECYSTECTOMY  2016    removed    CHOLECYSTECTOMY  2016    COLONOSCOPY      Dr. Tobias    COLONOSCOPY N/A 2021    Procedure: COLONOSCOPY Suprep;  Surgeon: Chetan Forte MD;  Location: [facility] ENDO;  Service: Endoscopy;  Laterality: N/A;    feeding tube      groin surgery  2017    swollen lump node    HYSTERECTOMY      OOPHORECTOMY      PORTACATH PLACEMENT      TONSILLECTOMY      TRANSFORAMINAL EPIDURAL INJECTION OF STEROID Right 2021    Procedure: LUMBAR TRANSFORAMINAL RIGHT L2/3 AND L3/4 DIRECT REFERRAL ;  Surgeon: Tana Beckwith MD;  Location: [facility];  Service: Pain Management;  Laterality: Right;    TUBAL LIGATION         Family History   Problem Relation Age of Onset    Hypertension Father     Diabetes Father     Heart attack Father     Heart disease Father     Heart failure Father     Hyperlipidemia Father     Arthritis Father     Hypertension Mother     Arthritis Mother     Thyroid disease Sister     Thyroid disease Brother     Thyroid disease Sister     Thyroid disease Brother     Cancer Paternal Grandmother     Early death Brother     Stroke Neg Hx        Social History     Socioeconomic History    Marital status:    Tobacco Use    Smoking status: Former     Current packs/day: 0.00     Average packs/day: 1 pack/day for 28.7 years (28.7 ttl pk-yrs)     Types: Cigarettes     Start date: 1980     Quit date: 5/15/2009     Years since quittin.7     Passive exposure: Never    Smokeless tobacco:  Never   Substance and Sexual Activity    Alcohol use: Not Currently     Comment: occasionally    Drug use: No    Sexual activity: Not Currently     Partners: Male     Birth control/protection: Post-menopausal     Social Determinants of Health     Financial Resource Strain: Low Risk  (10/17/2023)    Overall Financial Resource Strain (CARDIA)     Difficulty of Paying Living Expenses: Not hard at all   Food Insecurity: No Food Insecurity (10/17/2023)    Hunger Vital Sign     Worried About Running Out of Food in the Last Year: Never true     Ran Out of Food in the Last Year: Never true   Transportation Needs: No Transportation Needs (10/17/2023)    PRAPARE - Transportation     Lack of Transportation (Medical): No     Lack of Transportation (Non-Medical): No   Physical Activity: Insufficiently Active (10/17/2023)    Exercise Vital Sign     Days of Exercise per Week: 2 days     Minutes of Exercise per Session: 30 min   Stress: No Stress Concern Present (10/17/2023)    [employer] - Occupational Stress Questionnaire     Feeling of Stress : Not at all   Social Connections: Unknown (10/17/2023)    Social Connection and Isolation Panel [NHANES]     Frequency of Communication with Friends and Family: More than three times a week     Frequency of Social Gatherings with Friends and Family: More than three times a week     Active Member of Clubs or Organizations: Yes     Attends Club or Organization Meetings: More than 4 times per year     Marital Status:    Housing Stability: Low Risk  (10/17/2023)    Housing Stability Vital Sign     Unable to Pay for Housing in the Last Year: No     Number of Places Lived in the Last Year: 0     Unstable Housing in the Last Year: No       Current Outpatient Medications   Medication Sig Dispense Refill    albuterol (VENTOLIN HFA) 90 mcg/actuation inhaler Inhale 2 puffs into the lungs every 6 (six) hours as needed for Wheezing. Rescue 18 g 0    amLODIPine  (NORVASC) 5 MG tablet Take 1 tablet (5 mg total) by mouth once daily. 90 tablet 3    estradioL (ESTRACE) 0.01 % (0.1 mg/gram) vaginal cream Place 1 g vaginally twice a week. 42.5 g 3    fluticasone furoate-vilanteroL (BREO ELLIPTA) 100-25 mcg/dose diskus inhaler Inhale 1 puff into the lungs once daily. Controller 180 each 2    fluticasone propionate (FLONASE) 50 mcg/actuation nasal spray SPRAY 2 SPRAYS BY EACH NOSTRIL ROUTE ONCE DAILY. 16 mL 11    gabapentin (NEURONTIN) 300 MG capsule Take 1 capsule (300 mg total) by mouth 3 (three) times daily. 90 capsule 11    meclizine (ANTIVERT) 25 mg tablet Take 1 tablet (25 mg total) by mouth 3 (three) times daily as needed for Dizziness. 20 tablet 0    mesalamine (APRISO) 0.375 gram Cp24 Take 4 capsules (1.5 g total) by mouth once daily. Can taper to 2 capsules per day if symptoms controlled 120 capsule 11    ondansetron (ZOFRAN-ODT) 4 MG TbDL Take 1 tablet (4 mg total) by mouth every 8 (eight) hours as needed (nausea). 10 tablet 0    pantoprazole (PROTONIX) 40 MG tablet TAKE 1 TABLET BY MOUTH EVERY DAY 90 tablet 2    SYNTHROID 75 mcg tablet Take 1 tablet (75 mcg total) by mouth before breakfast. 90 tablet 3     No current facility-administered medications for this visit.       Review of patient's allergies indicates:   Allergen Reactions    Amoxicillin      Other reaction(s): fever blisters    Nsaids (non-steroidal anti-inflammatory drug)      Other reaction(s): nausea, stomach issues       Objective:     Physical Exam   Constitutional: She is oriented to person, place, and time. She appears well-developed. She is cooperative.  Non-toxic appearance. She does not appear ill. No distress.   HENT:   Head: Normocephalic and atraumatic.   Ears:   Right Ear: Hearing, external ear and ear canal normal. Tympanic membrane is not injected, not erythematous, not retracted and not bulging. A middle ear effusion is present. impacted cerumen  Left Ear: Hearing, external ear and ear canal  normal. Tympanic membrane is not injected, not erythematous, not retracted and not bulging. A middle ear effusion is present. impacted cerumen  Nose: Rhinorrhea present. No mucosal edema, nasal deformity or congestion. No epistaxis. Right sinus exhibits no maxillary sinus tenderness and no frontal sinus tenderness. Left sinus exhibits no maxillary sinus tenderness and no frontal sinus tenderness.   Mouth/Throat: Uvula is midline and mucous membranes are normal. Mucous membranes are moist. No trismus in the jaw. Normal dentition. No uvula swelling. Posterior oropharyngeal erythema present. No oropharyngeal exudate or posterior oropharyngeal edema.   Eyes: Conjunctivae and lids are normal. Right eye exhibits no discharge. Left eye exhibits no discharge. No scleral icterus.   Neck: Trachea normal and phonation normal. Neck supple. No edema present. No erythema present. No neck rigidity present.   Cardiovascular: Normal rate, regular rhythm, normal heart sounds and normal pulses.   No murmur heard.Exam reveals no gallop and no friction rub.   Pulmonary/Chest: Effort normal and breath sounds normal. No stridor. No respiratory distress. She has no decreased breath sounds. She has no wheezes. She has no rhonchi. She has no rales.   Abdominal: Normal appearance.   Musculoskeletal:      Cervical back: She exhibits no tenderness.   Lymphadenopathy:     She has no cervical adenopathy.   Neurological: She is alert and oriented to person, place, and time. She exhibits normal muscle tone.   Skin: Skin is warm, dry, intact, not diaphoretic, not pale and no rash.   Psychiatric: Her speech is normal and behavior is normal. Mood, judgment and thought content normal.   Nursing note and vitals reviewed.    Results for orders placed or performed in visit on 02/21/24   SARS Coronavirus 2 Antigen, POCT Manual Read   Result Value Ref Range    SARS Coronavirus 2 Antigen Negative Negative     Acceptable Yes    POCT Influenza  "A/B MOLECULAR   Result Value Ref Range    POC Molecular Influenza A Ag Negative Negative, Not Reported    POC Molecular Influenza B Ag Negative Negative, Not Reported     Acceptable Yes        Assessment:     1. Viral URI with cough    2. Acute effusion of both middle ears        Plan:       Viral URI with cough  -     SARS Coronavirus 2 Antigen, POCT Manual Read  -     POCT Influenza A/B MOLECULAR    Acute effusion of both middle ears           Results reviewed  I have reviewed the patient chart and pertinent past imaging/labs.     Pt declined tessalon perles  Patient Instructions                                                            URI   If your condition worsens or fails to improve we recommend that you receive another evaluation at the urgent care/ER immediately or contact your PCP to discuss your concerns. You must understand that you've received an urgent care treatment only and that you may be released before all your medical problems are known or treated. You the patient will arrange for follouwp care as instructed.     If we discussed that I think your illness is viral, it will not respond to antibiotics and will last 10-14 days.     Retest for covid at home in 48 hours  Flonase (fluticasone) is a nasal spray which is available over the counter and may help with your symptoms.   Zyrtec D, Claritin D or Allegra D can also help with symptoms of congestion and drainage.   If you have hypertension avoid using the "D" which is the decongestant   If you just have drainage you can take plain zyrtec, claritin or allegra     Rest and fluids are also important.     Salt water gargles, warm tea with honey and chloraseptic spray as needed for sore throat.   Tylenol n can also be used as directed for pain unless you have an allergy to them or medical condition such as  liver disease etc for which you should not be taking these type of medications.                 "

## 2024-02-27 ENCOUNTER — OFFICE VISIT (OUTPATIENT)
Dept: INTERNAL MEDICINE | Facility: CLINIC | Age: 64
End: 2024-02-27
Payer: COMMERCIAL

## 2024-02-27 VITALS
DIASTOLIC BLOOD PRESSURE: 55 MMHG | SYSTOLIC BLOOD PRESSURE: 102 MMHG | WEIGHT: 118.19 LBS | BODY MASS INDEX: 21.61 KG/M2

## 2024-02-27 DIAGNOSIS — J01.90 ACUTE BACTERIAL SINUSITIS: Primary | ICD-10-CM

## 2024-02-27 DIAGNOSIS — B96.89 ACUTE BACTERIAL SINUSITIS: Primary | ICD-10-CM

## 2024-02-27 PROCEDURE — 99999 PR PBB SHADOW E&M-EST. PATIENT-LVL III: CPT | Mod: PBBFAC,,,

## 2024-02-27 PROCEDURE — 3008F BODY MASS INDEX DOCD: CPT | Mod: CPTII,S$GLB,,

## 2024-02-27 PROCEDURE — 1159F MED LIST DOCD IN RCRD: CPT | Mod: CPTII,S$GLB,,

## 2024-02-27 PROCEDURE — 3078F DIAST BP <80 MM HG: CPT | Mod: CPTII,S$GLB,,

## 2024-02-27 PROCEDURE — 3074F SYST BP LT 130 MM HG: CPT | Mod: CPTII,S$GLB,,

## 2024-02-27 PROCEDURE — 1160F RVW MEDS BY RX/DR IN RCRD: CPT | Mod: CPTII,S$GLB,,

## 2024-02-27 PROCEDURE — 99213 OFFICE O/P EST LOW 20 MIN: CPT | Mod: S$GLB,,,

## 2024-02-27 RX ORDER — DOXYCYCLINE HYCLATE 100 MG
100 TABLET ORAL 2 TIMES DAILY
Qty: 10 TABLET | Refills: 0 | Status: SHIPPED | OUTPATIENT
Start: 2024-02-27 | End: 2024-03-03

## 2024-02-27 NOTE — PATIENT INSTRUCTIONS
Take 1/2 of Amlodipine daily and check BP. If BP is persistently over >125/85 then can resume Amlodipine 5mg as normal.

## 2024-02-27 NOTE — PROGRESS NOTES
Subjective     Chief Complaint: Sinus Infection    History of Present Illness:  Ms. Sarahy Ma is a 63 y.o. female w/hx of HTN, Ulcerative colitis, and tongue cancer (2011) who presents to clinic for urgent care visit for persistent cough, nasal congestion, and sinus pressure consistent with a sinus infection. She reports the symptoms started ~2 weeks ago. She first started having the cough (intermittently dry vs productive) which progressed to subjective fevers, sinus pressure, sore throat, headaches, chills, and nasal congestion. She said she initially had these symptoms back in November 2023 which persisted for over 4 weeks. She went to urgent care the first time in December 2023. At that time she was prescribed Astelin spray, cromolyn spray, robitussin, and Flonase. She initially improved but her symptoms re-appeared a few weeks ago. She went to urgent care again on 2/21. She was negative for Flu or COVID at that time. Prescribed medications for symptomatic control. Denies any recent known sick contacts. She's a retired . She recently travelled to [place] and [place] for a conference.         Review of Systems   Constitutional:  Negative for chills, malaise/fatigue and weight loss.   HENT:  Positive for congestion, sinus pain and sore throat. Negative for ear pain and hearing loss.    Eyes:  Negative for blurred vision and redness.   Respiratory:  Positive for cough and sputum production. Negative for shortness of breath.    Cardiovascular:  Negative for chest pain, palpitations and leg swelling.   Gastrointestinal:  Negative for abdominal pain, constipation, diarrhea, nausea and vomiting.   Genitourinary:  Negative for dysuria, flank pain and hematuria.   Musculoskeletal:  Negative for myalgias and neck pain.   Neurological:  Negative for weakness and headaches.   Endo/Heme/Allergies:  Does not bruise/bleed easily.   Psychiatric/Behavioral:  Negative for depression and  substance abuse. The patient is not nervous/anxious.        PAST HISTORY:     Past Medical History:   Diagnosis Date    Hypertension     Postablative hypothyroidism     secondary to the radiation treatment for tongue cancer - treated by oncologist    Tongue cancer     Ulcerative colitis     Treated by Dr. Tobias    Ulcerative colitis        Past Surgical History:   Procedure Laterality Date     SECTION      CHOLECYSTECTOMY  2016    removed    CHOLECYSTECTOMY  2016    COLONOSCOPY      Dr. Tobias    COLONOSCOPY N/A 2021    Procedure: COLONOSCOPY Suprep;  Surgeon: Chetan Forte MD;  Location: [facility] ENDO;  Service: Endoscopy;  Laterality: N/A;    feeding tube      groin surgery  2017    swollen lump node    HYSTERECTOMY      OOPHORECTOMY      PORTACATH PLACEMENT      TONSILLECTOMY      TRANSFORAMINAL EPIDURAL INJECTION OF STEROID Right 2021    Procedure: LUMBAR TRANSFORAMINAL RIGHT L2/3 AND L3/4 DIRECT REFERRAL ;  Surgeon: Tana Beckwith MD;  Location: [facility] PAIN MGT;  Service: Pain Management;  Laterality: Right;    TUBAL LIGATION         Family History   Problem Relation Age of Onset    Hypertension Father     Diabetes Father     Heart attack Father     Heart disease Father     Heart failure Father     Hyperlipidemia Father     Arthritis Father     Hypertension Mother     Arthritis Mother     Thyroid disease Sister     Thyroid disease Brother     Thyroid disease Sister     Thyroid disease Brother     Cancer Paternal Grandmother     Early death Brother     Stroke Neg Hx        Social History     Socioeconomic History    Marital status:    Tobacco Use    Smoking status: Former     Current packs/day: 0.00     Average packs/day: 1 pack/day for 28.7 years (28.7 ttl pk-yrs)     Types: Cigarettes     Start date: 1980     Quit date: 5/15/2009     Years since quittin.7     Passive exposure: Never    Smokeless tobacco: Never   Substance and Sexual Activity    Alcohol  use: Not Currently     Comment: occasionally    Drug use: No    Sexual activity: Not Currently     Partners: Male     Birth control/protection: Post-menopausal     Social Determinants of Health     Financial Resource Strain: Low Risk  (2/27/2024)    Overall Financial Resource Strain (CARDIA)     Difficulty of Paying Living Expenses: Not hard at all   Food Insecurity: No Food Insecurity (2/27/2024)    Hunger Vital Sign     Worried About Running Out of Food in the Last Year: Never true     Ran Out of Food in the Last Year: Never true   Transportation Needs: No Transportation Needs (2/27/2024)    PRAPARE - Transportation     Lack of Transportation (Medical): No     Lack of Transportation (Non-Medical): No   Physical Activity: Sufficiently Active (2/27/2024)    Exercise Vital Sign     Days of Exercise per Week: 5 days     Minutes of Exercise per Session: 30 min   Stress: No Stress Concern Present (2/27/2024)    [employer] - Occupational Stress Questionnaire     Feeling of Stress : Not at all   Social Connections: Unknown (2/27/2024)    Social Connection and Isolation Panel [NHANES]     Frequency of Communication with Friends and Family: More than three times a week     Frequency of Social Gatherings with Friends and Family: More than three times a week     Active Member of Clubs or Organizations: Yes     Attends Club or Organization Meetings: More than 4 times per year     Marital Status:    Housing Stability: Low Risk  (2/27/2024)    Housing Stability Vital Sign     Unable to Pay for Housing in the Last Year: No     Number of Places Lived in the Last Year: 1     Unstable Housing in the Last Year: No       MEDICATIONS & ALLERGIES:     Current Outpatient Medications on File Prior to Visit   Medication Sig    albuterol (VENTOLIN HFA) 90 mcg/actuation inhaler Inhale 2 puffs into the lungs every 6 (six) hours as needed for Wheezing. Rescue    amLODIPine (NORVASC) 5 MG tablet Take 1  tablet (5 mg total) by mouth once daily.    estradioL (ESTRACE) 0.01 % (0.1 mg/gram) vaginal cream Place 1 g vaginally twice a week.    fluticasone furoate-vilanteroL (BREO ELLIPTA) 100-25 mcg/dose diskus inhaler Inhale 1 puff into the lungs once daily. Controller    fluticasone propionate (FLONASE) 50 mcg/actuation nasal spray SPRAY 2 SPRAYS BY EACH NOSTRIL ROUTE ONCE DAILY.    gabapentin (NEURONTIN) 300 MG capsule Take 1 capsule (300 mg total) by mouth 3 (three) times daily.    meclizine (ANTIVERT) 25 mg tablet Take 1 tablet (25 mg total) by mouth 3 (three) times daily as needed for Dizziness.    mesalamine (APRISO) 0.375 gram Cp24 Take 4 capsules (1.5 g total) by mouth once daily. Can taper to 2 capsules per day if symptoms controlled    ondansetron (ZOFRAN-ODT) 4 MG TbDL Take 1 tablet (4 mg total) by mouth every 8 (eight) hours as needed (nausea).    pantoprazole (PROTONIX) 40 MG tablet TAKE 1 TABLET BY MOUTH EVERY DAY    SYNTHROID 75 mcg tablet Take 1 tablet (75 mcg total) by mouth before breakfast.     No current facility-administered medications on file prior to visit.       Review of patient's allergies indicates:   Allergen Reactions    Amoxicillin      Other reaction(s): fever blisters    Nsaids (non-steroidal anti-inflammatory drug)      Other reaction(s): nausea, stomach issues       OBJECTIVE:     Vital Signs:  Vitals:    02/27/24 1333   BP: (!) 102/55   BP Location: Right arm   Patient Position: Sitting   BP Method: Small (Manual)   Weight: 53.6 kg (118 lb 2.7 oz)       Body mass index is 21.61 kg/m².     Physical Exam  Constitutional:       General: She is not in acute distress.     Appearance: Normal appearance. She is normal weight. She is not ill-appearing.   HENT:      Head: Normocephalic.      Nose: Congestion present. No rhinorrhea.      Mouth/Throat:      Pharynx: No posterior oropharyngeal erythema.   Eyes:      General: No scleral icterus.        Right eye: No discharge.         Left eye: No  discharge.   Cardiovascular:      Rate and Rhythm: Normal rate and regular rhythm.      Pulses: Normal pulses.      Heart sounds: Normal heart sounds. No murmur heard.     No friction rub. No gallop.   Pulmonary:      Effort: Pulmonary effort is normal. No respiratory distress.      Breath sounds: Normal breath sounds.   Abdominal:      General: Abdomen is flat. Bowel sounds are normal. There is no distension.      Palpations: Abdomen is soft.      Tenderness: There is no abdominal tenderness.   Musculoskeletal:         General: No swelling or tenderness.      Cervical back: Normal range of motion. No rigidity or tenderness.      Right lower leg: No edema.      Left lower leg: No edema.   Skin:     General: Skin is warm.      Findings: No bruising or erythema.   Neurological:      Mental Status: She is alert and oriented to person, place, and time. Mental status is at baseline.   Psychiatric:         Mood and Affect: Mood normal.         Behavior: Behavior normal.       Laboratory  No results found for this or any previous visit (from the past 24 hour(s)).    Diagnostic Results:      Health Maintenance Due   Topic Date Due    Pneumococcal Vaccines (Age 0-64) (1 of 2 - PCV) Never done    Shingles Vaccine (1 of 2) Never done    RSV Vaccine (Age 60+ and Pregnant patients) (1 - 1-dose 60+ series) Never done    LDCT Lung Screen  05/27/2022    COVID-19 Vaccine (3 - 2023-24 season) 09/01/2023         ASSESSMENT & PLAN:     Acute bacterial sinusitis  -     doxycycline (VIBRA-TABS) 100 MG tablet; Take 1 tablet (100 mg total) by mouth 2 (two) times daily. for 5 days  Dispense: 10 tablet; Refill: 0      Failed conservative management, concern for recurrent acute rhinosinusitis. Will prescribe 5-day course of Doxycyline in addition to the intranasal steroids to accelerate recovery. Gave instructions on how to properly use Flonase for adequate congestion relief. Also her BP has been persistently low (SBP low 100s). We discussed  doing a trial of Amlodipine 2.5mg (told her to half her current 5mg) and check her BP daily for better BP control.     See above for further detail.    RTC in 1mo     Discussed with Dr May  - staff attestation to follow      Sophia Sy M.D.   Internal Medicine PGY1

## 2024-02-28 RX ORDER — PANTOPRAZOLE SODIUM 40 MG/1
TABLET, DELAYED RELEASE ORAL
Qty: 90 TABLET | Refills: 3 | Status: SHIPPED | OUTPATIENT
Start: 2024-02-28

## 2024-02-29 NOTE — PROGRESS NOTES
I have reviewed and concur with the resident's history, assessment, and plan.       Braden May MD  Family Medicine  [facility]  02/29/2024

## 2024-03-09 ENCOUNTER — PATIENT MESSAGE (OUTPATIENT)
Dept: GASTROENTEROLOGY | Facility: CLINIC | Age: 64
End: 2024-03-09
Payer: COMMERCIAL

## 2024-03-14 ENCOUNTER — OFFICE VISIT (OUTPATIENT)
Dept: OTOLARYNGOLOGY | Facility: CLINIC | Age: 64
End: 2024-03-14
Payer: COMMERCIAL

## 2024-03-14 VITALS
BODY MASS INDEX: 20.97 KG/M2 | SYSTOLIC BLOOD PRESSURE: 126 MMHG | HEART RATE: 97 BPM | WEIGHT: 114.63 LBS | DIASTOLIC BLOOD PRESSURE: 86 MMHG

## 2024-03-14 DIAGNOSIS — K11.7 XEROSTOMIA DUE TO RADIOTHERAPY: ICD-10-CM

## 2024-03-14 DIAGNOSIS — K51.919 ULCERATIVE COLITIS WITH COMPLICATION, UNSPECIFIED LOCATION: ICD-10-CM

## 2024-03-14 DIAGNOSIS — J01.00 SUBACUTE MAXILLARY SINUSITIS: Primary | ICD-10-CM
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
## 2024-03-14 DIAGNOSIS — J01.00 ACUTE NON-RECURRENT MAXILLARY SINUSITIS: ICD-10-CM

## 2024-03-14 DIAGNOSIS — R49.0 DYSPHONIA: ICD-10-CM

## 2024-03-14 DIAGNOSIS — J38.4 VOCAL FOLD EDEMA: ICD-10-CM

## 2024-03-14 PROCEDURE — 87070 CULTURE OTHR SPECIMN AEROBIC: CPT | Performed by: PHYSICIAN ASSISTANT

## 2024-03-14 PROCEDURE — 31575 DIAGNOSTIC LARYNGOSCOPY: CPT | Mod: S$GLB,,, | Performed by: PHYSICIAN ASSISTANT

## 2024-03-14 PROCEDURE — 87075 CULTR BACTERIA EXCEPT BLOOD: CPT | Performed by: PHYSICIAN ASSISTANT

## 2024-03-14 PROCEDURE — 3008F BODY MASS INDEX DOCD: CPT | Mod: CPTII,S$GLB,, | Performed by: PHYSICIAN ASSISTANT

## 2024-03-14 PROCEDURE — 99999 PR PBB SHADOW E&M-EST. PATIENT-LVL IV: CPT | Mod: PBBFAC,,, | Performed by: PHYSICIAN ASSISTANT

## 2024-03-14 PROCEDURE — 87077 CULTURE AEROBIC IDENTIFY: CPT | Mod: 59 | Performed by: PHYSICIAN ASSISTANT

## 2024-03-14 PROCEDURE — 3079F DIAST BP 80-89 MM HG: CPT | Mod: CPTII,S$GLB,, | Performed by: PHYSICIAN ASSISTANT

## 2024-03-14 PROCEDURE — 87186 SC STD MICRODIL/AGAR DIL: CPT | Mod: 59 | Performed by: PHYSICIAN ASSISTANT

## 2024-03-14 PROCEDURE — 87185 SC STD ENZYME DETCJ PER NZM: CPT | Performed by: PHYSICIAN ASSISTANT

## 2024-03-14 PROCEDURE — 1159F MED LIST DOCD IN RCRD: CPT | Mod: CPTII,S$GLB,, | Performed by: PHYSICIAN ASSISTANT

## 2024-03-14 PROCEDURE — 3074F SYST BP LT 130 MM HG: CPT | Mod: CPTII,S$GLB,, | Performed by: PHYSICIAN ASSISTANT

## 2024-03-14 PROCEDURE — 99214 OFFICE O/P EST MOD 30 MIN: CPT | Mod: 25,S$GLB,, | Performed by: PHYSICIAN ASSISTANT

## 2024-03-14 RX ORDER — CEFDINIR 300 MG/1
300 CAPSULE ORAL 2 TIMES DAILY
Qty: 20 CAPSULE | Refills: 0 | Status: SHIPPED | OUTPATIENT
Start: 2024-03-14 | End: 2024-03-24

## 2024-03-14 NOTE — PROGRESS NOTES
Subjective:      Sarahy is a 63 y.o. female with h/o tongue cancer s/p radiation+chemo, UC, GERD (on PPI)  who comes for follow-up of sinusitis.  Her last visit with me was on 9/6/2023.      Patient reports developing flu-like symptoms about 6 weeks ago with associated increased mucus production, cough and mild facial pressure.  Symptoms persisted so was evaluated by her PCP about 2 weeks ago and started on a 5 day course of doxycycline.  Patient reports symptoms improved significantly but then restarted 2 days after completing antibiotics.  Patient does report associated nasal congestion, throat clearing, cough, fatigue, and dysphonia.    Per last office visit 9/6/2023 :  Patient reports recurrent symptoms of off-balance or seesaw symptoms daily for the last 6 weeks with associated occipital headache.  Patient has reported similar episodes in the past following radiation and chemo for tongue cancer, but now symptoms are more frequent.  Patient denies any room spinning dizziness or vertigo.  Patient reports this is still different than prior episodes of vertigo.  Patient reports symptoms usually worse with moving her eyes in certain ways an with standing quickly.  Lasts all day.  Patient denies any ear pain, ear pressure, snoring, nasal obstruction, sneezing, rhinorrhea, or nasal obstruction.  Patient does report chronic postnasal drip for years which waxes and wanes.     Current sinonasal medications include none at present.  The last course of antibiotics was a long time ago.    She does not recall previously having allergy testing.  She denies a history of asthma.  She relates a history of reflux symptoms which is currently managed with pantoprazole.    She denies a diagnosis of obstructive sleep apnea.   She does not recall a prior history of nasal trauma.  She has not had sinonasal surgery.    She has had a tonsillectomy.  She is not a tobacco smoker.     1. Balance disorder  -     Laryngoscopy without  evidence of sinus infection, polyps, or masses  -     Ear exam WNL  -     Do not suspect inner ear pathology  -     Advised eval by ophthalmologist  -     Cherelle's exercises provided  -     Will have patient evaluated by otologist and undergo VNG if symptoms persist  2. Chronic rhinitis  3. Hypertrophy of inferior nasal turbinate  Prescribed patient to START fluticasone propionate & azelastine and also educated patient on how to properly use nasal sprays    4. History of tongue cancer  Overview:  Diagnosed/treated at [facility] in 2009.  Reportedly Stage 3 at time of diagnosis => treated with XRT/chemotherapy.  5. Laryngopharyngeal reflux (LPR)              - discussed LPR and cutting back on coffee  6. Cervicogenic headache              - could be contributing to sxs of facial pressure and headache  7. 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 Dysphagia, unspecified type              - side effects of radiation              - will continue to monitor      The patient's medications, allergies, past medical, surgical, social and family histories were reviewed and updated as appropriate.    A detailed review of systems was obtained with pertinent positives as per the above HPI, and otherwise negative.        Objective:     /86 (BP Location: Left arm, Patient Position: Sitting)   Pulse 97   Wt 52 kg (114 lb 10.2 oz)   BMI 20.97 kg/m²        Constitutional:   She appears well-developed and well-nourished. She is active. Normal speech.      Head:  Normocephalic and atraumatic.     Nose:  No mucosal edema, rhinorrhea or septal deviation. Turbinate hypertrophy.  Turbinates normal and no turbinate masses.  Right sinus exhibits maxillary sinus tenderness. Right sinus exhibits no frontal sinus tenderness. Left sinus exhibits maxillary sinus tenderness. Left sinus exhibits no frontal sinus tenderness.     Mouth/Throat  Tonsils not present.    S/p uvulectomy and partial glossectomy      Pulmonary/Chest:   Effort  "normal.     Psychiatric:   She has a normal mood and affect. Her speech is normal and behavior is normal.        Procedure    Flexible laryngoscopy performed.  See procedure note.     R NV     R MT     R PC with thick yellow mucopurulence (swabbed)     L NV     L MT     L PC     Edematous R TVF and erythematous TVFs      Data Reviewed    WBC (K/uL)   Date Value   11/27/2023 5.94     Eosinophil % (%)   Date Value   11/27/2023 2.2     Eos # (K/uL)   Date Value   11/27/2023 0.1     Platelets (K/uL)   Date Value   11/27/2023 182     Glucose (mg/dL)   Date Value   11/27/2023 89     No results found for: "IGE"    Assessment:     1. Subacute maxillary sinusitis    2. Acute non-recurrent maxillary sinusitis    3. Dysphonia    4. Vocal fold edema    5. 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 Ulcerative colitis with complication, unspecified location         Plan:     Suspect patient developed viral infection which led to the subacute maxillary sinusitis.  Patient recently underwent 5 days of doxycycline which is likely not long enough to completely treat the symptoms as evident on nasal endoscopy today.  I swabbed her nasal exudate for culture and will use the results to direct antibiotic therapy as indicated.  Start Cefdnir   Advised probiotics especially in setting of UC  START saline rinse at least once daily  CT sinus if symptoms do not improve    Xylimelts for throat dryness/xerostomia  Dysphonia should improve with improved of cough and sinonasal inflammation    She will Follow up if symptoms worsen or fail to improve.  I had a discussion with the patient regarding her condition and the further workup and management options.    All questions were answered, and the patient is in agreement with the above.     Disclaimer:  This note may have been prepared utilizing voice recognition software which may result in occasional typographical errors in the text such as sound alike words.   If further clarification is needed, please " contact the ENT department of [facility].

## 2024-03-14 NOTE — PROCEDURES
"Laryngoscopy    Date/Time: 3/14/2024 9:00 AM    Performed by: Blake Tejeda PA-C  Authorized by: Blake Tejeda PA-C    Time out: Immediately prior to procedure a "time out" was called to verify the correct patient, procedure, equipment, support staff and site/side marked as required.    Anesthesia:     Local anesthetic:  Lidocaine 4% and Fred-Synephrine 1/2%    Patient tolerance:  Patient tolerated the procedure well with no immediate complications  Laryngoscopy:     Areas examined:  Nasal cavities, nasopharynx, oropharynx, hypopharynx, larynx and vocal cords    Laryngoscope size:  4 mm  Nose Intranasal:      Mucosa no polyps     Mucosa ulcers not present     No mucosa lesions present     Septum gross deformity     Enlarged turbinates  Nasopharynx:      No mucosa lesions     Adenoids not present     Posterior choanae patent     Eustachian tube patent  Larynx/hypopharynx:      No epiglottis lesions     No epiglottis edema     No AE folds lesions     No vocal cord polyps     Equal and normal bilateral     No hypopharynx lesions     No piriform sinus pooling     No piriform sinus lesions     Post cricoid edema     No post cricoid erythema     Thick yellow mucous draining from R max ostium into nasopharync  Thick clear mucous in BL PCs draining from SE recesses  R VF with edemat and both TVFs with erythema    "

## 2024-03-16 LAB
BACTERIA SPEC AEROBE CULT: ABNORMAL
BACTERIA SPEC AEROBE CULT: ABNORMAL

## 2024-03-18 ENCOUNTER — PATIENT MESSAGE (OUTPATIENT)
Dept: OTOLARYNGOLOGY | Facility: CLINIC | Age: 64
End: 2024-03-18
Payer: COMMERCIAL

## 2024-03-18 LAB — BACTERIA SPEC ANAEROBE CULT: NORMAL

## 2024-04-03 NOTE — PATIENT INSTRUCTIONS
U/A and urine cx  CT urogram  with POCT creatinine prior at [facility].  Follow-up pending CT results.    59

## 2024-05-03 ENCOUNTER — OFFICE VISIT (OUTPATIENT)
Dept: URGENT CARE | Facility: CLINIC | Age: 64
End: 2024-05-03
Payer: COMMERCIAL

## 2024-05-03 VITALS
DIASTOLIC BLOOD PRESSURE: 79 MMHG | RESPIRATION RATE: 17 BRPM | WEIGHT: 114.63 LBS | HEIGHT: 62 IN | SYSTOLIC BLOOD PRESSURE: 120 MMHG | OXYGEN SATURATION: 96 % | BODY MASS INDEX: 21.1 KG/M2 | HEART RATE: 83 BPM | TEMPERATURE: 98 F

## 2024-05-03 DIAGNOSIS — N39.0 URINARY TRACT INFECTION WITHOUT HEMATURIA, SITE UNSPECIFIED: Primary | ICD-10-CM

## 2024-05-03 DIAGNOSIS — R35.0 URINARY FREQUENCY: ICD-10-CM

## 2024-05-03 DIAGNOSIS — R39.15 URGENCY OF MICTURITION: ICD-10-CM

## 2024-05-03 DIAGNOSIS — Z87.440 HISTORY OF UTI: ICD-10-CM

## 2024-05-03 DIAGNOSIS — R30.0 DYSURIA: ICD-10-CM

## 2024-05-03 LAB
BILIRUB UR QL STRIP: NEGATIVE
GLUCOSE UR QL STRIP: NEGATIVE
KETONES UR QL STRIP: NEGATIVE
LEUKOCYTE ESTERASE UR QL STRIP: NEGATIVE
PH, POC UA: 5.5 (ref 5–8)
POC BLOOD, URINE: POSITIVE
POC NITRATES, URINE: NEGATIVE
PROT UR QL STRIP: NEGATIVE
SP GR UR STRIP: 1.02 (ref 1–1.03)
UROBILINOGEN UR STRIP-ACNC: ABNORMAL (ref 0.1–1.1)

## 2024-05-03 PROCEDURE — 81003 URINALYSIS AUTO W/O SCOPE: CPT | Mod: QW,S$GLB,,

## 2024-05-03 PROCEDURE — 87086 URINE CULTURE/COLONY COUNT: CPT

## 2024-05-03 PROCEDURE — 99213 OFFICE O/P EST LOW 20 MIN: CPT | Mod: S$GLB,,,

## 2024-05-03 RX ORDER — NITROFURANTOIN 25; 75 MG/1; MG/1
100 CAPSULE ORAL 2 TIMES DAILY
Qty: 14 CAPSULE | Refills: 0 | Status: SHIPPED | OUTPATIENT
Start: 2024-05-03 | End: 2024-05-10

## 2024-05-03 NOTE — PROGRESS NOTES
"Subjective:      Patient ID: Sarahy Ma is a 63 y.o. female.    Vitals:  height is 5' 2" (1.575 m) and weight is 52 kg (114 lb 10.2 oz). Her oral temperature is 98 °F (36.7 °C). Her blood pressure is 120/79 and her pulse is 83. Her respiration is 17 and oxygen saturation is 96%.     Chief Complaint: Dysuria    Pt coming into clinic with dysuria, frequency to urinate, that started today, treatment includes nothing.    Provider note:    62 yo F c/o urinary frequency, urgency, burning, and right flank pain that started today. Denies fever, NVD, bodyaches, blood in urine. Not taking anything otc. Reports hx of UTIs, last one 1/2024.     Dysuria   This is a new problem. The problem has been unchanged. The quality of the pain is described as burning. The pain is at a severity of 10/10. The pain is severe. There has been no fever. She is Not sexually active. There is No history of pyelonephritis. Associated symptoms include flank pain, frequency and urgency. Pertinent negatives include no behavior changes, chills, discharge, hematuria, hesitancy, nausea, possible pregnancy, sweats, vomiting, weight loss, bubble bath use, constipation, rash or withholding. She has tried nothing for the symptoms. The treatment provided no relief. Her past medical history is significant for hypertension and recurrent UTIs. There is no history of catheterization, diabetes insipidus, diabetes mellitus, genitourinary reflux, kidney stones, a single kidney, STD, urinary stasis or a urological procedure.     Constitution: Negative for chills, sweating, fatigue and fever.   Gastrointestinal:  Negative for nausea, vomiting, constipation and diarrhea.   Genitourinary:  Positive for dysuria, frequency, urgency and flank pain. Negative for urine decreased, bladder incontinence, bed wetting and hematuria.   Skin:  Negative for rash.      Objective:     Physical Exam   Constitutional: She is oriented to person, place, and time. normal  HENT: "   Head: Normocephalic and atraumatic.   Eyes: Conjunctivae are normal. Pupils are equal, round, and reactive to light. Extraocular movement intact   Abdominal: Normal appearance. There is no left CVA tenderness and no right CVA tenderness.   Musculoskeletal: Normal range of motion.         General: Normal range of motion.   Neurological: She is alert and oriented to person, place, and time.   Skin: Skin is warm and dry.       Assessment:     1. Urinary tract infection without hematuria, site unspecified    2. Dysuria    3. Urgency of micturition    4. Urinary frequency    5. History of UTI        Plan:   Pt would like to start treatment while awaiting urine culture result. Discussed other etiologies such as kidney stone.     Results for orders placed or performed in visit on 05/03/24   POCT Urinalysis, Dipstick, Automated, W/O Scope   Result Value Ref Range    POC Blood, Urine Positive (A) Negative    POC Bilirubin, Urine Negative Negative    POC Urobilinogen, Urine norm 0.1 - 1.1    POC Ketones, Urine Negative Negative    POC Protein, Urine Negative Negative    POC Nitrates, Urine Negative Negative    POC Glucose, Urine Negative Negative    pH, UA 5.5 5 - 8    POC Specific Gravity, Urine 1.025 1.003 - 1.029    POC Leukocytes, Urine Negative Negative         Urinary tract infection without hematuria, site unspecified    Dysuria  -     POCT Urinalysis, Dipstick, Automated, W/O Scope  -     CULTURE, URINE  -     nitrofurantoin, macrocrystal-monohydrate, (MACROBID) 100 MG capsule; Take 1 capsule (100 mg total) by mouth 2 (two) times daily. for 7 days  Dispense: 14 capsule; Refill: 0    Urgency of micturition  -     CULTURE, URINE  -     nitrofurantoin, macrocrystal-monohydrate, (MACROBID) 100 MG capsule; Take 1 capsule (100 mg total) by mouth 2 (two) times daily. for 7 days  Dispense: 14 capsule; Refill: 0    Urinary frequency  -     nitrofurantoin, macrocrystal-monohydrate, (MACROBID) 100 MG capsule; Take 1 capsule  (100 mg total) by mouth 2 (two) times daily. for 7 days  Dispense: 14 capsule; Refill: 0    History of UTI      UTI (Urinary Tract Infection)    Cranberry juice may help. Get the 100% cranberry juice and mix 4 oz of juice with 4 oz of water and drink this 8 oz glass of liquid once a day.     Increase water intake to at least 8-10 glasses/day.    Avoid caffeine, alcohol, or spicy foods as they irritate the bladder.      If you take OTC AZO/Pyridium/Phenazopyridine, follow instructions as directed.  Do NOT take for more than 2 days.  Do not wear contacts with Pyridium as it will stain them.  You may want to wear a panty liner with Pyridium as it may stain your underwear.    If you had cultures done it will take 3-5 days to result. We will call you with the result.    If you are are female and on birth control pills use additional methods (such as condom use) to prevent pregnancy while on the antibiotics and for one cycle after.     If your condition worsens or fails to improve we recommend that you receive another evaluation at the ER immediately or contact your PCP to discuss your concerns or return here.

## 2024-05-04 DIAGNOSIS — E89.0 POSTABLATIVE HYPOTHYROIDISM: ICD-10-CM

## 2024-05-04 RX ORDER — LEVOTHYROXINE SODIUM 75 UG/1
75 TABLET ORAL
Qty: 90 TABLET | Refills: 1 | Status: SHIPPED | OUTPATIENT
Start: 2024-05-04

## 2024-05-04 NOTE — TELEPHONE ENCOUNTER
Refill Decision Note   Sarahy Ma  is requesting a refill authorization.  Brief Assessment and Rationale for Refill:  Approve     Medication Therapy Plan:         Comments:     Note composed:6:13 PM 05/04/2024

## 2024-05-04 NOTE — TELEPHONE ENCOUNTER
No care due was identified.  [facility] Embedded Care Due Messages. Reference number: [id number].   5/04/2024 9:53:38 AM CDT

## 2024-05-05 LAB — BACTERIA UR CULT: NORMAL

## 2024-05-06 ENCOUNTER — PATIENT MESSAGE (OUTPATIENT)
Dept: UROLOGY | Facility: CLINIC | Age: 64
End: 2024-05-06
Payer: COMMERCIAL

## 2024-05-06 ENCOUNTER — OFFICE VISIT (OUTPATIENT)
Dept: OPTOMETRY | Facility: CLINIC | Age: 64
End: 2024-05-06
Payer: COMMERCIAL

## 2024-05-06 DIAGNOSIS — H52.7 REFRACTION ERROR: ICD-10-CM

## 2024-05-06 DIAGNOSIS — H25.13 NUCLEAR SCLEROSIS, BILATERAL: ICD-10-CM

## 2024-05-06 DIAGNOSIS — Z13.5 GLAUCOMA SCREENING: ICD-10-CM

## 2024-05-06 DIAGNOSIS — H31.011 MACULAR SCAR OF RIGHT EYE: Primary | ICD-10-CM

## 2024-05-06 PROCEDURE — 1159F MED LIST DOCD IN RCRD: CPT | Mod: CPTII,S$GLB,, | Performed by: OPTOMETRIST

## 2024-05-06 PROCEDURE — 99999 PR PBB SHADOW E&M-EST. PATIENT-LVL III: CPT | Mod: PBBFAC,,, | Performed by: OPTOMETRIST

## 2024-05-06 PROCEDURE — 92015 DETERMINE REFRACTIVE STATE: CPT | Mod: S$GLB,,, | Performed by: OPTOMETRIST

## 2024-05-06 PROCEDURE — 92004 COMPRE OPH EXAM NEW PT 1/>: CPT | Mod: S$GLB,,, | Performed by: OPTOMETRIST

## 2024-05-06 PROCEDURE — 1160F RVW MEDS BY RX/DR IN RCRD: CPT | Mod: CPTII,S$GLB,, | Performed by: OPTOMETRIST

## 2024-05-06 PROCEDURE — 92134 CPTRZ OPH DX IMG PST SGM RTA: CPT | Mod: S$GLB,,, | Performed by: OPTOMETRIST

## 2024-05-06 NOTE — PROGRESS NOTES
HPI    62 Y/o female is here for routine eye exam with C/o pt states she has Rx   glasses but does not see clearly out of them pt say's she has been wearing   otc readers for both near and far in place of her Rx glasses. Pt also   say's she notices she needs a lot of light when reading. Pt states when   driving at night the glare from lights are blinding her and making it hard   for her to drive.  Pt denies pain and discomfort   No f/f    Eye med: no gtt   Last edited by Lupillo Wolff MA on 5/6/2024 10:33 AM.            Assessment /Plan     For exam results, see Encounter Report.    Macular scar of right eye  -     OCT, Retina - OU - Both Eyes    Nuclear sclerosis, bilateral    Glaucoma screening    Refraction error      Small mac scar OD--see OCT.  Pt denies sungazing.  BCVA 20/25  Cat OU  Pt wearing readers, but will do better in bifocals.  Discussed PALs/adaptation    PLAN:    Rtc 1 yr

## 2024-05-14 ENCOUNTER — PATIENT MESSAGE (OUTPATIENT)
Dept: PRIMARY CARE CLINIC | Facility: CLINIC | Age: 64
End: 2024-05-14
Payer: COMMERCIAL

## 2024-05-14 DIAGNOSIS — Z00.00 ANNUAL PHYSICAL EXAM: ICD-10-CM

## 2024-05-14 DIAGNOSIS — Z12.31 SCREENING MAMMOGRAM, ENCOUNTER FOR: Primary | ICD-10-CM

## 2024-05-14 DIAGNOSIS — E89.0 POSTABLATIVE HYPOTHYROIDISM: ICD-10-CM

## 2024-05-15 NOTE — TELEPHONE ENCOUNTER
Is not due for her annual labs until November, ok to due skip visit in June and see me with labs prior in November (ordered)    MMG ordered

## 2024-05-16 ENCOUNTER — PATIENT MESSAGE (OUTPATIENT)
Dept: PRIMARY CARE CLINIC | Facility: CLINIC | Age: 64
End: 2024-05-16
Payer: COMMERCIAL

## 2024-05-30 DIAGNOSIS — R06.02 SHORTNESS OF BREATH: ICD-10-CM

## 2024-05-30 RX ORDER — FLUTICASONE FUROATE AND VILANTEROL 100; 25 UG/1; UG/1
1 POWDER RESPIRATORY (INHALATION) DAILY
Qty: 180 EACH | Refills: 2 | Status: SHIPPED | OUTPATIENT
Start: 2024-05-30

## 2024-05-30 NOTE — TELEPHONE ENCOUNTER
No care due was identified.  [facility] Embedded Care Due Messages. Reference number: [id number].   5/30/2024 8:28:05 AM CDT

## 2024-06-13 ENCOUNTER — PATIENT MESSAGE (OUTPATIENT)
Dept: PRIMARY CARE CLINIC | Facility: CLINIC | Age: 64
End: 2024-06-13
Payer: COMMERCIAL

## 2024-06-20 ENCOUNTER — HOSPITAL ENCOUNTER (EMERGENCY)
Facility: HOSPITAL | Age: 64
Discharge: HOME | End: 2024-06-20
Attending: EMERGENCY MEDICINE
Payer: COMMERCIAL

## 2024-06-20 VITALS
HEART RATE: 78 BPM | DIASTOLIC BLOOD PRESSURE: 98 MMHG | WEIGHT: 114 LBS | HEIGHT: 62 IN | RESPIRATION RATE: 16 BRPM | SYSTOLIC BLOOD PRESSURE: 141 MMHG | BODY MASS INDEX: 20.98 KG/M2 | OXYGEN SATURATION: 98 % | TEMPERATURE: 98 F

## 2024-06-20 DIAGNOSIS — M48.061 SPINAL STENOSIS OF LUMBAR REGION, UNSPECIFIED WHETHER NEUROGENIC CLAUDICATION PRESENT: ICD-10-CM

## 2024-06-20 DIAGNOSIS — M51.36 BULGING OF LUMBAR INTERVERTEBRAL DISC: ICD-10-CM

## 2024-06-20 DIAGNOSIS — G89.29 ACUTE EXACERBATION OF CHRONIC LOW BACK PAIN: ICD-10-CM

## 2024-06-20 DIAGNOSIS — M54.16 LUMBAR RADICULOPATHY: Primary | ICD-10-CM

## 2024-06-20 DIAGNOSIS — M54.50 ACUTE EXACERBATION OF CHRONIC LOW BACK PAIN: ICD-10-CM

## 2024-06-20 LAB
BACTERIA #/AREA URNS AUTO: NORMAL /HPF
BILIRUB UR QL STRIP: NEGATIVE
CLARITY UR REFRACT.AUTO: CLEAR
COLOR UR AUTO: ABNORMAL
GLUCOSE UR QL STRIP: NEGATIVE
HGB UR QL STRIP: ABNORMAL
KETONES UR QL STRIP: NEGATIVE
LEUKOCYTE ESTERASE UR QL STRIP: NEGATIVE
MICROSCOPIC COMMENT: NORMAL
NITRITE UR QL STRIP: NEGATIVE
PH UR STRIP: 7 [PH] (ref 5–8)
PROT UR QL STRIP: NEGATIVE
RBC #/AREA URNS AUTO: 3 /HPF (ref 0–4)
SP GR UR STRIP: 1.01 (ref 1–1.03)
URN SPEC COLLECT METH UR: ABNORMAL
WBC #/AREA URNS AUTO: 1 /HPF (ref 0–5)

## 2024-06-20 PROCEDURE — 96372 THER/PROPH/DIAG INJ SC/IM: CPT | Performed by: PHYSICIAN ASSISTANT

## 2024-06-20 PROCEDURE — 25000003 PHARM REV CODE 250: Performed by: PHYSICIAN ASSISTANT

## 2024-06-20 PROCEDURE — 81001 URINALYSIS AUTO W/SCOPE: CPT | Performed by: PHYSICIAN ASSISTANT

## 2024-06-20 PROCEDURE — 99285 EMERGENCY DEPT VISIT HI MDM: CPT | Mod: 25

## 2024-06-20 PROCEDURE — 63600175 PHARM REV CODE 636 W HCPCS: Performed by: PHYSICIAN ASSISTANT

## 2024-06-20 RX ORDER — HYDROCODONE BITARTRATE AND ACETAMINOPHEN 5; 325 MG/1; MG/1
1 TABLET ORAL
Status: COMPLETED | OUTPATIENT
Start: 2024-06-20 | End: 2024-06-20

## 2024-06-20 RX ORDER — LIDOCAINE 50 MG/G
1 PATCH TOPICAL
Status: DISCONTINUED | OUTPATIENT
Start: 2024-06-20 | End: 2024-06-20 | Stop reason: HOSPADM

## 2024-06-20 RX ORDER — LIDOCAINE 50 MG/G
1 PATCH TOPICAL DAILY PRN
Qty: 10 PATCH | Refills: 0 | Status: SHIPPED | OUTPATIENT
Start: 2024-06-20

## 2024-06-20 RX ORDER — HYDROCODONE BITARTRATE AND ACETAMINOPHEN 5; 325 MG/1; MG/1
1 TABLET ORAL EVERY 4 HOURS PRN
Qty: 15 TABLET | Refills: 0 | Status: SHIPPED | OUTPATIENT
Start: 2024-06-20 | End: 2024-06-23

## 2024-06-20 RX ORDER — METHOCARBAMOL 500 MG/1
500 TABLET, FILM COATED ORAL
Status: COMPLETED | OUTPATIENT
Start: 2024-06-20 | End: 2024-06-20

## 2024-06-20 RX ORDER — METHYLPREDNISOLONE 4 MG/1
TABLET ORAL
Qty: 1 EACH | Refills: 0 | Status: SHIPPED | OUTPATIENT
Start: 2024-06-20 | End: 2024-07-11

## 2024-06-20 RX ORDER — METHOCARBAMOL 500 MG/1
500 TABLET, FILM COATED ORAL 2 TIMES DAILY PRN
Qty: 14 TABLET | Refills: 0 | Status: SHIPPED | OUTPATIENT
Start: 2024-06-20 | End: 2024-06-27

## 2024-06-20 RX ORDER — KETOROLAC TROMETHAMINE 30 MG/ML
15 INJECTION, SOLUTION INTRAMUSCULAR; INTRAVENOUS
Status: COMPLETED | OUTPATIENT
Start: 2024-06-20 | End: 2024-06-20

## 2024-06-20 RX ADMIN — LIDOCAINE 5% 1 PATCH: 700 PATCH TOPICAL at 08:06

## 2024-06-20 RX ADMIN — HYDROCODONE BITARTRATE AND ACETAMINOPHEN 1 TABLET: 5; 325 TABLET ORAL at 10:06

## 2024-06-20 RX ADMIN — METHOCARBAMOL 500 MG: 500 TABLET ORAL at 08:06

## 2024-06-20 RX ADMIN — KETOROLAC TROMETHAMINE 15 MG: 30 INJECTION, SOLUTION INTRAMUSCULAR; INTRAVENOUS at 08:06

## 2024-06-20 NOTE — ED PROVIDER NOTES
"Encounter Date: 6/20/2024       History     Chief Complaint   Patient presents with    Leg Pain     Hx of sciatica. Unable to sleep last night d/t pain and "itching" to BLE unrelieved by rx'd gabapentin     The patient is a 64-year-old female.  She has a past medical history significant for hypertension, tongue cancer s/p radiation treatment currently in remission, ulcerative colitis, lumbar degenerative disc disease, and sciatica.  She presents to the ER complaining of an acute exacerbation of her chronic low back pain.  She states that since yesterday she has had significantly increased low back pain, worse on the right side, radiating to right buttock and right lower extremity.  She reports tingling and crawling sensations to her right lower extremity.  She states that walking and ambulation exacerbates the pain.  She denies any known injury, trauma, or inciting event.  She states that she previously had a lumbar MRI in 2021 that showed arthritis and a bulging disc.  She was seeing neurosurgery and pain management for this at the time and had spine injections done.  She states that her back pain has not bothered her since and has been tolerable until now.  Since yesterday she tried taking gabapentin, Tylenol, Advil, Salonpas, and using a heating pad with minimal relief.  She states that she went to see her chiropractor yesterday afternoon and had an adjustment, but continues to experience significant pain.  She denies any saddle paresthesia.  She denies any bowel or bladder dysfunction.  She denies any fevers or chills.      Review of patient's allergies indicates:   Allergen Reactions    Amoxicillin      Other reaction(s): fever blisters    Nsaids (non-steroidal anti-inflammatory drug)      Other reaction(s): nausea, stomach issues     Past Medical History:   Diagnosis Date    DDD (degenerative disc disease), lumbar     Hypertension     Postablative hypothyroidism     secondary to the radiation treatment for " tongue cancer - treated by oncologist    Sciatica     Tongue cancer     Ulcerative colitis     Treated by Dr. Tobias     Past Surgical History:   Procedure Laterality Date     SECTION      CHOLECYSTECTOMY  2016    removed    CHOLECYSTECTOMY  2016    COLONOSCOPY      Dr. Tobias    COLONOSCOPY N/A 2021    Procedure: COLONOSCOPY Suprep;  Surgeon: Chetan Forte MD;  Location: [facility] ENDO;  Service: Endoscopy;  Laterality: N/A;    feeding tube      groin surgery  2017    swollen lump node    HYSTERECTOMY      OOPHORECTOMY      PORTACATH PLACEMENT      TONSILLECTOMY      TRANSFORAMINAL EPIDURAL INJECTION OF STEROID Right 2021    Procedure: LUMBAR TRANSFORAMINAL RIGHT L2/3 AND L3/4 DIRECT REFERRAL ;  Surgeon: Tana Beckwith MD;  Location: [facility] PAIN T;  Service: Pain Management;  Laterality: Right;    TUBAL LIGATION       Family History   Problem Relation Name Age of Onset    Hypertension Father Gilbert Joseph     Diabetes Father Gilbert Joseph     Heart attack Father Gilbert Joseph     Heart disease Father Gilbert Joseph     Heart failure Father Gilbert Joseph     Hyperlipidemia Father Gilbert Joseph     Arthritis Father Gilbert Joseph     Hypertension Mother Carey Joseph     Arthritis Mother Carey Joseph     Thyroid disease Sister      Thyroid disease Brother      Thyroid disease Sister      Thyroid disease Brother      Cancer Paternal Grandmother Grandmother     Early death Brother Herman Joseph     Stroke Neg Hx       Social History     Tobacco Use    Smoking status: Never     Passive exposure: Never    Smokeless tobacco: Never   Substance Use Topics    Alcohol use: Not Currently     Comment: occasionally    Drug use: No     Review of Systems   Constitutional:  Negative for chills and fever.   Respiratory:  Negative for shortness of breath.    Cardiovascular:  Negative for chest pain and leg swelling.   Gastrointestinal:  Negative for abdominal pain, constipation,  diarrhea, nausea and vomiting.   Genitourinary:  Negative for decreased urine volume, difficulty urinating, dysuria, flank pain, frequency, hematuria, pelvic pain and urgency.   Musculoskeletal:  Positive for back pain and gait problem. Negative for joint swelling and neck pain.   Skin:  Negative for color change and rash.   Allergic/Immunologic: Negative for immunocompromised state.   Neurological:  Positive for numbness. Negative for dizziness, weakness, light-headedness and headaches.   Psychiatric/Behavioral:  Negative for confusion.        Physical Exam     Initial Vitals [06/20/24 0701]   BP Pulse Resp Temp SpO2   (!) 152/96 80 15 97.8 °F (36.6 °C) 95 %      MAP       --         Physical Exam    Nursing note and vitals reviewed.  Constitutional: She appears well-developed and well-nourished. She is not diaphoretic.   She is alert and interactive.  She was ambulatory with an antalgic gait.  She appears uncomfortable.  She was accompanied by her .   HENT:   Head: Normocephalic.   Eyes: Conjunctivae are normal.   Neck: Neck supple.   Cardiovascular:  Normal rate and intact distal pulses.           Pulmonary/Chest: No respiratory distress.   Abdominal: She exhibits no distension. There is no abdominal tenderness.   Musculoskeletal:      Cervical back: Neck supple.      Comments: Diffuse lumbar tenderness, worse on the right.  No focal vertebral point tenderness to palpation.  Tenderness to palpation over right sciatic nerve distribution.     Neurological: She is alert and oriented to person, place, and time. She has normal strength. No sensory deficit.   Skin: Skin is warm and dry. No rash noted. No erythema.   Psychiatric: She has a normal mood and affect.         ED Course   Procedures  Labs Reviewed   URINALYSIS, REFLEX TO URINE CULTURE - Abnormal; Notable for the following components:       Result Value    Occult Blood UA 1+ (*)     All other components within normal limits    Narrative:     Specimen  Source->Urine   URINALYSIS MICROSCOPIC    Narrative:     Specimen Source->Urine     Results for orders placed or performed during the hospital encounter of 06/20/24   Urinalysis, Reflex to Urine Culture Urine, Clean Catch    Specimen: Urine   Result Value Ref Range    Specimen UA Urine, Clean Catch     Color, UA Straw Yellow, Straw, Camryn    Appearance, UA Clear Clear    pH, UA 7.0 5.0 - 8.0    Specific Gravity, UA 1.010 1.005 - 1.030    Protein, UA Negative Negative    Glucose, UA Negative Negative    Ketones, UA Negative Negative    Bilirubin (UA) Negative Negative    Occult Blood UA 1+ (A) Negative    Nitrite, UA Negative Negative    Leukocytes, UA Negative Negative   Urinalysis Microscopic   Result Value Ref Range    RBC, UA 3 0 - 4 /hpf    WBC, UA 1 0 - 5 /hpf    Bacteria Rare None-Occ /hpf    Microscopic Comment SEE COMMENT             Imaging Results              MRI Lumbar Spine Without Contrast (Final result)  Result time 06/20/24 09:32:06      Final result by Domo Mayes MD (06/20/24 09:32:06)                   Impression:      No significant detrimental change from 10/26/2021 with persistent moderate central canal narrowing L3-L4 secondary to broad-based bulging of disc material facet hypertrophy/ligamentum thickening with associated neural foraminal encroachment as detailed above.    Modic type 1 changes again identified L2-L3    Level by level details as above      Electronically signed by: Domo Mayes MD  Date:    06/20/2024  Time:    09:32               Narrative:    EXAMINATION:  MRI LUMBAR SPINE WITHOUT CONTRAST    CLINICAL HISTORY:  Low back pain, symptoms persist with > 6wks conservative treatment;Lumbar radiculopathy, symptoms persist with conservative treatment;Lumbar radiculopathy; hx of spinal stenosis; history of oral cancer;    TECHNIQUE:  Multiplanar, multisequence MR images were acquired from the thoracolumbar junction to the sacrum without the administration of  contrast.    COMPARISON:  10/26/2021    FINDINGS:  Alignment: Mild scoliosis.    Vertebrae: 5 lumbar-type vertebral bodies. No aggressive marrow replacement process or fracture.L2-L3bone marrow edema cortical endplates.  Modic Changes: Type 1 (edematous endplate signal) level: L2-L3    Discs: Disc space narrowing predominantly at L2-L3 and to a lesser extent L4-L5.  Multilevel desiccation of disc material.    Cord: Normal. Conus terminates at T12-L1.    Degenerative findings:    T12-L1: Disc space narrowing associated with broad-based bulging of disc material.  Mild facet hypertrophy.  No significant central canal narrowing . No significant neural foraminal narrowing.    L1-L2: Mild broad-based bulging of disc material associated with disc space narrowing.  Facet hypertrophy.  No significant central canal narrowing . No significant neural foraminal narrowing.    L2-L3: Broad-based disc bulge, facet degenerative change and ligamentum flavum thickening which contribute to  mild-moderate central canal narrowing . No significant neural foraminal narrowing.    L3-L4: Broad-based disc bulge, facet degenerative change and ligamentum flavum thickening which contribute to  moderate central canal narrowing . Mild RIGHT mild-moderate LEFT neural foraminal narrowing.    L4-L5: Broad-based disc bulge, facet degenerative change and ligamentum flavum thickening which contribute to   mild central canal narrowing . Mild RIGHT mild-moderate LEFT neural foraminal narrowing.    L5-S1: There is no focal disc herniation.Facet hypertrophy.  No significant central canal narrowing . Mild bilateral neural foraminal narrowing.    Paraspinal muscles & soft tissues: Unremarkable.                                       Medications   ketorolac injection 15 mg (15 mg Intramuscular Given 6/20/24 0808)   methocarbamoL tablet 500 mg (500 mg Oral Given 6/20/24 0808)   HYDROcodone-acetaminophen 5-325 mg per tablet 1 tablet (1 tablet Oral Given 6/20/24  100)     Medical Decision Making  Amount and/or Complexity of Data Reviewed  Radiology: ordered.    Risk  Prescription drug management.                          Medical Decision Making:   History:   I obtained history from: someone other than patient.       <> Summary of History: History obtained from the patient and from her   Old Medical Records: I decided to obtain old medical records.  Old Records Summarized: other records.       <> Summary of Records: Chart reviewed completed, previous lumbar MRI report reviewed  Initial Assessment:   64-year-old female with history of tongue cancer in remission, lumbar degenerative disc disease and chronic low back pain with sciatica presents to the ER complaining of an acute exacerbation of her chronic low back pain since yesterday with radicular symptoms to right lower extremity unrelieved with pre arrival treatment.  Differential Diagnosis:   Acute exacerbation of chronic low back pain, sciatica, lumbago, lumbar radiculopathy, spinal stenosis, spondylosis, spondylolisthesis, compression fracture, pathological fracture, cauda equina, etc.    Clinical Tests:   Radiological Study: Ordered and Reviewed  ED Management:  Vital signs reviewed, elevated blood pressure reading noted, afebrile, no tachycardia or tachypnea   Chart review completed, previous MRI lumbar spine report reviewed   Lidoderm patch and IM Toradol administered for pain relief  MRI lumbar spine without contrast was completed and shows degenerative changes and disc bulge with spinal stenosis unchanged in comparison to previous MRI  Patient reports feeling better after treatment in the ER.  Results discussed.  Discharge instructions provided.  Return precautions advised.  Patient is established with Neurosurgery and pain management.  Patient was advised to call to schedule outpatient follow up this week for re-evaluation and further management.  Prescriptions for Medrol Dosepak, Lidoderm patches, Robaxin,  and Norco were provided for pain relief  Patient was advised to continue gabapentin as prescribed and to use heating pad at home as needed  Patient was advised to return to the ER promptly if symptoms are improved or worse in any way             Clinical Impression:  Final diagnoses:  [M54.16] Lumbar radiculopathy (Primary)  [M54.50, G89.29] Acute exacerbation of chronic low back pain  [M51.36] Bulging of lumbar intervertebral disc  [M48.061] Spinal stenosis of lumbar region, unspecified whether neurogenic claudication present          ED Disposition Condition    Discharge Stable          ED Prescriptions       Medication Sig Dispense Start Date End Date Auth. Provider    methylPREDNISolone (MEDROL DOSEPACK) 4 mg tablet Take as directed 1 each 6/20/2024 7/11/2024 Yonis Busch PA-C    methocarbamoL (ROBAXIN) 500 MG Tab Take 1 tablet (500 mg total) by mouth 2 (two) times daily as needed (muscle relaxer). 14 tablet 6/20/2024 6/27/2024 Yonis Busch PA-C    LIDOcaine (LIDODERM) 5 % Place 1 patch onto the skin daily as needed (pain). Remove & Discard patch within 12 hours or as directed by MD 10 patch 6/20/2024 -- Yonis Busch PA-C    HYDROcodone-acetaminophen (NORCO) 5-325 mg per tablet Take 1 tablet by mouth every 4 (four) hours as needed for Pain. 15 tablet 6/20/2024 6/23/2024 Yonis Busch PA-C          Follow-up Information       Follow up With Specialties Details Why Contact Info    Barb Dixon MD Internal Medicine Schedule an appointment as soon as possible for a visit in 2 days Follow up with your primary care physician, pain management specialist, and your neurosurgeon at next available. [id number] [facility]  [address]  [facility] [address]  [phone]      [facility] - Emergency Dept Emergency Medicine  Return to the ER if worse in any way or if unimproved. [address]  [facility] [id number] [phone]             Yonis Busch PA-C  06/20/24 2156

## 2024-06-20 NOTE — ED NOTES
Patient identifiers verified and correct for MS Ma  C/C: Back pain SEE NN  APPEARANCE: awake and alert in NAD. PAIN  10/10  SKIN: warm, dry and intact. No breakdown or bruising.  MUSCULOSKELETAL: Patient moving all extremities spontaneously, no obvious swelling or deformities noted. Ambulates independently.  RESPIRATORY: Denies shortness of breath.Respirations unlabored.   CARDIAC: Denies CP, 2+ distal pulses; no peripheral edema  ABDOMEN: S/ND/NT, Denies nausea  : voids spontaneously, denies difficulty  Neurologic: AAO x 4; follows commands equal strength in all extremities; denies numbness/tingling. Denies dizziness  denies new eankess

## 2024-06-24 ENCOUNTER — TELEPHONE (OUTPATIENT)
Dept: ADMINISTRATIVE | Facility: CLINIC | Age: 64
End: 2024-06-24
Payer: COMMERCIAL

## 2024-07-16 ENCOUNTER — HOSPITAL ENCOUNTER (OUTPATIENT)
Dept: RADIOLOGY | Facility: HOSPITAL | Age: 64
Discharge: HOME | End: 2024-07-16
Attending: STUDENT IN AN ORGANIZED HEALTH CARE EDUCATION/TRAINING PROGRAM
Payer: COMMERCIAL

## 2024-07-16 ENCOUNTER — OFFICE VISIT (OUTPATIENT)
Dept: PODIATRY | Facility: CLINIC | Age: 64
End: 2024-07-16
Payer: COMMERCIAL

## 2024-07-16 VITALS
WEIGHT: 114 LBS | HEART RATE: 80 BPM | HEIGHT: 62 IN | SYSTOLIC BLOOD PRESSURE: 107 MMHG | BODY MASS INDEX: 20.98 KG/M2 | DIASTOLIC BLOOD PRESSURE: 75 MMHG

## 2024-07-16 DIAGNOSIS — M21.6X2 PRONATION DEFORMITY OF BOTH FEET: ICD-10-CM

## 2024-07-16 DIAGNOSIS — M79.671 RIGHT FOOT PAIN: ICD-10-CM

## 2024-07-16 DIAGNOSIS — M21.6X1 PRONATION DEFORMITY OF BOTH FEET: ICD-10-CM

## 2024-07-16 DIAGNOSIS — M79.671 RIGHT FOOT PAIN: Primary | ICD-10-CM

## 2024-07-16 PROCEDURE — 3078F DIAST BP <80 MM HG: CPT | Mod: CPTII,S$GLB,, | Performed by: STUDENT IN AN ORGANIZED HEALTH CARE EDUCATION/TRAINING PROGRAM

## 2024-07-16 PROCEDURE — 99999 PR PBB SHADOW E&M-EST. PATIENT-LVL IV: CPT | Mod: PBBFAC,,, | Performed by: STUDENT IN AN ORGANIZED HEALTH CARE EDUCATION/TRAINING PROGRAM

## 2024-07-16 PROCEDURE — 3074F SYST BP LT 130 MM HG: CPT | Mod: CPTII,S$GLB,, | Performed by: STUDENT IN AN ORGANIZED HEALTH CARE EDUCATION/TRAINING PROGRAM

## 2024-07-16 PROCEDURE — 73630 X-RAY EXAM OF FOOT: CPT | Mod: TC,PN,RT

## 2024-07-16 PROCEDURE — 99203 OFFICE O/P NEW LOW 30 MIN: CPT | Mod: S$GLB,,, | Performed by: STUDENT IN AN ORGANIZED HEALTH CARE EDUCATION/TRAINING PROGRAM

## 2024-07-16 PROCEDURE — 1159F MED LIST DOCD IN RCRD: CPT | Mod: CPTII,S$GLB,, | Performed by: STUDENT IN AN ORGANIZED HEALTH CARE EDUCATION/TRAINING PROGRAM

## 2024-07-16 PROCEDURE — 73630 X-RAY EXAM OF FOOT: CPT | Mod: 26,RT,, | Performed by: RADIOLOGY

## 2024-07-16 PROCEDURE — 3008F BODY MASS INDEX DOCD: CPT | Mod: CPTII,S$GLB,, | Performed by: STUDENT IN AN ORGANIZED HEALTH CARE EDUCATION/TRAINING PROGRAM

## 2024-07-16 NOTE — PATIENT INSTRUCTIONS
Tom Herzog 9 tennis shoes found at [employer] on [address] by [facility].     Satnam Morales with arch supports,.     Wanda Sebastian [place], [place].

## 2024-07-16 NOTE — PROGRESS NOTES
Subjective:     Patient ID: Sarahy Ma is a 64 y.o. female.    Chief Complaint: Foot Pain (Right foot throbbing pain )    Sarahy is a 64 y.o. female who presents to the podiatry clinic  with complaint of  right foot pain. Onset of the symptoms was several months ago. Precipitating event: none known. Current symptoms include:  pain, points to shaft of 5th metatarsal . Aggravating factors: any weight bearing. Symptoms have gradually worsened. Patient has had no prior foot problems. Evaluation to date: none. Treatment to date: none. Patients rates pain 4/10 on pain scale. Relates history of osteoporosis in her family. Also relates to lower back pain, inquiring about shoe and arch support recommendations.     Review of Systems   Constitutional: Negative for chills, decreased appetite, diaphoresis and fever.   HENT:  Negative for congestion and hearing loss.    Cardiovascular:  Negative for chest pain, claudication, leg swelling and syncope.   Respiratory:  Negative for cough and shortness of breath.    Skin:  Negative for color change, dry skin, flushing, itching, nail changes, poor wound healing and rash.   Musculoskeletal:  Positive for arthritis, back pain and joint pain.   Gastrointestinal:  Negative for nausea and vomiting.   Neurological:  Negative for focal weakness, paresthesias and weakness.   Psychiatric/Behavioral:  Negative for altered mental status. The patient is not nervous/anxious.         Objective:     Physical Exam  Constitutional:       General: She is not in acute distress.     Appearance: She is well-developed. She is not diaphoretic.   Cardiovascular:      Comments: Dorsalis pedis and posterior tibial pulses are within normal limits. Skin temperature is within normal limits. Toes are cool to touch and feet are warm proximally. Hair growth is within normal limits. Skin is normotrophic and without hyperpigmentation. No edema noted. No spider veins or varicosities noted, bilaterally.      Musculoskeletal:         General: Tenderness present.      Comments: Adequate joint range of motion without pain, limitation, nor crepitation to bilateral feet and ankle joints. Muscle strength is 5/5 in all groups bilaterally.    Rectus arches with dorsal bone spurs over TMTJ, reduction of arch on weightbearing.     Tenderness along right 5th metatarsal shaft.    Lymphadenopathy:      Comments: Negative lymphangitic streaking    Skin:     General: Skin is warm and dry.      Findings: No lesion.      Comments: Skin is warm and dry, no acute signs of infection noted. No open wounds, macerations or hyperkeratotic lesions, bilaterally.     Toenails are well trimmed and of normal morphology, bilaterally.    Neurological:      Mental Status: She is alert and oriented to person, place, and time.      Motor: No abnormal muscle tone.      Comments: Light touch within normal limits.    Psychiatric:         Behavior: Behavior normal.         Thought Content: Thought content normal.         Judgment: Judgment normal.           Assessment:      Encounter Diagnoses   Name Primary?    Right foot pain Yes    Pronation deformity of both feet      Plan:     Sarahy was seen today for foot pain.    Diagnoses and all orders for this visit:    Right foot pain  -     X-Ray Foot Complete Right; Future    Pronation deformity of both feet      I counseled the patient on her conditions, their implications and medical management.  Baseline xray ordered, consider MRI, concern for possible stress fracture  RICE, NSAIDs PRN pain  Supportive tennis shoes with arch supports at all times while ambulating  Return to clinic in 1 month or PRN

## 2024-07-28 DIAGNOSIS — K51.80 OTHER ULCERATIVE COLITIS WITHOUT COMPLICATION: ICD-10-CM

## 2024-07-28 RX ORDER — MESALAMINE 0.38 G/1
1.5 CAPSULE, EXTENDED RELEASE ORAL DAILY
Qty: 120 CAPSULE | Refills: 11 | Status: CANCELLED | OUTPATIENT
Start: 2024-07-28

## 2024-07-29 DIAGNOSIS — K51.80 OTHER ULCERATIVE COLITIS WITHOUT COMPLICATION: ICD-10-CM

## 2024-07-29 RX ORDER — MESALAMINE 0.38 G/1
1.5 CAPSULE, EXTENDED RELEASE ORAL DAILY
Qty: 120 CAPSULE | Refills: 11 | Status: SHIPPED | OUTPATIENT
Start: 2024-07-29

## 2024-08-27 ENCOUNTER — PATIENT MESSAGE (OUTPATIENT)
Dept: PRIMARY CARE CLINIC | Facility: CLINIC | Age: 64
End: 2024-08-27
Payer: COMMERCIAL

## 2024-08-27 DIAGNOSIS — Z00.00 WELL ADULT EXAM: Primary | ICD-10-CM

## 2024-09-11 DIAGNOSIS — I10 ESSENTIAL HYPERTENSION: ICD-10-CM

## 2024-09-11 RX ORDER — AMLODIPINE BESYLATE 5 MG/1
5 TABLET ORAL DAILY
Qty: 90 TABLET | Refills: 0 | Status: SHIPPED | OUTPATIENT
Start: 2024-09-11

## 2024-09-11 NOTE — TELEPHONE ENCOUNTER
Care Due:                  Date            Visit Type   Department     Provider  --------------------------------------------------------------------------------                                EP -                              PRIMARY      [facility] PRIMARY  Last Visit: 11-      CARE ([facility])   KARON Dixon                              EP -                              PRIMARY      [facility] PRIMARY  Next Visit: 11-      CARE ([facility])   KARON Dixon                                                            Last  Test          Frequency    Reason                     Performed    Due Date  --------------------------------------------------------------------------------    TSH.........  12 months..  SYNTHROID................  11- 11-    [facility] Embedded Care Due Messages. Reference number: [id number].   9/11/2024 12:22:56 AM CDT

## 2024-09-19 ENCOUNTER — TELEPHONE (OUTPATIENT)
Dept: GASTROENTEROLOGY | Facility: CLINIC | Age: 64
End: 2024-09-19
Payer: COMMERCIAL

## 2024-09-19 NOTE — TELEPHONE ENCOUNTER
Informed patient that there are no appts until Dec. Patient added to wait list. V/U    ----- Message from Aria Garcia sent at 9/19/2024 11:46 AM CDT -----  Type:  Same Day Appointment Request      Name of Caller:Pt   When is the first available appointment?n/a  Symptoms:ulcerative colitis   Best Call Back Number: [phone]  Additional Information:

## 2024-10-08 ENCOUNTER — OFFICE VISIT (OUTPATIENT)
Dept: OTOLARYNGOLOGY | Facility: CLINIC | Age: 64
End: 2024-10-08
Payer: COMMERCIAL

## 2024-10-08 VITALS
DIASTOLIC BLOOD PRESSURE: 71 MMHG | HEART RATE: 78 BPM | WEIGHT: 114.88 LBS | SYSTOLIC BLOOD PRESSURE: 106 MMHG | BODY MASS INDEX: 21.01 KG/M2

## 2024-10-08 DIAGNOSIS — J06.9 URI WITH COUGH AND CONGESTION: Primary | ICD-10-CM

## 2024-10-08 DIAGNOSIS — K51.919 ULCERATIVE COLITIS WITH COMPLICATION, UNSPECIFIED LOCATION: ICD-10-CM
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
## 2024-10-08 DIAGNOSIS — R49.0 DYSPHONIA: ICD-10-CM

## 2024-10-08 DIAGNOSIS — K11.7 XEROSTOMIA DUE TO RADIOTHERAPY: ICD-10-CM

## 2024-10-08 PROCEDURE — 3008F BODY MASS INDEX DOCD: CPT | Mod: CPTII,S$GLB,, | Performed by: PHYSICIAN ASSISTANT

## 2024-10-08 PROCEDURE — 99999 PR PBB SHADOW E&M-EST. PATIENT-LVL III: CPT | Mod: PBBFAC,,, | Performed by: PHYSICIAN ASSISTANT

## 2024-10-08 PROCEDURE — 3078F DIAST BP <80 MM HG: CPT | Mod: CPTII,S$GLB,, | Performed by: PHYSICIAN ASSISTANT

## 2024-10-08 PROCEDURE — 3074F SYST BP LT 130 MM HG: CPT | Mod: CPTII,S$GLB,, | Performed by: PHYSICIAN ASSISTANT

## 2024-10-08 PROCEDURE — 99214 OFFICE O/P EST MOD 30 MIN: CPT | Mod: S$GLB,,, | Performed by: PHYSICIAN ASSISTANT

## 2024-10-08 PROCEDURE — 1159F MED LIST DOCD IN RCRD: CPT | Mod: CPTII,S$GLB,, | Performed by: PHYSICIAN ASSISTANT

## 2024-10-08 RX ORDER — CEFDINIR 300 MG/1
300 CAPSULE ORAL 2 TIMES DAILY
Qty: 20 CAPSULE | Refills: 0 | Status: SHIPPED | OUTPATIENT
Start: 2024-10-08 | End: 2024-10-21

## 2024-10-08 NOTE — PROGRESS NOTES
Subjective:      Sarahy is a 64 y.o. female h/o tongue cancer s/p radiation+chemo, UC, GERD (on PPI)  who comes for follow-up of sinusitis.  Her last visit with me was on 3/14/2024.      Patient reports developing a headache 6 days ago which progressed to a sore throat, fevers, chills, maxillary pressure about 2 days ago.  Patient reports periodic sharp pain to her ears but denies any painful chewing.  She has been gargling with saltwater and sometimes has bloody phlegm.  She denies any grossly discolored nasal discharge.  She describes a headache like a band across her head.  Her symptoms feel different than prior episodes of COVID.    Per last office visit 3/14/2024 :  Patient reports developing flu-like symptoms about 6 weeks ago with associated increased mucus production, cough and mild facial pressure.  Symptoms persisted so was evaluated by her PCP about 2 weeks ago and started on a 5 day course of doxycycline.  Patient reports symptoms improved significantly but then restarted 2 days after completing antibiotics.  Patient does report associated nasal congestion, throat clearing, cough, fatigue, and dysphonia.     PLAN:  Suspect patient developed viral infection which led to the subacute maxillary sinusitis.  Patient recently underwent 5 days of doxycycline which is likely not long enough to completely treat the symptoms as evident on nasal endoscopy today.  I swabbed her nasal exudate for culture and will use the results to direct antibiotic therapy as indicated.  Start Cefdnir   Advised probiotics especially in setting of UC  START saline rinse at least once daily  CT sinus if symptoms do not improve    INITIAL office visit 9/6/2023 :  Patient reports recurrent symptoms of off-balance or seesaw symptoms daily for the last 6 weeks with associated occipital headache.  Patient has reported similar episodes in the past following radiation and chemo for tongue cancer, but now symptoms are more frequent.  Patient  denies any room spinning dizziness or vertigo.  Patient reports this is still different than prior episodes of vertigo.  Patient reports symptoms usually worse with moving her eyes in certain ways an with standing quickly.  Lasts all day.  Patient denies any ear pain, ear pressure, snoring, nasal obstruction, sneezing, rhinorrhea, or nasal obstruction.  Patient does report chronic postnasal drip for years which waxes and wanes.     Current sinonasal medications include none at present.  The last course of antibiotics was a long time ago.    She does not recall previously having allergy testing.  She denies a history of asthma.  She relates a history of reflux symptoms which is currently managed with pantoprazole.    She denies a diagnosis of obstructive sleep apnea.   She does not recall a prior history of nasal trauma.  She has not had sinonasal surgery.    She has had a tonsillectomy.  She is not a tobacco smoker.      1. Balance disorder  -     Laryngoscopy without evidence of sinus infection, polyps, or masses  -     Ear exam WNL  -     Do not suspect inner ear pathology  -     Advised eval by ophthalmologist  -     Cherelle's exercises provided  -     Will have patient evaluated by otologist and undergo VNG if symptoms persist  2. Chronic rhinitis  3. Hypertrophy of inferior nasal turbinate  Prescribed patient to START fluticasone propionate & azelastine and also educated patient on how to properly use nasal sprays  4. History of tongue cancer  Overview:  Diagnosed/treated at [facility] in 2009.  Reportedly Stage 3 at time of diagnosis => treated with XRT/chemotherapy.  5. Laryngopharyngeal reflux (LPR)              - discussed LPR and cutting back on coffee  6. Cervicogenic headache              - could be contributing to sxs of facial pressure and headache  7. 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 Dysphagia, unspecified type              - side effects of radiation              - will continue to  monitor    The patient's medications, allergies, past medical, surgical, social and family histories were reviewed and updated as appropriate.    A detailed review of systems was obtained with pertinent positives as per the above HPI, and otherwise negative.        Objective:     /71 (BP Location: Right arm, Patient Position: Sitting)   Pulse 78   Wt 52.1 kg (114 lb 13.8 oz)   BMI 21.01 kg/m²      Physical Exam  Vitals reviewed.   Constitutional:       Appearance: Normal appearance. She is well-developed.   HENT:      Head: Normocephalic and atraumatic.      Jaw: No trismus, tenderness or pain on movement.      Right Ear: Tympanic membrane, ear canal and external ear normal.      Left Ear: Tympanic membrane, ear canal and external ear normal.      Nose: No septal deviation, mucosal edema or rhinorrhea.      Right Nostril: No epistaxis.      Left Nostril: No epistaxis.      Right Turbinates: Enlarged.      Left Turbinates: Enlarged.      Right Sinus: Maxillary sinus tenderness present. No frontal sinus tenderness.      Left Sinus: Maxillary sinus tenderness present. No frontal sinus tenderness.      Mouth/Throat:      Lips: Pink. No lesions.      Tongue: No lesions. Tongue does not deviate from midline.      Palate: No mass and lesions.      Pharynx: Oropharynx is clear. Uvula midline. No pharyngeal swelling, oropharyngeal exudate, posterior oropharyngeal erythema or uvula swelling.      Tonsils: No tonsillar exudate or tonsillar abscesses. 0 on the right. 0 on the left.   Eyes:      Extraocular Movements: Extraocular movements intact.      Conjunctiva/sclera: Conjunctivae normal.   Neck:      Thyroid: No thyromegaly or thyroid tenderness.   Lymphadenopathy:      Cervical: No cervical adenopathy.   Psychiatric:         Mood and Affect: Mood normal.         Behavior: Behavior normal. Behavior is cooperative.          Procedure    None    Data Reviewed    WBC (K/uL)   Date Value   11/27/2023 5.94  "    Eosinophil % (%)   Date Value   11/27/2023 2.2     Eos # (K/uL)   Date Value   11/27/2023 0.1     Platelets (K/uL)   Date Value   11/27/2023 182     Glucose (mg/dL)   Date Value   11/27/2023 89     No results found for: "IGE"    MICRO:  3/14/24 Few H. Flu and S. Aureus    Assessment:     1. URI with cough and congestion    2. Xerostomia due to radiotherapy    3. Ulcerative colitis with complication, unspecified location    4. Dysphonia         Plan:     Appear to be viral in nature, but with patient's history will prescribe her cefdinir.  Advised patient not to take this unless symptoms do not improve in the next week.  She is scheduled to go on a trip this weekend.  Start saline rinse.  Continue Flonase  She will return to clinic if symptoms do not improve with antibiotics    She will follow up as needed  I had a discussion with the patient regarding her condition and the further workup and management options.    All questions were answered, and the patient is in agreement with the above.     Disclaimer:  This note may have been prepared utilizing voice recognition software which may result in occasional typographical errors in the text such as sound alike words.   If further clarification is needed, please contact the ENT department of [facility].    "

## 2024-10-27 ENCOUNTER — PATIENT MESSAGE (OUTPATIENT)
Dept: PRIMARY CARE CLINIC | Facility: CLINIC | Age: 64
End: 2024-10-27
Payer: COMMERCIAL

## 2024-11-04 RX ORDER — GABAPENTIN 300 MG/1
300 CAPSULE ORAL 3 TIMES DAILY
Qty: 90 CAPSULE | Refills: 11 | Status: SHIPPED | OUTPATIENT
Start: 2024-11-04

## 2024-11-05 ENCOUNTER — OFFICE VISIT (OUTPATIENT)
Dept: PRIMARY CARE CLINIC | Facility: CLINIC | Age: 64
End: 2024-11-05
Payer: COMMERCIAL

## 2024-11-05 VITALS
BODY MASS INDEX: 21.14 KG/M2 | OXYGEN SATURATION: 98 % | WEIGHT: 114.88 LBS | HEART RATE: 83 BPM | HEIGHT: 62 IN | DIASTOLIC BLOOD PRESSURE: 70 MMHG | SYSTOLIC BLOOD PRESSURE: 110 MMHG

## 2024-11-05 DIAGNOSIS — E89.0 POSTABLATIVE HYPOTHYROIDISM: ICD-10-CM

## 2024-11-05 DIAGNOSIS — I10 ESSENTIAL HYPERTENSION: ICD-10-CM

## 2024-11-05 DIAGNOSIS — Z12.31 ENCOUNTER FOR SCREENING MAMMOGRAM FOR MALIGNANT NEOPLASM OF BREAST: ICD-10-CM

## 2024-11-05 DIAGNOSIS — Z00.00 ANNUAL PHYSICAL EXAM: Primary | ICD-10-CM

## 2024-11-05 DIAGNOSIS — Z23 NEED FOR INFLUENZA VACCINATION: ICD-10-CM

## 2024-11-05 DIAGNOSIS — K51.80 OTHER ULCERATIVE COLITIS WITHOUT COMPLICATION: ICD-10-CM

## 2024-11-05 PROCEDURE — 90656 IIV3 VACC NO PRSV 0.5 ML IM: CPT | Mod: S$GLB,,, | Performed by: INTERNAL MEDICINE

## 2024-11-05 PROCEDURE — 99999 PR PBB SHADOW E&M-EST. PATIENT-LVL III: CPT | Mod: PBBFAC,,, | Performed by: INTERNAL MEDICINE

## 2024-11-05 PROCEDURE — 90471 IMMUNIZATION ADMIN: CPT | Mod: S$GLB,,, | Performed by: INTERNAL MEDICINE

## 2024-11-05 PROCEDURE — 3078F DIAST BP <80 MM HG: CPT | Mod: CPTII,S$GLB,, | Performed by: INTERNAL MEDICINE

## 2024-11-05 PROCEDURE — 3074F SYST BP LT 130 MM HG: CPT | Mod: CPTII,S$GLB,, | Performed by: INTERNAL MEDICINE

## 2024-11-05 PROCEDURE — 1159F MED LIST DOCD IN RCRD: CPT | Mod: CPTII,S$GLB,, | Performed by: INTERNAL MEDICINE

## 2024-11-05 PROCEDURE — 99396 PREV VISIT EST AGE 40-64: CPT | Mod: 25,S$GLB,, | Performed by: INTERNAL MEDICINE

## 2024-11-05 PROCEDURE — 3008F BODY MASS INDEX DOCD: CPT | Mod: CPTII,S$GLB,, | Performed by: INTERNAL MEDICINE

## 2024-11-05 NOTE — PROGRESS NOTES
[facility] Note    Chief Complaint      Chief Complaint   Patient presents with    Annual Exam     History of Present Illness      Sarahy Ma is a 64 y.o. female who presents today for Annual preventative visit.  Patient comes to appointment alone.  ENT: [facility]    Just got off abx for sinusitis with ENT.  Starting mucinex today, doing flonase.  No fever/chills.  Not doing antihistamine because it dries her out. Occasional cough, no SOB. Doing Breo daily.    Problem List Items Addressed This Visit       Essential hypertension    Current Assessment & Plan     BP low in AM on Norvasc 1/2 tablet, no SOB/CP/HA.         Postablative hypothyroidism    Current Assessment & Plan     Stable on synthroid 75 mcg daily, no issues at present.         Ulcerative colitis    Current Assessment & Plan     On mesalamine, works well for her.  Sees Dr. Forte.  Cscope 2021.  Has been doing better.          Other Visit Diagnoses       Annual physical exam    -  Primary    Need for influenza vaccination        Relevant Medications    influenza (Flulaval, Fluzone, Fluarix) 45 mcg/0.5 mL IM vaccine (> or = 6 mo) 0.5 mL (Start on 2024 10:45 AM)    Encounter for screening mammogram for malignant neoplasm of breast        Relevant Orders    Mammo Digital Screening Bilat w/ Oren                Health Maintenance   Topic Date Due    Shingles Vaccine (1 of 2) Never done    Mammogram  2025    Colorectal Cancer Screening  2026    TETANUS VACCINE  2027    Lipid Panel  10/29/2029    Hepatitis C Screening  Completed       Past Medical History:   Diagnosis Date    DDD (degenerative disc disease), lumbar     Hypertension     Postablative hypothyroidism     secondary to the radiation treatment for tongue cancer - treated by oncologist    Sciatica     Tongue cancer     Ulcerative colitis     Treated by Dr. Tobias       Past Surgical History:   Procedure Laterality Date     SECTION       CHOLECYSTECTOMY  07/14/2016    removed    CHOLECYSTECTOMY  07/2016    COLONOSCOPY      Dr. Tobias    COLONOSCOPY N/A 02/19/2021    Procedure: COLONOSCOPY Suprep;  Surgeon: Chetan Forte MD;  Location: [facility];  Service: Endoscopy;  Laterality: N/A;    feeding tube      groin surgery  08/27/2017    swollen lump node    HYSTERECTOMY      OOPHORECTOMY      PORTACATH PLACEMENT      TONSILLECTOMY      TRANSFORAMINAL EPIDURAL INJECTION OF STEROID Right 12/21/2021    Procedure: LUMBAR TRANSFORAMINAL RIGHT L2/3 AND L3/4 DIRECT REFERRAL ;  Surgeon: Tana Beckwith MD;  Location: [facility];  Service: Pain Management;  Laterality: Right;    TUBAL LIGATION         family history includes Arthritis in her father and mother; Cancer in her paternal grandmother; Diabetes in her father; Early death in her brother; Heart attack in her father; Heart disease in her father; Heart failure in her father; Hyperlipidemia in her father; Hypertension in her father and mother; Thyroid disease in her brother, brother, sister, and sister.    Social History     Tobacco Use    Smoking status: Former     Current packs/day: 0.00     Average packs/day: 1 pack/day for 28.7 years (28.7 ttl pk-yrs)     Types: Cigarettes     Start date: 8/18/1980     Quit date: 5/15/2009     Years since quitting: 15.4     Passive exposure: Never    Smokeless tobacco: Never   Substance Use Topics    Alcohol use: Not Currently     Comment: occasionally    Drug use: Never       Review of Systems   Constitutional:  Negative for chills and fever.   Respiratory:  Negative for cough and shortness of breath.    Cardiovascular:  Negative for chest pain and palpitations.   Gastrointestinal:  Negative for constipation, diarrhea, nausea and vomiting.   Genitourinary:  Negative for dysuria and hematuria.   Musculoskeletal:  Negative for falls.   Neurological:  Negative for headaches.        Outpatient Encounter Medications as of 11/5/2024   Medication Sig Dispense Refill     estradioL (ESTRACE) 0.01 % (0.1 mg/gram) vaginal cream Place 1 g vaginally twice a week. 42.5 g 3    fluticasone furoate-vilanteroL (BREO ELLIPTA) 100-25 mcg/dose diskus inhaler Inhale 1 puff into the lungs once daily. Controller 180 each 2    fluticasone propionate (FLONASE) 50 mcg/actuation nasal spray SPRAY 2 SPRAYS BY EACH NOSTRIL ROUTE ONCE DAILY. 16 mL 11    gabapentin (NEURONTIN) 300 MG capsule TAKE 1 CAPSULE BY MOUTH THREE TIMES A DAY 90 capsule 11    meclizine (ANTIVERT) 25 mg tablet Take 1 tablet (25 mg total) by mouth 3 (three) times daily as needed for Dizziness. 20 tablet 0    mesalamine (APRISO) 0.375 gram Cp24 Take 4 capsules (1.5 g total) by mouth once daily. Can taper to 2 capsules per day if symptoms controlled 120 capsule 11    ondansetron (ZOFRAN-ODT) 4 MG TbDL Take 1 tablet (4 mg total) by mouth every 8 (eight) hours as needed (nausea). 10 tablet 0    pantoprazole (PROTONIX) 40 MG tablet TAKE 1 TABLET BY MOUTH EVERY DAY 90 tablet 3    SYNTHROID 75 mcg tablet TAKE 1 TABLET BY MOUTH BEFORE BREAKFAST. 90 tablet 1    [DISCONTINUED] amLODIPine (NORVASC) 5 MG tablet Take 1 tablet (5 mg total) by mouth once daily. Takes 2.5 mg 90 tablet 0    [] cefdinir (OMNICEF) 300 MG capsule Take 1 capsule (300 mg total) by mouth 2 (two) times daily. for 10 days 20 capsule 0    [DISCONTINUED] albuterol (VENTOLIN HFA) 90 mcg/actuation inhaler Inhale 2 puffs into the lungs every 6 (six) hours as needed for Wheezing. Rescue 18 g 0    [DISCONTINUED] gabapentin (NEURONTIN) 300 MG capsule Take 1 capsule (300 mg total) by mouth 3 (three) times daily. 90 capsule 11    [DISCONTINUED] LIDOcaine (LIDODERM) 5 % Place 1 patch onto the skin daily as needed (pain). Remove & Discard patch within 12 hours or as directed by MD 10 patch 0     Facility-Administered Encounter Medications as of 2024   Medication Dose Route Frequency Provider Last Rate Last Admin    influenza (Flulaval, Fluzone, Fluarix) 45 mcg/0.5 mL IM  "vaccine (> or = 6 mo) 0.5 mL  0.5 mL Intramuscular 1 time in Clinic/HOD             Review of patient's allergies indicates:   Allergen Reactions    Ciprofloxacin Rash    Amoxicillin      Other reaction(s): fever blisters  Tolerates cefdinir    Nsaids (non-steroidal anti-inflammatory drug)      Other reaction(s): nausea, stomach issues       Physical Exam      Vital Signs  Pulse: 83  SpO2: 98 %  BP: 110/70  Pain Score: 0-No pain  Height and Weight  Height: 5' 2" (157.5 cm)  Weight: 52.1 kg (114 lb 13.8 oz)  BSA (Calculated - sq m): 1.51 sq meters  BMI (Calculated): 21  Weight in (lb) to have BMI = 25: 136.4]    Physical Exam  Constitutional:       Appearance: She is well-developed.   HENT:      Head: Normocephalic and atraumatic.   Cardiovascular:      Rate and Rhythm: Normal rate and regular rhythm.      Heart sounds: Normal heart sounds. No murmur heard.  Pulmonary:      Effort: Pulmonary effort is normal. No respiratory distress.      Breath sounds: Normal breath sounds.   Abdominal:      General: There is no distension.      Palpations: Abdomen is soft.      Tenderness: There is no abdominal tenderness. There is no guarding.   Skin:     General: Skin is warm and dry.   Neurological:      Mental Status: She is alert. Mental status is at baseline.   Psychiatric:         Behavior: Behavior normal.          Laboratory:  CBC:  Recent Labs   Lab Result Units 10/29/24  1022   WBC K/uL 4.12   RBC M/uL 4.40   Hemoglobin g/dL 13.3   Hematocrit % 39.2   Platelets K/uL 170   MCV fL 89   MCH pg 30.2   MCHC g/dL 33.9       CMP:  Recent Labs   Lab Result Units 10/29/24  1022   Glucose mg/dL 85   Calcium mg/dL 9.2   Albumin g/dL 3.7   Total Protein g/dL 6.5   Sodium mmol/L 141   Potassium mmol/L 3.9   CO2 mmol/L 25   Chloride mmol/L 107   BUN mg/dL 12   Alkaline Phosphatase U/L 79   ALT U/L 11   AST U/L 14   Total Bilirubin mg/dL 0.7       URINALYSIS:  No results for input(s): "COLORU", "CLARITYU", "SPECGRAV", "PHUR", " ""PROTEINUA", "GLUCOSEU", "BILIRUBINCON", "BLOODU", "WBCU", "RBCU", "BACTERIA", "MUCUS", "NITRITE", "LEUKOCYTESUR", "UROBILINOGEN", "HYALINECASTS" in the last 2160 hours.     LIPIDS:  Recent Labs   Lab Result Units 10/29/24  1022 10/29/24  1023   TSH uIU/mL  --  2.653   HDL mg/dL 70  --    Cholesterol mg/dL 200*  --    Triglycerides mg/dL 89  --    LDL Cholesterol mg/dL 112.2  --    HDL/Cholesterol Ratio % 35.0  --    Non-HDL Cholesterol mg/dL 130  --    Total Cholesterol/HDL Ratio  2.9  --      TSH:  Recent Labs   Lab Result Units 10/29/24  1023   TSH uIU/mL 2.653     A1C:  No results for input(s): "HGBA1C" in the last 2160 hours.      Radiology:  Mammo Digital Screening Bilat w/ Oren    Result Date: 3/24/2022  Result:  Mammo Digital Screening Bilat w/ Oren    History:  Patient is 61 y.o. and is seen for a screening mammogram.      Films Compared:  Compared to: 02/26/2021 Mammo Digital Screening Bilat w/ Oren and   02/17/2020 Mammo Digital Screening Bilat w/ Oren     Findings:   This procedure was performed using tomosynthesis.   Computer-aided detection was utilized in the interpretation of this   examination.    The breasts are heterogeneously dense, which may obscure small masses.   There is no evidence of suspicious masses, microcalcifications or   architectural distortion.           Assessment/Plan     Sarahy Ma is a 64 y.o.female with:    1. Annual physical exam    2. Essential hypertension    3. Postablative hypothyroidism    4. Other ulcerative colitis without complication    5. Need for influenza vaccination  - influenza (Flulaval, Fluzone, Fluarix) 45 mcg/0.5 mL IM vaccine (> or = 6 mo) 0.5 mL    6. Encounter for screening mammogram for malignant neoplasm of breast  - Mammo Digital Screening Bilat w/ Oren; Future    -start antihistamine  -Stop amlodipine  -flu shot today  -Continue current medications and maintain follow up with specialists.    -Follow up in about 1 year (around 11/5/2025) for " Annual Visit.       Barb Dixon MD  [facility]

## 2024-12-14 DIAGNOSIS — E89.0 POSTABLATIVE HYPOTHYROIDISM: ICD-10-CM

## 2024-12-14 RX ORDER — LEVOTHYROXINE SODIUM 75 UG/1
75 TABLET ORAL
Qty: 90 TABLET | Refills: 3 | Status: SHIPPED | OUTPATIENT
Start: 2024-12-14

## 2024-12-14 NOTE — TELEPHONE ENCOUNTER
No care due was identified.  [facility] Embedded Care Due Messages. Reference number: [id number].   12/14/2024 1:00:03 AM CST

## 2024-12-14 NOTE — TELEPHONE ENCOUNTER
Refill Decision Note   Sarahy Ma  is requesting a refill authorization.  Brief Assessment and Rationale for Refill:  Approve     Medication Therapy Plan:         Comments:     Note composed:1:27 PM 12/14/2024

## 2025-01-24 ENCOUNTER — PATIENT MESSAGE (OUTPATIENT)
Dept: PRIMARY CARE CLINIC | Facility: CLINIC | Age: 65
End: 2025-01-24
Payer: COMMERCIAL

## 2025-01-24 ENCOUNTER — PATIENT MESSAGE (OUTPATIENT)
Dept: GASTROENTEROLOGY | Facility: CLINIC | Age: 65
End: 2025-01-24
Payer: COMMERCIAL

## 2025-02-03 ENCOUNTER — TELEPHONE (OUTPATIENT)
Dept: GASTROENTEROLOGY | Facility: CLINIC | Age: 65
End: 2025-02-03
Payer: COMMERCIAL

## 2025-02-03 NOTE — TELEPHONE ENCOUNTER
Attempted to contact patient to change appt date on 02/28 per provider request. LVM and call back number

## 2025-02-06 DIAGNOSIS — J31.0 CHRONIC RHINITIS: ICD-10-CM

## 2025-02-06 RX ORDER — FLUTICASONE PROPIONATE 50 MCG
SPRAY, SUSPENSION (ML) NASAL
Qty: 48 ML | Refills: 3 | Status: SHIPPED | OUTPATIENT
Start: 2025-02-06

## 2025-03-13 ENCOUNTER — TELEPHONE (OUTPATIENT)
Dept: DERMATOLOGY | Facility: CLINIC | Age: 65
End: 2025-03-13
Payer: COMMERCIAL

## 2025-03-13 ENCOUNTER — OFFICE VISIT (OUTPATIENT)
Dept: DERMATOLOGY | Facility: CLINIC | Age: 65
End: 2025-03-13
Payer: COMMERCIAL

## 2025-03-13 DIAGNOSIS — L98.8 RHYTIDES: ICD-10-CM

## 2025-03-13 DIAGNOSIS — Z12.83 SCREENING EXAM FOR SKIN CANCER: ICD-10-CM

## 2025-03-13 DIAGNOSIS — D22.9 MULTIPLE BENIGN NEVI: Primary | ICD-10-CM

## 2025-03-13 DIAGNOSIS — L81.4 LENTIGINES: ICD-10-CM

## 2025-03-13 DIAGNOSIS — L73.8 SEBACEOUS HYPERPLASIA: ICD-10-CM

## 2025-03-13 PROCEDURE — 1160F RVW MEDS BY RX/DR IN RCRD: CPT | Mod: CPTII,S$GLB,, | Performed by: STUDENT IN AN ORGANIZED HEALTH CARE EDUCATION/TRAINING PROGRAM

## 2025-03-13 PROCEDURE — 99999 PR PBB SHADOW E&M-EST. PATIENT-LVL III: CPT | Mod: PBBFAC,,, | Performed by: STUDENT IN AN ORGANIZED HEALTH CARE EDUCATION/TRAINING PROGRAM

## 2025-03-13 PROCEDURE — 99203 OFFICE O/P NEW LOW 30 MIN: CPT | Mod: S$GLB,,, | Performed by: STUDENT IN AN ORGANIZED HEALTH CARE EDUCATION/TRAINING PROGRAM

## 2025-03-13 PROCEDURE — 1159F MED LIST DOCD IN RCRD: CPT | Mod: CPTII,S$GLB,, | Performed by: STUDENT IN AN ORGANIZED HEALTH CARE EDUCATION/TRAINING PROGRAM

## 2025-03-13 NOTE — PROGRESS NOTES
Subjective:      Patient ID:  Sarahy Ma is a 64 y.o. female who presents for   Chief Complaint   Patient presents with    Skin Check     TBSE      Sarahy Ma is a 64 y.o. female who presents for: FBSE screening exam for skin cancer.    New patient    Pt has no specific concerns today.    Pt wants to establish care and go over her skin care routine.     Remote 2009 h/o tongue cancer s/p radiation+chemo     Pertinent history:  History of blistering sunburns: No  History of tanning bed use: yes  Family history of melanoma: No  Personal history of mole removal: No  Personal history of skin cancer: No         Review of Systems   Skin:  Positive for daily sunscreen use, activity-related sunscreen use and wears hat. Negative for recent sunburn.   Hematologic/Lymphatic: Bruises/bleeds easily.       Objective:   Physical Exam   Constitutional: She appears well-developed and well-nourished. No distress.   Neurological: She is alert and oriented to person, place, and time. She is not disoriented.   Psychiatric: She has a normal mood and affect.   Skin:   Areas Examined (abnormalities noted in diagram):   Scalp / Hair Palpated and Inspected  Head / Face Inspection Performed  Neck Inspection Performed  Chest / Axilla Inspection Performed  Abdomen Inspection Performed  Genitals / Buttocks / Groin Inspection Performed  Back Inspection Performed  RUE Inspected  LUE Inspection Performed  RLE Inspected  LLE Inspection Performed  Nails and Digits Inspection Performed                 Diagram Legend     Erythematous scaling macule/papule c/w actinic keratosis       Vascular papule c/w angioma      Pigmented verrucoid papule/plaque c/w seborrheic keratosis      Yellow umbilicated papule c/w sebaceous hyperplasia      Irregularly shaped tan macule c/w lentigo     1-2 mm smooth white papules consistent with Milia      Movable subcutaneous cyst with punctum c/w epidermal inclusion cyst      Subcutaneous movable cyst c/w  pilar cyst      Firm pink to brown papule c/w dermatofibroma      Pedunculated fleshy papule(s) c/w skin tag(s)      Evenly pigmented macule c/w junctional nevus     Mildly variegated pigmented, slightly irregular-bordered macule c/w mildly atypical nevus      Flesh colored to evenly pigmented papule c/w intradermal nevus       Pink pearly papule/plaque c/w basal cell carcinoma      Erythematous hyperkeratotic cursted plaque c/w SCC      Surgical scar with no sign of skin cancer recurrence      Open and closed comedones      Inflammatory papules and pustules      Verrucoid papule consistent consistent with wart     Erythematous eczematous patches and plaques     Dystrophic onycholytic nail with subungual debris c/w onychomycosis     Umbilicated papule    Erythematous-base heme-crusted tan verrucoid plaque consistent with inflamed seborrheic keratosis     Erythematous Silvery Scaling Plaque c/w Psoriasis     See annotation      Assessment / Plan:        Multiple benign nevi  Sebaceous hyperplasia  Lentigines  Reassurance    Screening exam for skin cancer  Total body skin examination performed today including at least 12 points as noted in physical examination. No lesions suspicious for malignancy noted.    Recommend daily sun protection/avoidance, use of at least SPF 30, broad spectrum sunscreen (OTC drug), skin self examinations, and routine physician surveillance to optimize early detection    h/o tongue cancer s/p radiation+chemo     Rhytides  Consider consultation with cosmetic dermatologist specializing in lasers- she may benefit from CO2 if candidate due to deep perioral rhytides  Provided her AVS written order of skin care routine including Vitamin C, sunscreen, and topical retinoid    RTC 1 year, sooner prn

## 2025-03-13 NOTE — TELEPHONE ENCOUNTER
----- Message from Bowen Hall sent at 3/13/2025 10:39 AM CDT -----  Regarding: appt access  PATIENT CALLPt called to schedule laser consultation. Please call back at [phone]

## 2025-03-13 NOTE — PATIENT INSTRUCTIONS
AM:    Vanicream gentle cleanser  Vanicream Vitamin C serum  Vanicream moisturizer with SPF 30 +     PM:  Micellar water cleanser to take off makeup  Vanicream gentle cleanser (double cleanse)  Tretinoin   Vanicream facial moisturizer     Consider laser consultation: [facility], [facility] or plastic surgeons  CO2 laser or other fractional ablative?

## 2025-03-14 ENCOUNTER — TELEPHONE (OUTPATIENT)
Dept: PRIMARY CARE CLINIC | Facility: CLINIC | Age: 65
End: 2025-03-14
Payer: COMMERCIAL

## 2025-03-17 ENCOUNTER — OFFICE VISIT (OUTPATIENT)
Dept: OTOLARYNGOLOGY | Facility: CLINIC | Age: 65
End: 2025-03-17
Payer: COMMERCIAL

## 2025-03-17 ENCOUNTER — TELEPHONE (OUTPATIENT)
Dept: OTOLARYNGOLOGY | Facility: CLINIC | Age: 65
End: 2025-03-17
Payer: COMMERCIAL

## 2025-03-17 VITALS
DIASTOLIC BLOOD PRESSURE: 86 MMHG | BODY MASS INDEX: 21.09 KG/M2 | SYSTOLIC BLOOD PRESSURE: 127 MMHG | WEIGHT: 115.31 LBS | HEART RATE: 81 BPM

## 2025-03-17 DIAGNOSIS — J01.90 ACUTE RHINOSINUSITIS: Primary | ICD-10-CM

## 2025-03-17 DIAGNOSIS — H93.8X3 EAR FULLNESS, BILATERAL: ICD-10-CM

## 2025-03-17 DIAGNOSIS — J34.3 HYPERTROPHY OF INFERIOR NASAL TURBINATE: ICD-10-CM

## 2025-03-17 DIAGNOSIS — K51.919 ULCERATIVE COLITIS WITH COMPLICATION, UNSPECIFIED LOCATION: ICD-10-CM

## 2025-03-17 PROCEDURE — 99213 OFFICE O/P EST LOW 20 MIN: CPT | Mod: S$GLB,,, | Performed by: PHYSICIAN ASSISTANT

## 2025-03-17 PROCEDURE — 3074F SYST BP LT 130 MM HG: CPT | Mod: CPTII,S$GLB,, | Performed by: PHYSICIAN ASSISTANT

## 2025-03-17 PROCEDURE — 3079F DIAST BP 80-89 MM HG: CPT | Mod: CPTII,S$GLB,, | Performed by: PHYSICIAN ASSISTANT

## 2025-03-17 PROCEDURE — 99999 PR PBB SHADOW E&M-EST. PATIENT-LVL III: CPT | Mod: PBBFAC,,, | Performed by: PHYSICIAN ASSISTANT

## 2025-03-17 PROCEDURE — 3008F BODY MASS INDEX DOCD: CPT | Mod: CPTII,S$GLB,, | Performed by: PHYSICIAN ASSISTANT

## 2025-03-17 PROCEDURE — 1159F MED LIST DOCD IN RCRD: CPT | Mod: CPTII,S$GLB,, | Performed by: PHYSICIAN ASSISTANT

## 2025-03-17 RX ORDER — CEFDINIR 300 MG/1
300 CAPSULE ORAL 2 TIMES DAILY
Qty: 20 EACH | Refills: 0 | Status: SHIPPED | OUTPATIENT
Start: 2025-03-17 | End: 2025-03-27

## 2025-03-17 NOTE — PROGRESS NOTES
Subjective:      Sarahy is a 64 y.o. female with  h/o tongue cancer s/p radiation+chemo, UC, GERD (on PPI) who comes for follow-up of sinusitis.  Her last visit with me was on 10/8/2024.      Patient reports developing significant sore throat with associated cough and facial pressure and rhinorrhea about 1-1/2 weeks ago.  Symptoms overall resolved but then this past week she developed significant facial pressure, which is worse with leaning forward.  She has used Mucinex saline rinse, and Flonase without significant relief of symptoms.    Per last office visit 10/8/2024:  Patient reports developing a headache 6 days ago which progressed to a sore throat, fevers, chills, maxillary pressure about 2 days ago.  Patient reports periodic sharp pain to her ears but denies any painful chewing.  She has been gargling with saltwater and sometimes has bloody phlegm.  She denies any grossly discolored nasal discharge.  She describes a headache like a band across her head.  Her symptoms feel different than prior episodes of COVID.     Appear to be viral in nature, but with patient's history will prescribe her cefdinir.  Advised patient not to take this unless symptoms do not improve in the next week.  She is scheduled to go on a trip this weekend.  Start saline rinse.  Continue Flonase  She will return to clinic if symptoms do not improve with antibiotics    Per last office visit 3/14/2024 :  Patient reports developing flu-like symptoms about 6 weeks ago with associated increased mucus production, cough and mild facial pressure.  Symptoms persisted so was evaluated by her PCP about 2 weeks ago and started on a 5 day course of doxycycline.  Patient reports symptoms improved significantly but then restarted 2 days after completing antibiotics.  Patient does report associated nasal congestion, throat clearing, cough, fatigue, and dysphonia.     PLAN:  Suspect patient developed viral infection which led to the subacute maxillary  sinusitis.  Patient recently underwent 5 days of doxycycline which is likely not long enough to completely treat the symptoms as evident on nasal endoscopy today.  I swabbed her nasal exudate for culture and will use the results to direct antibiotic therapy as indicated.  Start Cefdnir   Advised probiotics especially in setting of UC  START saline rinse at least once daily  CT sinus if symptoms do not improve     INITIAL office visit 9/6/2023 :  Patient reports recurrent symptoms of off-balance or seesaw symptoms daily for the last 6 weeks with associated occipital headache.  Patient has reported similar episodes in the past following radiation and chemo for tongue cancer, but now symptoms are more frequent.  Patient denies any room spinning dizziness or vertigo.  Patient reports this is still different than prior episodes of vertigo.  Patient reports symptoms usually worse with moving her eyes in certain ways an with standing quickly.  Lasts all day.  Patient denies any ear pain, ear pressure, snoring, nasal obstruction, sneezing, rhinorrhea, or nasal obstruction.  Patient does report chronic postnasal drip for years which waxes and wanes.     Current sinonasal medications include none at present.  The last course of antibiotics was a long time ago.    She does not recall previously having allergy testing.  She denies a history of asthma.  She relates a history of reflux symptoms which is currently managed with pantoprazole.    She denies a diagnosis of obstructive sleep apnea.   She does not recall a prior history of nasal trauma.  She has not had sinonasal surgery.    She has had a tonsillectomy.  She is not a tobacco smoker.      1. Balance disorder  -     Laryngoscopy without evidence of sinus infection, polyps, or masses  -     Ear exam WNL  -     Do not suspect inner ear pathology  -     Advised eval by ophthalmologist  -     Cherelle's exercises provided  -     Will have patient evaluated by otologist and  undergo VNG if symptoms persist  2. Chronic rhinitis  3. Hypertrophy of inferior nasal turbinate  Prescribed patient to START fluticasone propionate & azelastine and also educated patient on how to properly use nasal sprays  4. History of tongue cancer  Overview:  Diagnosed/treated at [facility] in 2009.  Reportedly Stage 3 at time of diagnosis => treated with XRT/chemotherapy.  5. Laryngopharyngeal reflux (LPR)              - discussed LPR and cutting back on coffee  6. Cervicogenic headache              - could be contributing to sxs of facial pressure and headache  7. 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 Dysphagia, unspecified type              - side effects of radiation              - will continue to monitor    The patient's medications, allergies, past medical, surgical, social and family histories were reviewed and updated as appropriate.    A detailed review of systems was obtained with pertinent positives as per the above HPI, and otherwise negative.        Objective:     /86 (BP Location: Left arm, Patient Position: Sitting)   Pulse 81   Wt 52.3 kg (115 lb 4.8 oz)   BMI 21.09 kg/m²      Physical Exam  Constitutional:       Appearance: Normal appearance.   HENT:      Head: Normocephalic and atraumatic.      Right Ear: Ear canal and external ear normal. Tympanic membrane is scarred.      Left Ear: Ear canal and external ear normal. Tympanic membrane is scarred.      Nose: No mucosal edema.      Right Turbinates: Enlarged.      Left Turbinates: Enlarged.      Right Sinus: Maxillary sinus tenderness present. No frontal sinus tenderness.      Left Sinus: Maxillary sinus tenderness present. No frontal sinus tenderness.      Mouth/Throat:      Lips: Pink.      Mouth: Mucous membranes are moist.      Tongue: No lesions. Tongue does not deviate from midline.      Palate: No mass and lesions.      Pharynx: Oropharynx is clear. Uvula midline.      Tonsils: No tonsillar exudate or tonsillar abscesses.       "Comments: S/p uvulotonsillectomy  Pulmonary:      Effort: Pulmonary effort is normal.   Neurological:      Mental Status: She is alert.          Procedure    None    Data Reviewed    WBC (K/uL)   Date Value   10/29/2024 4.12     Eosinophil % (%)   Date Value   10/29/2024 2.7     Eos # (K/uL)   Date Value   10/29/2024 0.1     Platelets (K/uL)   Date Value   10/29/2024 170     Glucose (mg/dL)   Date Value   10/29/2024 85     No results found for: "IGE"    Assessment:     1. Acute rhinosinusitis    2. Ulcerative colitis with complication, unspecified location    3. Hypertrophy of inferior nasal turbinate    4. Ear fullness, bilateral         Plan:     Rx Cefdinir  C/w flonase, saline rinse    Referred to [facility] for audiogram and otology re: ear fullness    She will f/u prn  I had a discussion with the patient regarding her condition and the further workup and management options.    All questions were answered, and the patient is in agreement with the above.     Disclaimer:  This note may have been prepared utilizing voice recognition software which may result in occasional typographical errors in the text such as sound alike words.   If further clarification is needed, please contact the ENT department of [facility].    "

## 2025-03-17 NOTE — TELEPHONE ENCOUNTER
----- Message from Med Assistant Hdz sent at 3/17/2025  4:58 PM CDT -----    ----- Message -----  From: Alissa Valenzuela MA  Sent: 3/17/2025   3:58 PM CDT  To: Jayde Myrick Staff; Madelyn Dove Staff    Blake QUIROZ is referring patient for ear fullness/ hearing loss. Please call patient to schedule appointment. Thanks so much!

## 2025-03-18 ENCOUNTER — TELEPHONE (OUTPATIENT)
Dept: DERMATOLOGY | Facility: CLINIC | Age: 65
End: 2025-03-18
Payer: COMMERCIAL

## 2025-03-18 ENCOUNTER — PATIENT MESSAGE (OUTPATIENT)
Dept: OTOLARYNGOLOGY | Facility: CLINIC | Age: 65
End: 2025-03-18
Payer: COMMERCIAL

## 2025-03-18 DIAGNOSIS — R42 DIZZINESS: Primary | ICD-10-CM

## 2025-03-18 RX ORDER — MECLIZINE HYDROCHLORIDE 25 MG/1
25 TABLET ORAL 3 TIMES DAILY PRN
Qty: 60 TABLET | Refills: 0 | Status: SHIPPED | OUTPATIENT
Start: 2025-03-18 | End: 2025-04-17

## 2025-03-18 NOTE — TELEPHONE ENCOUNTER
Pt states she will call when she decides to consult laser ----- Message from Bowen Hall sent at 3/14/2025 11:54 AM CDT -----  Regarding: return call  PATIENT RETURNING CALLPt returned Dawood's call regarding laser consult scheduling. Please call pt at [phone]

## 2025-03-23 ENCOUNTER — PATIENT MESSAGE (OUTPATIENT)
Dept: PRIMARY CARE CLINIC | Facility: CLINIC | Age: 65
End: 2025-03-23
Payer: COMMERCIAL

## 2025-03-26 RX ORDER — ALENDRONATE SODIUM 70 MG/1
70 TABLET ORAL
Qty: 12 TABLET | Refills: 3 | Status: SHIPPED | OUTPATIENT
Start: 2025-03-26 | End: 2026-03-26

## 2025-03-31 ENCOUNTER — PATIENT MESSAGE (OUTPATIENT)
Dept: PRIMARY CARE CLINIC | Facility: CLINIC | Age: 65
End: 2025-03-31
Payer: COMMERCIAL

## 2025-04-01 ENCOUNTER — OFFICE VISIT (OUTPATIENT)
Dept: OTOLARYNGOLOGY | Facility: CLINIC | Age: 65
End: 2025-04-01
Payer: COMMERCIAL

## 2025-04-01 ENCOUNTER — CLINICAL SUPPORT (OUTPATIENT)
Dept: AUDIOLOGY | Facility: CLINIC | Age: 65
End: 2025-04-01
Payer: COMMERCIAL

## 2025-04-01 DIAGNOSIS — H90.3 SENSORINEURAL HEARING LOSS (SNHL) OF BOTH EARS: Primary | ICD-10-CM

## 2025-04-01 DIAGNOSIS — R42 LIGHTHEADED: ICD-10-CM

## 2025-04-01 DIAGNOSIS — Z85.810 HISTORY OF TONGUE CANCER: ICD-10-CM

## 2025-04-01 DIAGNOSIS — H93.8X3 EAR FULLNESS, BILATERAL: Primary | ICD-10-CM

## 2025-04-01 PROCEDURE — 99999 PR PBB SHADOW E&M-EST. PATIENT-LVL III: CPT | Mod: PBBFAC,,,

## 2025-04-01 PROCEDURE — 99999 PR PBB SHADOW E&M-EST. PATIENT-LVL I: CPT | Mod: PBBFAC,,, | Performed by: AUDIOLOGIST

## 2025-04-01 PROCEDURE — 99214 OFFICE O/P EST MOD 30 MIN: CPT | Mod: S$GLB,,,

## 2025-04-01 PROCEDURE — 1159F MED LIST DOCD IN RCRD: CPT | Mod: CPTII,S$GLB,,

## 2025-04-01 PROCEDURE — 1160F RVW MEDS BY RX/DR IN RCRD: CPT | Mod: CPTII,S$GLB,,

## 2025-04-01 PROCEDURE — 92557 COMPREHENSIVE HEARING TEST: CPT | Mod: S$GLB,,, | Performed by: AUDIOLOGIST

## 2025-04-01 PROCEDURE — 92567 TYMPANOMETRY: CPT | Mod: S$GLB,,, | Performed by: AUDIOLOGIST

## 2025-04-01 NOTE — PROGRESS NOTES
Sarahy Ma, a 64 y.o. female, was seen today in the clinic for an audiologic evaluation.  Patient's main complaint was aural fullness and pressure bilaterally. Patient reported her voice sounds really loud to herself and is unable to monitor how loud or soft she is speaking. Patient reported a history of PE tubes. Patient also has a medical history for oral cancer treated with radiation and chemotherapy.    Tympanometry revealed Type As in the right ear and Type B in the left ear.     Audiogram results revealed normal sloping to moderate sensorineural hearing loss in the right ear and normal sloping to mild sensorineural hearing loss in the left ear.  Speech reception thresholds were noted at 20 dB in the right ear and 15 dB in the left ear.  Speech discrimination scores were 100% in the right ear and 100% in the left ear.    Recommendations:  Otologic evaluation  Annual audiogram  Hearing protection when in noise

## 2025-04-01 NOTE — PROGRESS NOTES
Subjective:   Sarahy Ma is a 64 y.o. female with PMHx of tongue cancer s/p radiation+chemo (), UC, and GERD (on PPI) who presents today for ear fullness. She states the fullness sensation started shortly after she finished radiation therapy year ago but have been more bothersome lately. The fullness is in both ears but she feels the right ear may be worse. Symptoms normally start after her morning walks and are pretty persistent throughout the day. She notes associated muffled hearing. She feels as if she is speaking loud but everyone makes comments about her speaking low. Tried Flonase with no improvement in symptoms. She denies otalgia, otorrhea or true room spinning vertigo. Patient mentions episodes of lightheadedness almost daily for years. She takes Meclizine occasionally but it just makes her drowsy. Has not noticed any specific triggers. Balance is good. Exercises regularly. Does not have an excessive amount of caffeine daily. She thinks she could work on hydration. She has a history of ear infections and has several sets of PETs in adulthood after radiation. She does not recall if there was improvement with the tubes. There is not a history of ear trauma. She denies a history of significant noise exposure. Of note, she has had two nasal endoscopies with GABRIELLA Tejeda over the last 2 years which have been normal. No concern for nasopharyngeal masses or obstruction of ET.      Past Medical History  She has a past medical history of DDD (degenerative disc disease), lumbar, Hypertension, Postablative hypothyroidism, Sciatica, Tongue cancer, and Ulcerative colitis.    Past Surgical History  She has a past surgical history that includes  section; Tonsillectomy; feeding tube; Portacath placement; Tubal ligation; Cholecystectomy (2016); Cholecystectomy (2016); groin surgery (2017); Colonoscopy; Hysterectomy; Oophorectomy; Colonoscopy (N/A, 2021); and Transforaminal  epidural injection of steroid (Right, 12/21/2021).    Family History  Her family history includes Arthritis in her father and mother; Cancer in her paternal grandmother; Diabetes in her father; Early death in her brother; Heart attack in her father; Heart disease in her father; Heart failure in her father; Hyperlipidemia in her father; Hypertension in her father and mother; Thyroid disease in her brother, brother, sister, and sister.    Social History  She reports that she quit smoking about 15 years ago. Her smoking use included cigarettes. She started smoking about 44 years ago. She has a 28.7 pack-year smoking history. She has never been exposed to tobacco smoke. She has never used smokeless tobacco. She reports that she does not currently use alcohol. She reports that she does not use drugs.    Allergies  She is allergic to ciprofloxacin, amoxicillin, and nsaids (non-steroidal anti-inflammatory drug).    Medications  She has a current medication list which includes the following prescription(s): fluticasone furoate-vilanterol, fluticasone propionate, gabapentin, meclizine, mesalamine, pantoprazole, synthroid, alendronate, and ondansetron.    Review of Systems     Constitutional: Negative for appetite change, chills, fatigue, fever and unexpected weight loss.      HENT: Positive for hearing loss, mouth sores, postnasal drip, sinus infection, sinus pressure, sore throat, trouble swallowing and voice change.  Negative for ear discharge, ear infection, ear pain and ringing in the ears.      Eyes:  Negative for change in eyesight, eye drainage, eye itching and photophobia.     Respiratory:  Positive for cough.      Cardiovascular:  Negative for chest pain, foot swelling, irregular heartbeat and swollen veins.     Gastrointestinal:  Positive for acid reflux.     Genitourinary: Negative for difficulty urinating, sexual problems and frequent urination.     Musc: Positive for back pain.     Skin: Negative for rash.      Allergy: Negative for food allergies and seasonal allergies.     Endocrine: Positive for cold intolerance.     Neurological: Positive for dizziness, headaches and light-headedness.     Hematologic: Positive for bruises/bleeds easily.     Psychiatric: Negative for decreased concentration, depression, nervous/anxious and sleep disturbance.          Objective:     Constitutional:   She appears well-nourished.     Head:  Normocephalic and atraumatic.     Ears:    Right Ear: No drainage, swelling or tenderness. Tympanic membrane is scarred. Tympanic membrane is not injected, not perforated, not erythematous, not retracted and not bulging. No middle ear effusion.   Left Ear: No drainage, swelling or tenderness. Tympanic membrane is scarred. Tympanic membrane is not injected, not perforated, not erythematous, not retracted and not bulging.  No middle ear effusion.   Normal TM mobility with valsalva.   Significant scarring bilaterally (R>L)      Procedure  None    Audiology     I independently reviewed the tracings of the complete audiometric evaluation performed today. I reviewed the audiogram with the patient as well. Pertinent findings include: normal sloping to moderate SNHL AD and normal sloping to mild SNHL AS. SRT 20 dBHL AD and 15 dBHL AS. Speech discrimination 100% AU. Type B tympanogram AU.    Imaging:   No recent pertinent imaging available.     CT maxillofacial w contrast 4/29/22 reviewed with no concerning findings of enhancing mass within the oral cavity or pharynx.     Assessment:     1. Ear fullness, bilateral    2. History of tongue cancer    3. Lightheaded      Plan:   Sarahy was seen today for referral.  Diagnoses and all orders for this visit:    Ear fullness, bilateral  Reassurance exam and audiogram today. No evidence of effusion, infection or inflammation. Essentially normal audiogram with a mild-moderate high frequency SNHL. Suspect type B tympanograms s/s to excessive scarring. Able to auto  insufflation with valsalva. Symptoms somewhat consistent with patulous eustachian tube. Recommend trial of conservative treatment for a few weeks to see if it effects symptoms: avoid oral decongestants or medicated nasal sprays, decrease caffeine consumption, maintain adequate hydration, daily nasal saline spray. Ok to restart her nasal sprays if she doesn't notice a significant change. Patient was advised to follow-up as needed.     Lightheaded  Symptoms not concerning for inner ear pathology at this time. Recommend follow up with PCP. If symptoms persist we could consider a VNG for further evaluation.    History of tongue cancer

## 2025-04-02 RX ORDER — ALENDRONATE SODIUM 70 MG/1
70 TABLET ORAL
Qty: 12 TABLET | Refills: 3 | Status: SHIPPED | OUTPATIENT
Start: 2025-04-02 | End: 2026-04-02

## 2025-04-06 PROBLEM — R42 DIZZINESS: Status: ACTIVE | Noted: 2025-04-06

## 2025-04-07 ENCOUNTER — TELEPHONE (OUTPATIENT)
Dept: PRIMARY CARE CLINIC | Facility: CLINIC | Age: 65
End: 2025-04-07
Payer: COMMERCIAL

## 2025-04-07 PROBLEM — R03.0 ELEVATED BLOOD PRESSURE READING: Status: ACTIVE | Noted: 2025-04-07

## 2025-04-07 PROBLEM — E87.6 HYPOKALEMIA: Status: ACTIVE | Noted: 2025-04-07

## 2025-04-07 NOTE — TELEPHONE ENCOUNTER
----- Message from [employer] sent at 4/7/2025 10:57 AM CDT -----  Type:  Appointment Request Name of Caller:Evelyn is the first available appointment?No accessSymptoms:hospital f/ould the patient rather a call back or a response via [facility]? Call [facility] Call Back Number:[phone] Additional Information: must be within 7 days of today

## 2025-04-09 ENCOUNTER — PATIENT OUTREACH (OUTPATIENT)
Dept: ADMINISTRATIVE | Facility: CLINIC | Age: 65
End: 2025-04-09
Payer: COMMERCIAL

## 2025-04-09 ENCOUNTER — TELEPHONE (OUTPATIENT)
Dept: PRIMARY CARE CLINIC | Facility: CLINIC | Age: 65
End: 2025-04-09
Payer: COMMERCIAL

## 2025-04-09 NOTE — PROGRESS NOTES
C3 nurse spoke with Sarahy Ma  for a TCC post hospital discharge follow up call. The patient does not have a scheduled HOSFU appointment with Barb Dixon MD  within 5-7 days post hospital discharge date 04/08/2025. Patient stated, this morning she attempted to schedule hospital follow up appointment on portal and unsuccessful.    Message sent to PCP staff requesting they contact patient and schedule follow up appointment.

## 2025-04-10 ENCOUNTER — TELEPHONE (OUTPATIENT)
Dept: PRIMARY CARE CLINIC | Facility: CLINIC | Age: 65
End: 2025-04-10
Payer: COMMERCIAL

## 2025-04-10 RX ORDER — MULTIVIT WITH MINERALS/HERBS
1 TABLET ORAL DAILY
COMMUNITY

## 2025-04-14 ENCOUNTER — PATIENT MESSAGE (OUTPATIENT)
Dept: PRIMARY CARE CLINIC | Facility: CLINIC | Age: 65
End: 2025-04-14
Payer: COMMERCIAL

## 2025-04-14 DIAGNOSIS — I10 ESSENTIAL HYPERTENSION: Primary | ICD-10-CM

## 2025-04-14 NOTE — TELEPHONE ENCOUNTER
LOV with Barb Dixon MD , 11/5/2024(annual)  Pt requesting cardiology referral and CT cardiac scoring test. Orders pended, if appropriate.    Pt d/c from hospital 4/7/25. Scheduled for Virtual Visit for hospital f/u with Dr. Leal.

## 2025-04-27 ENCOUNTER — PATIENT MESSAGE (OUTPATIENT)
Dept: PRIMARY CARE CLINIC | Facility: CLINIC | Age: 65
End: 2025-04-27
Payer: COMMERCIAL

## 2025-05-01 ENCOUNTER — RESULTS FOLLOW-UP (OUTPATIENT)
Dept: PRIMARY CARE CLINIC | Facility: CLINIC | Age: 65
End: 2025-05-01

## 2025-05-01 ENCOUNTER — OFFICE VISIT (OUTPATIENT)
Dept: PRIMARY CARE CLINIC | Facility: CLINIC | Age: 65
End: 2025-05-01
Payer: COMMERCIAL

## 2025-05-01 ENCOUNTER — LAB VISIT (OUTPATIENT)
Dept: LAB | Facility: HOSPITAL | Age: 65
End: 2025-05-01
Attending: NURSE PRACTITIONER
Payer: COMMERCIAL

## 2025-05-01 VITALS
HEART RATE: 96 BPM | DIASTOLIC BLOOD PRESSURE: 74 MMHG | WEIGHT: 112.88 LBS | HEIGHT: 62 IN | BODY MASS INDEX: 20.77 KG/M2 | OXYGEN SATURATION: 97 % | SYSTOLIC BLOOD PRESSURE: 136 MMHG

## 2025-05-01 DIAGNOSIS — E87.6 HYPOKALEMIA: ICD-10-CM

## 2025-05-01 DIAGNOSIS — R00.2 PALPITATIONS: ICD-10-CM

## 2025-05-01 DIAGNOSIS — I10 ESSENTIAL HYPERTENSION: ICD-10-CM

## 2025-05-01 DIAGNOSIS — Z13.6 ENCOUNTER FOR SCREENING FOR CARDIOVASCULAR DISORDERS: ICD-10-CM

## 2025-05-01 DIAGNOSIS — R00.2 PALPITATIONS: Primary | ICD-10-CM

## 2025-05-01 LAB
ANION GAP (OHS): 9 MMOL/L (ref 8–16)
BUN SERPL-MCNC: 16 MG/DL (ref 8–23)
CALCIUM SERPL-MCNC: 9.2 MG/DL (ref 8.7–10.5)
CHLORIDE SERPL-SCNC: 105 MMOL/L (ref 95–110)
CO2 SERPL-SCNC: 25 MMOL/L (ref 23–29)
CREAT SERPL-MCNC: 0.8 MG/DL (ref 0.5–1.4)
GFR SERPLBLD CREATININE-BSD FMLA CKD-EPI: >60 ML/MIN/1.73/M2
GLUCOSE SERPL-MCNC: 97 MG/DL (ref 70–110)
MAGNESIUM SERPL-MCNC: 1.9 MG/DL (ref 1.6–2.6)
POTASSIUM SERPL-SCNC: 3.8 MMOL/L (ref 3.5–5.1)
SODIUM SERPL-SCNC: 139 MMOL/L (ref 136–145)

## 2025-05-01 PROCEDURE — 3078F DIAST BP <80 MM HG: CPT | Mod: CPTII,S$GLB,, | Performed by: NURSE PRACTITIONER

## 2025-05-01 PROCEDURE — 3075F SYST BP GE 130 - 139MM HG: CPT | Mod: CPTII,S$GLB,, | Performed by: NURSE PRACTITIONER

## 2025-05-01 PROCEDURE — 99999 PR PBB SHADOW E&M-EST. PATIENT-LVL IV: CPT | Mod: PBBFAC,,, | Performed by: NURSE PRACTITIONER

## 2025-05-01 PROCEDURE — 1159F MED LIST DOCD IN RCRD: CPT | Mod: CPTII,S$GLB,, | Performed by: NURSE PRACTITIONER

## 2025-05-01 PROCEDURE — 99214 OFFICE O/P EST MOD 30 MIN: CPT | Mod: S$GLB,,, | Performed by: NURSE PRACTITIONER

## 2025-05-01 PROCEDURE — 36415 COLL VENOUS BLD VENIPUNCTURE: CPT

## 2025-05-01 PROCEDURE — 83735 ASSAY OF MAGNESIUM: CPT | Performed by: NURSE PRACTITIONER

## 2025-05-01 PROCEDURE — 3044F HG A1C LEVEL LT 7.0%: CPT | Mod: CPTII,S$GLB,, | Performed by: NURSE PRACTITIONER

## 2025-05-01 PROCEDURE — 3008F BODY MASS INDEX DOCD: CPT | Mod: CPTII,S$GLB,, | Performed by: NURSE PRACTITIONER

## 2025-05-01 PROCEDURE — 80048 BASIC METABOLIC PNL TOTAL CA: CPT

## 2025-05-01 RX ORDER — ALPRAZOLAM 0.25 MG/1
0.25 TABLET ORAL NIGHTLY PRN
Qty: 15 TABLET | Refills: 0 | Status: SHIPPED | OUTPATIENT
Start: 2025-05-01 | End: 2025-05-17

## 2025-05-01 NOTE — PROGRESS NOTES
[facility] Note    Chief Complaint      Chief Complaint   Patient presents with    Hypertension    Hospital Follow Up       History of Present Illness      Sarahy Ma is a 64 y.o. female with chronic conditions of DDD, GERD, HTN, postablative hypothyroidism, sciatica, ulcerative colitis, hx of tongue cancer who presents today for: hospital follow up.     The 10-year ASCVD risk score (Robson DUNLAP, et al., 2019) is: 3.9%    Values used to calculate the score:      Age: 64 years      Sex: Female      Is Non- : No      Diabetic: No      Tobacco smoker: No      Systolic Blood Pressure: 136 mmHg      Is BP treated: No      HDL Cholesterol: 79 mg/dL      Total Cholesterol: 218 mg/dL    Reviewed record below:     Hospital Course:   Sarahy Ma is a pleasant 63 yo lady with a history of hypothyroidism, tongue cancer, UC, lung disease, who presents with sudden onset dizziness when rising from a sitting to a standing position. She checked her BP and it was elevated at SBP ~ 180 mmHg. She was concerned and presented to the ED.   Admitted for evaluation, tele neurology consult obtained. CTA in the ED shows No acute abnormality. No high-grade stenosis or major vessel occlusion. If the patient has an acute, focal neurological deficit, MRI of the brain may be indicated.  MRI is without any abnormalities.      The pt. Sx. Have resolved during the observation period. The pt. Was reassured and discharged home in stable condition. We have discussed plenty hydration and caution when rising from a laying / seated position. Orthostatic hypotension is suspected.     Has been taking BP at home recently and brought logs - average 124/80.   Did have one episode of palpitations, heart racing woke her up out of her sleep, took /80, pt was able to calm herself down with xanax,  HR was 100.     Past Medical History:  Past Medical History:   Diagnosis Date    DDD (degenerative disc  disease), lumbar     GERD (gastroesophageal reflux disease)     Ulcerative Colitis    Hypertension     Postablative hypothyroidism     secondary to the radiation treatment for tongue cancer - treated by oncologist    Radiation 2009    Tongue Cancer treatment    Sciatica     Tongue cancer     Ulcerative colitis     Treated by Dr. Tobias       Past Surgical History:   has a past surgical history that includes  section; Tonsillectomy; feeding tube; Portacath placement; Tubal ligation; Cholecystectomy (2016); Cholecystectomy (2016); groin surgery (2017); Colonoscopy; Hysterectomy; Oophorectomy; Colonoscopy (N/A, 2021); Transforaminal epidural injection of steroid (Right, 2021); Tympanostomy tube placement; Bladder suspension; and Colporrhaphy.    Family History:  family history includes Arthritis in her father and mother; Cancer in her paternal grandmother; Diabetes in her father; Early death in her brother; Heart attack in her father; Heart disease in her father; Heart failure in her father; Hyperlipidemia in her father; Hypertension in her father and mother; Thyroid disease in her brother, brother, sister, and sister.     Social History:  Social History[1]    Review of Systems   Constitutional:  Negative for chills and fever.   Respiratory:  Negative for cough and shortness of breath.    Cardiovascular:  Positive for palpitations. Negative for chest pain.   Gastrointestinal:  Negative for constipation, diarrhea, nausea and vomiting.   Genitourinary:  Negative for dysuria and hematuria.   Musculoskeletal:  Negative for falls.   Neurological:  Negative for headaches.        Medications:  Encounter Medications[2]    Allergies:  Review of patient's allergies indicates:   Allergen Reactions    Ciprofloxacin Rash     Felt like ants and fire ants crawling on me after taking medication for 2 days    Amoxicillin      Other reaction(s): fever blisters  Tolerates cefdinir    Nsaids  "(non-steroidal anti-inflammatory drug)      Other reaction(s): nausea, stomach issues       Health Maintenance:  Immunization History   Administered Date(s) Administered    COVID-19, MRNA, LN-S, PF (MODERNA FULL 0.5 ML DOSE) 04/22/2021, 05/20/2021    Influenza 09/20/2023    Influenza - Quadrivalent - PF *Preferred* (6 months and older) 09/24/2019, 09/17/2020, 10/28/2021, 11/07/2022    Influenza - Trivalent - Fluarix, Flulaval, Fluzone, Afluria - PF 11/05/2024    Tdap 06/02/2017      Health Maintenance   Topic Date Due    Pneumococcal Vaccines (Age 50+) (1 of 2 - PCV) Never done    Shingles Vaccine (1 of 2) Never done    RSV Vaccine (Age 60+ and Pregnant patients) (1 - Risk 60-74 years 1-dose series) Never done    COVID-19 Vaccine (3 - 2024-25 season) 09/01/2024    Mammogram  06/13/2025    HIV Screening  05/26/2029 (Originally 5/15/1975)    Colorectal Cancer Screening  02/19/2026    TETANUS VACCINE  06/02/2027    Lipid Panel  04/06/2030    Hepatitis C Screening  Completed    Influenza Vaccine  Completed        Physical Exam      Vital Signs  Pulse: 96  SpO2: 97 %  BP: 136/74  BP Location: Left arm  Patient Position: Sitting  Height and Weight  Height: 5' 2" (157.5 cm)  Weight: 51.2 kg (112 lb 14 oz)  BSA (Calculated - sq m): 1.5 sq meters  BMI (Calculated): 20.6  Weight in (lb) to have BMI = 25: 136.4]    Physical Exam  Constitutional:       Appearance: She is well-developed.   HENT:      Head: Normocephalic and atraumatic.   Cardiovascular:      Rate and Rhythm: Normal rate and regular rhythm.      Heart sounds: Normal heart sounds. No murmur heard.  Pulmonary:      Effort: Pulmonary effort is normal. No respiratory distress.      Breath sounds: Normal breath sounds.   Skin:     General: Skin is warm and dry.   Neurological:      Mental Status: She is alert. Mental status is at baseline.   Psychiatric:         Behavior: Behavior normal.          Laboratory:  CBC:  Recent Labs   Lab 10/29/24  1022 04/06/25  2253 " 04/06/25 2253 04/07/25 0544   WBC 4.12 5.61   < > 7.29   RBC 4.40 4.42   < > 4.40   Hemoglobin 13.3  --   --   --    HGB  --  13.6   < > 13.6   Hematocrit 39.2  --   --   --    HCT  --  39.4   < > 38.8   Platelet Count  --  164   < > 172   Platelets 170  --   --   --    MCV 89 89   < > 88   MCH 30.2 30.8   < > 30.9   MCHC 33.9 34.5  --  35.1    < > = values in this interval not displayed.     CMP:  Recent Labs   Lab 11/27/23  0758 10/29/24  1022 04/06/25  2253 04/07/25  0544 05/01/25  1527   Glucose 89 85 116 H   < > 97   Calcium 9.5 9.2 9.6   < > 9.2   Albumin 4.0 3.7 4.2  --   --    Protein Total  --   --  7.6  --   --    Total Protein 7.0 6.5  --   --   --    Sodium 139 141 139   < > 139   Potassium 4.0 3.9 3.3 L   < > 3.8   CO2 27 25 22 L   < > 25   Chloride 102 107 106   < > 105   BUN 15 12 15   < > 16   Alkaline Phosphatase 84 79  --   --   --    ALP  --   --  100  --   --    ALT 16 11 15  --   --    AST 21 14 19  --   --    Total Bilirubin 0.9 0.7  --   --   --    Bilirubin Total  --   --  0.4  --   --     < > = values in this interval not displayed.     URINALYSIS:  Recent Labs   Lab 06/07/23  1719 09/25/23  1141 06/20/24  0736   Color, UA Yellow  --  Straw   Specific [place], UA <=1.005 A  --  1.010   pH, UA 6.0   < > 7.0   Protein, UA Negative  --  Negative   Bacteria Rare  --  Rare   Nitrite, UA Negative  --  Negative   Leukocytes, UA Negative  --  Negative   Urobilinogen, UA Negative  --   --     < > = values in this interval not displayed.      LIPIDS:  Recent Labs   Lab 11/27/23  0758 10/29/24  1022 10/29/24  1023 04/06/25  2253   TSH 1.710  --  2.653 3.287   HDL 66 70  --   --    HDL Cholesterol  --   --   --  79 H   Cholesterol Total  --   --   --  218 H   Cholesterol 192 200 H  --   --    Triglycerides 79 89  --   --    Triglyceride  --   --   --  139   LDL Cholesterol 110.2 112.2  --  111.2   HDL/Cholesterol Ratio 34.4 35.0  --  36.2   Non-HDL Cholesterol 126 130  --   --    Non HDL Cholesterol   --   --   --  139   Total Cholesterol/HDL Ratio 2.9 2.9  --   --    Cholesterol/HDL Ratio  --   --   --  2.8     TSH:  Recent Labs   Lab 11/27/23  0758 10/29/24  1023 04/06/25  2253   TSH 1.710 2.653 3.287     A1C:  Recent Labs   Lab 11/27/23  0758 04/07/25  0544   Hemoglobin A1C 5.2  --    Hemoglobin A1c  --  5.3       Assessment/Plan     Sarahy Ma is a 64 y.o.female with:    1. Palpitations  - Holter monitor - 24 hour; Future  - Magnesium; Future  - Magnesium  - will consider cardiology referral after    2. Encounter for screening for cardiovascular disorders  - CT Cardiac Scoring; Future  - CT Cardiac Scoring    3. Essential hypertension  Bp at goal. Not currently on medication     4. Hypokalemia  - Basic Metabolic Panel; Future  - will check levels post hospital.   \  send in Xanax to [facility].     Chronic conditions status updated as per HPI.  Other than changes above, cont current medications and maintain follow up with specialists.  No follow-ups on file.    Future Appointments   Date Time Provider Department Center   5/7/2025  9:45 AM CV [facility] 24HR HOLTER, [address] [facility] CARD [facility]   6/16/2025  9:00 AM DESH OIC MAMMO1 DES MAMMO Destre Adrienne Cotaya, FNP [facility]                       [1]   Social History  Tobacco Use    Smoking status: Former     Current packs/day: 0.00     Average packs/day: 1 pack/day for 28.7 years (28.7 ttl pk-yrs)     Types: Cigarettes     Start date: 8/18/1980     Quit date: 5/15/2009     Years since quitting: 15.9     Passive exposure: Never    Smokeless tobacco: Never   Substance Use Topics    Alcohol use: Not Currently     Comment: occasionally    Drug use: Never   [2]   Outpatient Encounter Medications as of 5/1/2025   Medication Sig Note Dispense Refill    b complex vitamins tablet Take 1 tablet by mouth once daily.       calcium carbonate/vitamin D3 (CALCIUM WITH VITAMIN D ORAL) Take by mouth.       fluticasone furoate-vilanteroL (BREO  ELLIPTA) 100-25 mcg/dose diskus inhaler Inhale 1 puff into the lungs once daily. Controller  180 each 2    fluticasone propionate (FLONASE) 50 mcg/actuation nasal spray SPRAY 2 SPRAYS INTO EACH NOSTRIL ONCE DAILY  48 mL 3    gabapentin (NEURONTIN) 300 MG capsule TAKE 1 CAPSULE BY MOUTH THREE TIMES A DAY (Patient taking differently: Take 300 mg by mouth 2 (two) times daily.)  90 capsule 11    mesalamine (APRISO) 0.375 gram Cp24 Take 4 capsules (1.5 g total) by mouth once daily. Can taper to 2 capsules per day if symptoms controlled (Patient taking differently: Take 1.5 g by mouth once daily. 2 capsules per day)  120 capsule 11    pantoprazole (PROTONIX) 40 MG tablet TAKE 1 TABLET BY MOUTH EVERY DAY  90 tablet 3    SYNTHROID 75 mcg tablet TAKE 1 TABLET BY MOUTH BEFORE BREAKFAST.  90 tablet 3    ALPRAZolam (XANAX) 0.25 MG tablet Take 1 tablet (0.25 mg total) by mouth nightly as needed for Anxiety.  15 tablet 0    meclizine (ANTIVERT) 25 mg tablet Take 1 tablet (25 mg total) by mouth 3 (three) times daily as needed for Dizziness.  60 tablet 0    [DISCONTINUED] alendronate (FOSAMAX) 70 MG tablet Take 1 tablet (70 mg total) by mouth every 7 days. (Patient not taking: Reported on 4/9/2025) 4/9/2025: Will discuss with PCP before starting 12 tablet 3    [DISCONTINUED] ondansetron (ZOFRAN-ODT) 4 MG TbDL Take 1 tablet (4 mg total) by mouth every 8 (eight) hours as needed (nausea).  10 tablet 0     No facility-administered encounter medications on file as of 5/1/2025.

## 2025-05-05 ENCOUNTER — PATIENT MESSAGE (OUTPATIENT)
Dept: PRIMARY CARE CLINIC | Facility: CLINIC | Age: 65
End: 2025-05-05
Payer: COMMERCIAL

## 2025-05-05 NOTE — TELEPHONE ENCOUNTER
LOV with Nancy Contreras NP , 5/1/2025  Holter monitor appt scheduled, but I do not see an active request for CT scoring. (It is listed in your note.) Previous order pending authorization.

## 2025-05-20 ENCOUNTER — HOSPITAL ENCOUNTER (OUTPATIENT)
Dept: CARDIOLOGY | Facility: HOSPITAL | Age: 65
Discharge: HOME | End: 2025-05-20
Attending: NURSE PRACTITIONER
Payer: COMMERCIAL

## 2025-05-20 DIAGNOSIS — R00.2 PALPITATIONS: ICD-10-CM

## 2025-05-20 PROCEDURE — 93225 XTRNL ECG REC<48 HRS REC: CPT | Mod: PN

## 2025-05-20 PROCEDURE — 93227 XTRNL ECG REC<48 HR R&I: CPT | Mod: ,,, | Performed by: INTERNAL MEDICINE

## 2025-05-22 LAB
OHS CV EVENT MONITOR DAY: 0
OHS CV HOLTER LENGTH DECIMAL HOURS: 47.98
OHS CV HOLTER LENGTH HOURS: 47
OHS CV HOLTER LENGTH MINUTES: 59
OHS CV HOLTER SINUS AVERAGE HR: 81
OHS CV HOLTER SINUS MAX HR: 132
OHS CV HOLTER SINUS MIN HR: 55

## 2025-06-02 ENCOUNTER — PATIENT MESSAGE (OUTPATIENT)
Dept: PRIMARY CARE CLINIC | Facility: CLINIC | Age: 65
End: 2025-06-02
Payer: COMMERCIAL

## 2025-06-03 NOTE — TELEPHONE ENCOUNTER
" LOV with Nancy Diane, 5/1/2025  Recent note states"Bp at goal. Not currently on medication "  Pt has attached more Bp readings. She does have an appt with you on tomorrow to further discuss her concerns of elevated bp. NOTE: pt mentions taking 1/2 Amlodipine twice. Pt was prev on this medication but was d/c by you on 11/4/24 for hypotension  "

## 2025-06-04 ENCOUNTER — OFFICE VISIT (OUTPATIENT)
Dept: PRIMARY CARE CLINIC | Facility: CLINIC | Age: 65
End: 2025-06-04
Payer: COMMERCIAL

## 2025-06-04 ENCOUNTER — PATIENT MESSAGE (OUTPATIENT)
Dept: PRIMARY CARE CLINIC | Facility: CLINIC | Age: 65
End: 2025-06-04

## 2025-06-04 DIAGNOSIS — I10 ESSENTIAL HYPERTENSION: ICD-10-CM

## 2025-06-04 DIAGNOSIS — R00.2 PALPITATIONS: Primary | ICD-10-CM

## 2025-06-04 PROBLEM — R03.0 ELEVATED BLOOD PRESSURE READING: Status: RESOLVED | Noted: 2025-04-07 | Resolved: 2025-06-04

## 2025-06-04 PROCEDURE — 1159F MED LIST DOCD IN RCRD: CPT | Mod: CPTII,95,, | Performed by: INTERNAL MEDICINE

## 2025-06-04 PROCEDURE — 3044F HG A1C LEVEL LT 7.0%: CPT | Mod: CPTII,95,, | Performed by: INTERNAL MEDICINE

## 2025-06-04 PROCEDURE — 98005 SYNCH AUDIO-VIDEO EST LOW 20: CPT | Mod: 95,,, | Performed by: INTERNAL MEDICINE

## 2025-06-04 PROCEDURE — 1160F RVW MEDS BY RX/DR IN RCRD: CPT | Mod: CPTII,95,, | Performed by: INTERNAL MEDICINE

## 2025-06-04 RX ORDER — AMLODIPINE BESYLATE 5 MG/1
5 TABLET ORAL DAILY
Qty: 90 TABLET | Refills: 3 | Status: SHIPPED | OUTPATIENT
Start: 2025-06-04 | End: 2026-06-04

## 2025-06-06 ENCOUNTER — PATIENT MESSAGE (OUTPATIENT)
Dept: PRIMARY CARE CLINIC | Facility: CLINIC | Age: 65
End: 2025-06-06
Payer: COMMERCIAL

## 2025-06-15 ENCOUNTER — PATIENT MESSAGE (OUTPATIENT)
Dept: PRIMARY CARE CLINIC | Facility: CLINIC | Age: 65
End: 2025-06-15
Payer: COMMERCIAL

## 2025-06-16 ENCOUNTER — RESULTS FOLLOW-UP (OUTPATIENT)
Dept: PRIMARY CARE CLINIC | Facility: CLINIC | Age: 65
End: 2025-06-16

## 2025-06-16 NOTE — TELEPHONE ENCOUNTER
LOV with Barb Dixon MD , 6/4/2025    Pt sent in BP log, states her BP was doing good but increased on Friday. Pt states she would like to increase the Amlodipine. Reviewed last PCP clinic note which reads:   -resume amlodipine 5 mg daily, will send me BP readings on 6/6, may need to go up to 10 mg dose pending results     Pt asking if she should Amlodipine 5mg BID or Amlodipine 10mg QD?

## 2025-06-20 ENCOUNTER — PATIENT MESSAGE (OUTPATIENT)
Dept: PRIMARY CARE CLINIC | Facility: CLINIC | Age: 65
End: 2025-06-20
Payer: COMMERCIAL

## 2025-07-07 DIAGNOSIS — R00.2 PALPITATIONS: ICD-10-CM

## 2025-07-08 RX ORDER — ALPRAZOLAM 0.25 MG/1
0.25 TABLET ORAL NIGHTLY PRN
Qty: 30 TABLET | Refills: 5 | Status: SHIPPED | OUTPATIENT
Start: 2025-07-08 | End: 2025-08-07

## 2025-07-20 ENCOUNTER — PATIENT MESSAGE (OUTPATIENT)
Dept: PRIMARY CARE CLINIC | Facility: CLINIC | Age: 65
End: 2025-07-20
Payer: COMMERCIAL

## 2025-07-20 DIAGNOSIS — I10 ESSENTIAL HYPERTENSION: ICD-10-CM

## 2025-07-21 NOTE — TELEPHONE ENCOUNTER
LOV-6/4/25    New rx for  amLODIPine (NORVASC) 10 MG table once a day.  Pt states she was taking Amlodipine 5mg bid    Medication pended per provider approval

## 2025-07-22 VITALS — SYSTOLIC BLOOD PRESSURE: 125 MMHG | DIASTOLIC BLOOD PRESSURE: 84 MMHG

## 2025-07-23 ENCOUNTER — PATIENT MESSAGE (OUTPATIENT)
Dept: PRIMARY CARE CLINIC | Facility: CLINIC | Age: 65
End: 2025-07-23
Payer: COMMERCIAL

## 2025-07-23 RX ORDER — AMLODIPINE BESYLATE 10 MG/1
10 TABLET ORAL DAILY
Qty: 90 TABLET | Refills: 3 | Status: SHIPPED | OUTPATIENT
Start: 2025-07-23 | End: 2026-07-23

## 2025-08-25 ENCOUNTER — PATIENT MESSAGE (OUTPATIENT)
Dept: PRIMARY CARE CLINIC | Facility: CLINIC | Age: 65
End: 2025-08-25
Payer: COMMERCIAL

## (undated) DEVICE — DRESSING LEUKOPLAST FLEX 1X3IN